# Patient Record
Sex: FEMALE | Race: WHITE | NOT HISPANIC OR LATINO | Employment: OTHER | URBAN - METROPOLITAN AREA
[De-identification: names, ages, dates, MRNs, and addresses within clinical notes are randomized per-mention and may not be internally consistent; named-entity substitution may affect disease eponyms.]

---

## 2024-02-09 ENCOUNTER — HOSPITAL ENCOUNTER (INPATIENT)
Facility: REHABILITATION | Age: 79
LOS: 14 days | DRG: 056 | End: 2024-02-23
Attending: PHYSICAL MEDICINE & REHABILITATION | Admitting: PHYSICAL MEDICINE & REHABILITATION
Payer: MEDICARE

## 2024-02-09 DIAGNOSIS — D68.51 FACTOR V LEIDEN (HCC): ICD-10-CM

## 2024-02-09 DIAGNOSIS — I63.512 ACUTE ISCHEMIC LEFT MCA STROKE (HCC): ICD-10-CM

## 2024-02-09 DIAGNOSIS — R41.82 ALTERED MENTAL STATUS, UNSPECIFIED ALTERED MENTAL STATUS TYPE: ICD-10-CM

## 2024-02-09 DIAGNOSIS — I10 PRIMARY HYPERTENSION: ICD-10-CM

## 2024-02-09 DIAGNOSIS — I25.10 CORONARY ARTERY DISEASE, UNSPECIFIED VESSEL OR LESION TYPE, UNSPECIFIED WHETHER ANGINA PRESENT, UNSPECIFIED WHETHER NATIVE OR TRANSPLANTED HEART: ICD-10-CM

## 2024-02-09 PROCEDURE — 770005 HCHG ROOM/CARE - REHAB PRIVATE (11*

## 2024-02-09 PROCEDURE — 700111 HCHG RX REV CODE 636 W/ 250 OVERRIDE (IP): Mod: JZ | Performed by: PHYSICAL MEDICINE & REHABILITATION

## 2024-02-09 PROCEDURE — 700102 HCHG RX REV CODE 250 W/ 637 OVERRIDE(OP): Performed by: PHYSICAL MEDICINE & REHABILITATION

## 2024-02-09 PROCEDURE — A9270 NON-COVERED ITEM OR SERVICE: HCPCS | Performed by: PHYSICAL MEDICINE & REHABILITATION

## 2024-02-09 PROCEDURE — 99223 1ST HOSP IP/OBS HIGH 75: CPT | Performed by: PHYSICAL MEDICINE & REHABILITATION

## 2024-02-09 PROCEDURE — 94760 N-INVAS EAR/PLS OXIMETRY 1: CPT

## 2024-02-09 RX ORDER — MECLIZINE HYDROCHLORIDE 25 MG/1
12.5 TABLET ORAL 2 TIMES DAILY
Status: DISCONTINUED | OUTPATIENT
Start: 2024-02-09 | End: 2024-02-14

## 2024-02-09 RX ORDER — LOSARTAN POTASSIUM 50 MG/1
100 TABLET ORAL
Status: DISCONTINUED | OUTPATIENT
Start: 2024-02-10 | End: 2024-02-14

## 2024-02-09 RX ORDER — CYCLOBENZAPRINE HCL 10 MG
10 TABLET ORAL 2 TIMES DAILY
Status: DISCONTINUED | OUTPATIENT
Start: 2024-02-09 | End: 2024-02-13

## 2024-02-09 RX ORDER — ATORVASTATIN CALCIUM 40 MG/1
40 TABLET, FILM COATED ORAL EVERY EVENING
Status: DISCONTINUED | OUTPATIENT
Start: 2024-02-09 | End: 2024-02-23 | Stop reason: HOSPADM

## 2024-02-09 RX ORDER — HYDROXYZINE HYDROCHLORIDE 25 MG/1
25 TABLET, FILM COATED ORAL 3 TIMES DAILY PRN
Status: DISCONTINUED | OUTPATIENT
Start: 2024-02-09 | End: 2024-02-14

## 2024-02-09 RX ORDER — ONDANSETRON 4 MG/1
4 TABLET, ORALLY DISINTEGRATING ORAL 4 TIMES DAILY PRN
Status: DISCONTINUED | OUTPATIENT
Start: 2024-02-09 | End: 2024-02-23 | Stop reason: HOSPADM

## 2024-02-09 RX ORDER — ONDANSETRON 2 MG/ML
4 INJECTION INTRAMUSCULAR; INTRAVENOUS 4 TIMES DAILY PRN
Status: DISCONTINUED | OUTPATIENT
Start: 2024-02-09 | End: 2024-02-23 | Stop reason: HOSPADM

## 2024-02-09 RX ORDER — AMOXICILLIN 250 MG
2 CAPSULE ORAL 2 TIMES DAILY
Status: DISCONTINUED | OUTPATIENT
Start: 2024-02-09 | End: 2024-02-22

## 2024-02-09 RX ORDER — TRAZODONE HYDROCHLORIDE 50 MG/1
50 TABLET ORAL
Status: DISCONTINUED | OUTPATIENT
Start: 2024-02-09 | End: 2024-02-14

## 2024-02-09 RX ORDER — POLYETHYLENE GLYCOL 3350 17 G/17G
1 POWDER, FOR SOLUTION ORAL
Status: DISCONTINUED | OUTPATIENT
Start: 2024-02-09 | End: 2024-02-22

## 2024-02-09 RX ORDER — PAROXETINE HYDROCHLORIDE 20 MG/1
40 TABLET, FILM COATED ORAL DAILY
Status: DISCONTINUED | OUTPATIENT
Start: 2024-02-10 | End: 2024-02-14

## 2024-02-09 RX ORDER — AMLODIPINE BESYLATE 5 MG/1
10 TABLET ORAL
Status: DISCONTINUED | OUTPATIENT
Start: 2024-02-10 | End: 2024-02-23 | Stop reason: HOSPADM

## 2024-02-09 RX ORDER — MECLIZINE HYDROCHLORIDE 25 MG/1
12.5 TABLET ORAL 3 TIMES DAILY PRN
Status: DISCONTINUED | OUTPATIENT
Start: 2024-02-09 | End: 2024-02-14

## 2024-02-09 RX ORDER — ASPIRIN 81 MG/1
81 TABLET, CHEWABLE ORAL DAILY
Status: DISCONTINUED | OUTPATIENT
Start: 2024-02-10 | End: 2024-02-23 | Stop reason: HOSPADM

## 2024-02-09 RX ORDER — ENOXAPARIN SODIUM 100 MG/ML
40 INJECTION SUBCUTANEOUS DAILY
Status: DISCONTINUED | OUTPATIENT
Start: 2024-02-09 | End: 2024-02-23 | Stop reason: HOSPADM

## 2024-02-09 RX ORDER — HYDRALAZINE HYDROCHLORIDE 25 MG/1
25 TABLET, FILM COATED ORAL EVERY 8 HOURS PRN
Status: DISCONTINUED | OUTPATIENT
Start: 2024-02-09 | End: 2024-02-23 | Stop reason: HOSPADM

## 2024-02-09 RX ORDER — GABAPENTIN 100 MG/1
100 CAPSULE ORAL 2 TIMES DAILY
Status: DISCONTINUED | OUTPATIENT
Start: 2024-02-09 | End: 2024-02-14

## 2024-02-09 RX ORDER — MONTELUKAST SODIUM 10 MG/1
10 TABLET ORAL NIGHTLY
Status: DISCONTINUED | OUTPATIENT
Start: 2024-02-09 | End: 2024-02-22

## 2024-02-09 RX ORDER — ECHINACEA PURPUREA EXTRACT 125 MG
2 TABLET ORAL PRN
Status: DISCONTINUED | OUTPATIENT
Start: 2024-02-09 | End: 2024-02-23 | Stop reason: HOSPADM

## 2024-02-09 RX ORDER — OMEPRAZOLE 20 MG/1
20 CAPSULE, DELAYED RELEASE ORAL DAILY
Status: DISCONTINUED | OUTPATIENT
Start: 2024-02-10 | End: 2024-02-21

## 2024-02-09 RX ORDER — CLOPIDOGREL BISULFATE 75 MG/1
75 TABLET ORAL DAILY
Status: DISCONTINUED | OUTPATIENT
Start: 2024-02-10 | End: 2024-02-23 | Stop reason: HOSPADM

## 2024-02-09 RX ADMIN — ENOXAPARIN SODIUM 40 MG: 100 INJECTION SUBCUTANEOUS at 18:31

## 2024-02-09 RX ADMIN — ATORVASTATIN CALCIUM 40 MG: 40 TABLET, FILM COATED ORAL at 21:11

## 2024-02-09 RX ADMIN — SENNOSIDES AND DOCUSATE SODIUM 2 TABLET: 50; 8.6 TABLET ORAL at 21:11

## 2024-02-09 RX ADMIN — GABAPENTIN 100 MG: 100 CAPSULE ORAL at 21:11

## 2024-02-09 RX ADMIN — CYCLOBENZAPRINE 10 MG: 10 TABLET, FILM COATED ORAL at 21:11

## 2024-02-09 RX ADMIN — MECLIZINE HYDROCHLORIDE 12.5 MG: 25 TABLET ORAL at 21:11

## 2024-02-09 RX ADMIN — METOPROLOL TARTRATE 100 MG: 25 TABLET, FILM COATED ORAL at 18:31

## 2024-02-09 RX ADMIN — MONTELUKAST 10 MG: 10 TABLET, FILM COATED ORAL at 21:10

## 2024-02-09 ASSESSMENT — LIFESTYLE VARIABLES
TOTAL SCORE: 0
ALCOHOL_USE: YES
ALCOHOL_USE: NO
HOW MANY TIMES IN THE PAST YEAR HAVE YOU HAD 5 OR MORE DRINKS IN A DAY: 0
ON A TYPICAL DAY WHEN YOU DRINK ALCOHOL HOW MANY DRINKS DO YOU HAVE: 1
CONSUMPTION TOTAL: NEGATIVE
HOW MANY TIMES IN THE PAST YEAR HAVE YOU HAD 5 OR MORE DRINKS IN A DAY: 0
HAVE PEOPLE ANNOYED YOU BY CRITICIZING YOUR DRINKING: NO
ON A TYPICAL DAY WHEN YOU DRINK ALCOHOL HOW MANY DRINKS DO YOU HAVE: 0
AVERAGE NUMBER OF DAYS PER WEEK YOU HAVE A DRINK CONTAINING ALCOHOL: 1
HAVE YOU EVER FELT YOU SHOULD CUT DOWN ON YOUR DRINKING: NO
EVER HAD A DRINK FIRST THING IN THE MORNING TO STEADY YOUR NERVES TO GET RID OF A HANGOVER: NO
EVER HAD A DRINK FIRST THING IN THE MORNING TO STEADY YOUR NERVES TO GET RID OF A HANGOVER: NO
TOTAL SCORE: 0
AVERAGE NUMBER OF DAYS PER WEEK YOU HAVE A DRINK CONTAINING ALCOHOL: 0
EVER_SMOKED: YES
TOTAL SCORE: 0
TOTAL SCORE: 0
HAVE PEOPLE ANNOYED YOU BY CRITICIZING YOUR DRINKING: NO
CONSUMPTION TOTAL: NEGATIVE
HAVE YOU EVER FELT YOU SHOULD CUT DOWN ON YOUR DRINKING: NO
EVER FELT BAD OR GUILTY ABOUT YOUR DRINKING: NO
TOTAL SCORE: 0
EVER FELT BAD OR GUILTY ABOUT YOUR DRINKING: NO
TOTAL SCORE: 0

## 2024-02-09 ASSESSMENT — PATIENT HEALTH QUESTIONNAIRE - PHQ9
SUM OF ALL RESPONSES TO PHQ9 QUESTIONS 1 AND 2: 0
2. FEELING DOWN, DEPRESSED, IRRITABLE, OR HOPELESS: NOT AT ALL
1. LITTLE INTEREST OR PLEASURE IN DOING THINGS: NOT AT ALL
1. LITTLE INTEREST OR PLEASURE IN DOING THINGS: NOT AT ALL
SUM OF ALL RESPONSES TO PHQ9 QUESTIONS 1 AND 2: 0
2. FEELING DOWN, DEPRESSED, IRRITABLE, OR HOPELESS: NOT AT ALL

## 2024-02-09 ASSESSMENT — PAIN DESCRIPTION - PAIN TYPE: TYPE: ACUTE PAIN

## 2024-02-09 NOTE — PROGRESS NOTES
1506 Pt arrived at [facility] from [facility] via transport. Dr. Powers to follow. Initial assessment initiated. Pt oriented to room and facility routine and safety measures; pt education binder provided and discussed. Pt A/O x 4, incontinent/continent of bowel and bladder. Min assist for transfers. All wounds photographed and documented; photos uploaded to Media Manager. Pt denies pain or discomfort at this time. Pt positioned for comfort in bed. Call light within reach, safety measures in place. Will continue to monitor.

## 2024-02-09 NOTE — PROGRESS NOTES
4 Eyes Skin Assessment Completed by LAYO Avery and LAYO Givens.    Head WDL  Ears WDL  Nose WDL  Mouth WDL  Neck WDL  Breast/Chest WDL  Shoulder Blades WDL  Spine WDL  (R) Arm/Elbow/Hand WDL  (L) Arm/Elbow/Hand WDL  Abdomen WDL  Groin WDL  Scrotum/Coccyx/Buttocks Redness  (R) Leg WDL  (L) Leg WDL  (R) Heel/Foot/Toe Redness  (L) Heel/Foot/Toe Redness          Devices In Places Nasal Canula      Interventions In Place Waffle Overlay    Possible Skin Injury No    Pictures Uploaded Into Epic Yes  Wound Consult Placed Yes  RN Wound Prevention Protocol Ordered Yes

## 2024-02-10 ENCOUNTER — APPOINTMENT (OUTPATIENT)
Dept: SPEECH THERAPY | Facility: REHABILITATION | Age: 79
DRG: 056 | End: 2024-02-10
Attending: PHYSICAL MEDICINE & REHABILITATION
Payer: MEDICARE

## 2024-02-10 ENCOUNTER — APPOINTMENT (OUTPATIENT)
Dept: OCCUPATIONAL THERAPY | Facility: REHABILITATION | Age: 79
DRG: 056 | End: 2024-02-10
Attending: PHYSICAL MEDICINE & REHABILITATION
Payer: MEDICARE

## 2024-02-10 ENCOUNTER — APPOINTMENT (OUTPATIENT)
Dept: PHYSICAL THERAPY | Facility: REHABILITATION | Age: 79
DRG: 056 | End: 2024-02-10
Attending: PHYSICAL MEDICINE & REHABILITATION
Payer: MEDICARE

## 2024-02-10 ENCOUNTER — ANCILLARY PROCEDURE (OUTPATIENT)
Dept: CARDIOLOGY | Facility: REHABILITATION | Age: 79
DRG: 056 | End: 2024-02-10
Attending: PHYSICAL MEDICINE & REHABILITATION
Payer: MEDICARE

## 2024-02-10 LAB
ALBUMIN SERPL BCP-MCNC: 3.1 G/DL (ref 3.2–4.9)
ALBUMIN/GLOB SERPL: 1 G/DL
ALP SERPL-CCNC: 144 U/L (ref 30–99)
ALT SERPL-CCNC: 11 U/L (ref 2–50)
ANION GAP SERPL CALC-SCNC: 9 MMOL/L (ref 7–16)
AST SERPL-CCNC: 10 U/L (ref 12–45)
BASOPHILS # BLD AUTO: 0.7 % (ref 0–1.8)
BASOPHILS # BLD: 0.05 K/UL (ref 0–0.12)
BILIRUB SERPL-MCNC: 0.2 MG/DL (ref 0.1–1.5)
BUN SERPL-MCNC: 8 MG/DL (ref 8–22)
CALCIUM ALBUM COR SERPL-MCNC: 8.8 MG/DL (ref 8.5–10.5)
CALCIUM SERPL-MCNC: 8.1 MG/DL (ref 8.5–10.5)
CHLORIDE SERPL-SCNC: 106 MMOL/L (ref 96–112)
CO2 SERPL-SCNC: 26 MMOL/L (ref 20–33)
CREAT SERPL-MCNC: 0.51 MG/DL (ref 0.5–1.4)
EOSINOPHIL # BLD AUTO: 0.42 K/UL (ref 0–0.51)
EOSINOPHIL NFR BLD: 6.1 % (ref 0–6.9)
ERYTHROCYTE [DISTWIDTH] IN BLOOD BY AUTOMATED COUNT: 41.9 FL (ref 35.9–50)
EST. AVERAGE GLUCOSE BLD GHB EST-MCNC: 120 MG/DL
GFR SERPLBLD CREATININE-BSD FMLA CKD-EPI: 96 ML/MIN/1.73 M 2
GLOBULIN SER CALC-MCNC: 3 G/DL (ref 1.9–3.5)
GLUCOSE SERPL-MCNC: 97 MG/DL (ref 65–99)
HBA1C MFR BLD: 5.8 % (ref 4–5.6)
HCT VFR BLD AUTO: 33 % (ref 37–47)
HGB BLD-MCNC: 10.7 G/DL (ref 12–16)
IMM GRANULOCYTES # BLD AUTO: 0.02 K/UL (ref 0–0.11)
IMM GRANULOCYTES NFR BLD AUTO: 0.3 % (ref 0–0.9)
LYMPHOCYTES # BLD AUTO: 1.57 K/UL (ref 1–4.8)
LYMPHOCYTES NFR BLD: 23 % (ref 22–41)
MAGNESIUM SERPL-MCNC: 2 MG/DL (ref 1.5–2.5)
MCH RBC QN AUTO: 30.9 PG (ref 27–33)
MCHC RBC AUTO-ENTMCNC: 32.4 G/DL (ref 32.2–35.5)
MCV RBC AUTO: 95.4 FL (ref 81.4–97.8)
MONOCYTES # BLD AUTO: 0.38 K/UL (ref 0–0.85)
MONOCYTES NFR BLD AUTO: 5.6 % (ref 0–13.4)
NEUTROPHILS # BLD AUTO: 4.39 K/UL (ref 1.82–7.42)
NEUTROPHILS NFR BLD: 64.3 % (ref 44–72)
NRBC # BLD AUTO: 0 K/UL
NRBC BLD-RTO: 0 /100 WBC (ref 0–0.2)
PLATELET # BLD AUTO: 303 K/UL (ref 164–446)
PMV BLD AUTO: 9.6 FL (ref 9–12.9)
POTASSIUM SERPL-SCNC: 4.1 MMOL/L (ref 3.6–5.5)
PROT SERPL-MCNC: 6.1 G/DL (ref 6–8.2)
RBC # BLD AUTO: 3.46 M/UL (ref 4.2–5.4)
SODIUM SERPL-SCNC: 141 MMOL/L (ref 135–145)
TSH SERPL DL<=0.005 MIU/L-ACNC: 1.62 UIU/ML (ref 0.38–5.33)
WBC # BLD AUTO: 6.8 K/UL (ref 4.8–10.8)

## 2024-02-10 PROCEDURE — 97165 OT EVAL LOW COMPLEX 30 MIN: CPT

## 2024-02-10 PROCEDURE — 85025 COMPLETE CBC W/AUTO DIFF WBC: CPT

## 2024-02-10 PROCEDURE — 770005 HCHG ROOM/CARE - REHAB PRIVATE (11*

## 2024-02-10 PROCEDURE — 83735 ASSAY OF MAGNESIUM: CPT

## 2024-02-10 PROCEDURE — 92523 SPEECH SOUND LANG COMPREHEN: CPT

## 2024-02-10 PROCEDURE — 97530 THERAPEUTIC ACTIVITIES: CPT

## 2024-02-10 PROCEDURE — 83036 HEMOGLOBIN GLYCOSYLATED A1C: CPT

## 2024-02-10 PROCEDURE — 84443 ASSAY THYROID STIM HORMONE: CPT

## 2024-02-10 PROCEDURE — 97535 SELF CARE MNGMENT TRAINING: CPT

## 2024-02-10 PROCEDURE — 80053 COMPREHEN METABOLIC PANEL: CPT

## 2024-02-10 PROCEDURE — 700111 HCHG RX REV CODE 636 W/ 250 OVERRIDE (IP): Mod: JZ | Performed by: PHYSICAL MEDICINE & REHABILITATION

## 2024-02-10 PROCEDURE — 99232 SBSQ HOSP IP/OBS MODERATE 35: CPT | Performed by: PHYSICAL MEDICINE & REHABILITATION

## 2024-02-10 PROCEDURE — 92610 EVALUATE SWALLOWING FUNCTION: CPT

## 2024-02-10 PROCEDURE — 36415 COLL VENOUS BLD VENIPUNCTURE: CPT

## 2024-02-10 PROCEDURE — 700102 HCHG RX REV CODE 250 W/ 637 OVERRIDE(OP): Performed by: PHYSICAL MEDICINE & REHABILITATION

## 2024-02-10 PROCEDURE — 97162 PT EVAL MOD COMPLEX 30 MIN: CPT

## 2024-02-10 PROCEDURE — 94760 N-INVAS EAR/PLS OXIMETRY 1: CPT

## 2024-02-10 PROCEDURE — A9270 NON-COVERED ITEM OR SERVICE: HCPCS | Performed by: PHYSICAL MEDICINE & REHABILITATION

## 2024-02-10 RX ADMIN — OMEPRAZOLE 20 MG: 20 CAPSULE, DELAYED RELEASE ORAL at 08:52

## 2024-02-10 RX ADMIN — ATORVASTATIN CALCIUM 40 MG: 40 TABLET, FILM COATED ORAL at 19:53

## 2024-02-10 RX ADMIN — AMLODIPINE BESYLATE 10 MG: 5 TABLET ORAL at 05:09

## 2024-02-10 RX ADMIN — GABAPENTIN 100 MG: 100 CAPSULE ORAL at 08:56

## 2024-02-10 RX ADMIN — CYCLOBENZAPRINE 10 MG: 10 TABLET, FILM COATED ORAL at 08:56

## 2024-02-10 RX ADMIN — ASPIRIN 81 MG 81 MG: 81 TABLET ORAL at 08:52

## 2024-02-10 RX ADMIN — GABAPENTIN 100 MG: 100 CAPSULE ORAL at 19:53

## 2024-02-10 RX ADMIN — LOSARTAN POTASSIUM 100 MG: 50 TABLET, FILM COATED ORAL at 05:09

## 2024-02-10 RX ADMIN — METOPROLOL TARTRATE 100 MG: 25 TABLET, FILM COATED ORAL at 17:22

## 2024-02-10 RX ADMIN — METOPROLOL TARTRATE 100 MG: 25 TABLET, FILM COATED ORAL at 05:08

## 2024-02-10 RX ADMIN — CYCLOBENZAPRINE 10 MG: 10 TABLET, FILM COATED ORAL at 19:53

## 2024-02-10 RX ADMIN — MECLIZINE HYDROCHLORIDE 12.5 MG: 25 TABLET ORAL at 19:53

## 2024-02-10 RX ADMIN — PAROXETINE HYDROCHLORIDE 40 MG: 20 TABLET, FILM COATED ORAL at 08:55

## 2024-02-10 RX ADMIN — ENOXAPARIN SODIUM 40 MG: 100 INJECTION SUBCUTANEOUS at 17:25

## 2024-02-10 RX ADMIN — SENNOSIDES AND DOCUSATE SODIUM 2 TABLET: 50; 8.6 TABLET ORAL at 08:52

## 2024-02-10 RX ADMIN — MECLIZINE HYDROCHLORIDE 12.5 MG: 25 TABLET ORAL at 08:55

## 2024-02-10 RX ADMIN — MONTELUKAST 10 MG: 10 TABLET, FILM COATED ORAL at 19:56

## 2024-02-10 RX ADMIN — CLOPIDOGREL BISULFATE 75 MG: 75 TABLET ORAL at 08:53

## 2024-02-10 ASSESSMENT — BRIEF INTERVIEW FOR MENTAL STATUS (BIMS)
ASKED TO RECALL BLUE: YES, NO CUE REQUIRED
BIMS SUMMARY SCORE: 12
INITIAL REPETITION OF BED BLUE SOCK - FIRST ATTEMPT: 3
WHAT DAY OF THE WEEK IS IT: CORRECT
WHAT YEAR IS IT: CORRECT
ASKED TO RECALL SOCK: NO, COULD NOT RECALL
ASKED TO RECALL BED: YES, NO CUE REQUIRED
WHAT DAY OF THE WEEK IS IT: INCORRECT
ASKED TO RECALL BLUE: YES, AFTER CUEING (A COLOR")"
WHAT YEAR IS IT: CORRECT
ASKED TO RECALL SOCK: YES, AFTER CUEING (SOMETHING TO WEAR")"
WHAT MONTH IS IT: ACCURATE WITHIN 5 DAYS
INITIAL REPETITION OF BED BLUE SOCK - FIRST ATTEMPT: 3
WHAT MONTH IS IT: ACCURATE WITHIN 5 DAYS
BIMS SUMMARY SCORE: 13
ASKED TO RECALL BED: YES, NO CUE REQUIRED

## 2024-02-10 ASSESSMENT — ACTIVITIES OF DAILY LIVING (ADL)
TUB_SHOWER_TRANSFER_DESCRIPTION: GRAB BAR;SHOWER BENCH;INCREASED TIME;INITIAL PREPARATION FOR TASK;SET-UP OF EQUIPMENT;SUPERVISION FOR SAFETY;VERBAL CUEING
TOILETING_LEVEL_OF_ASSIST_DESCRIPTION: ASSIST TO PULL PANTS UP;ASSIST TO PULL PANTS DOWN;ASSIST FOR STANDING BALANCE;GRAB BAR;INCREASED TIME;INITIAL PREPARATION FOR TASK;SET-UP OF EQUIPMENT;SUPERVISION FOR SAFETY;VERBAL CUEING
BED_CHAIR_WHEELCHAIR_TRANSFER_DESCRIPTION: INCREASED TIME;INITIAL PREPARATION FOR TASK;SET-UP OF EQUIPMENT;SUPERVISION FOR SAFETY;VERBAL CUEING
TOILETING: INDEPENDENT
BED_CHAIR_WHEELCHAIR_TRANSFER_DESCRIPTION: INCREASED TIME;INITIAL PREPARATION FOR TASK;SET-UP OF EQUIPMENT;SQUAT PIVOT TRANSFER TO WHEELCHAIR;SUPERVISION FOR SAFETY;VERBAL CUEING

## 2024-02-10 ASSESSMENT — GAIT ASSESSMENTS
ASSISTIVE DEVICE: FRONT WHEEL WALKER
GAIT LEVEL OF ASSIST: MINIMAL ASSIST
DISTANCE (FEET): 50

## 2024-02-10 NOTE — THERAPY
"Speech Language Pathology   Initial Assessment     Patient Name: Lorraine Leung  AGE:  78 y.o., SEX:  female  Medical Record #: [id number]  Today's Date: 2/10/2024     Subjective    Pt pleasant and cooperative during speech language evaluation and oral pharyngeal evaluation.   Per chart review:  \"77 y/o with factor V, CAD, vertigo, anxiety, hypoxia on 3l baseline found down on 1/6. MRI showed large Left MCA stroke. No TPA or thrombectomy was administered. Patient went to SNF and is now admitted to rehabilitaiton.     On exam she complains of intermittent anxiety and vertigo.\"       Objective       02/10/24 0801   Evaluation Charges   Charges Yes   SLP Speech Language Evaluation Speech Sound Language Comprehension   SLP Oral Pharyngeal Evaluation Oral Pharyngeal Evaluation   SLP Total Time Spent   SLP Individual Total Time Spent (Mins) 90   Precautions   Precautions Fall Risk   Comments [place]  (Pt has personal amplifier in room.)   Prior Living Situation   Prior Services Continuous (24 Hour) Care Giving Per Service  (Transferred from SNF, previously IND)   Housing / Facility 1 Story House   Lives with - Patient's Self Care Capacity Alone and Able to Care For Self   Prior Level Of Function   Communication Within Functional Limits   Swallow Within Functional Limits   Dentition Intact   Dentures None   Hearing Impaired Both Ears  (Pt was in process to get aids, has personal amplifier in room)   Vision Reading    Patient's Primary Language English   Education Completed College   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   Cognition   Cognitive-Linguistic (WDL) X   Orientation  Minimal (4)   Verbal Short Term Memory 5 Minutes   ABS (Agitated Behavior Scale)   Agitated Behavior Scale Performed No   Cognitive Pattern Assessment   Cognitive Pattern Assessment Used BIMS   Brief Interview for Mental Status (BIMS)   Repetition of Three Words (First Attempt) 3   Temporal Orientation: Year Correct " "  Temporal Orientation: Month Accurate within 5 days   Temporal Orientation: Day Correct   Recall: \"Sock\" Yes, after cueing (\"something to wear\")   Recall: \"Blue\" Yes, after cueing (\"a color\")   Recall: \"Bed\" Yes, no cue required   BIMS Summary Score 13   Confusion Assessment Method (CAM)   Is there evidence of an acute change in mental status from the patient's baseline? No   Inattention Behavior not present   Disorganized thinking Behavior not present   Altered level of consciousness Behavior not present   Social / Pragmatic Communication   Social / Pragmatic Communication WDL   Tracheostomy   Tracheostomy No   Speech Mechanisms / Voice Production   Speech Mechanisms / Voice Production (WDL) WDL   Labial Function   Labial Function (WDL) WDL   Lingual Function   Lingual Function (WDL) WDL   Jaw Function   Jaw Function (WDL) WDL   Velar Function   Velar Function (WDL) WDL   Laryngeal Function   Laryngeal Function (WDL) WDL   Swallowing   Swallowing (WDL) X   Thin Liquid Within Functional Limits   Single Swallow Thin / Nectar (Cup) Within Functional Limits   Serial Swallows Thin / [place] (Straw) Within Functional Limits   Masticated Foods Within Functional Limits   Dysphagia Strategies / Recommendations   Strategies / Interventions Recommended (Yes / No) Yes   Compensatory Strategies Single Sips / Bites;Alternate Solids / Liquids   Diet / Liquid Recommendation Soft & Bite-Sized (6) - (Dysphagia III);Thin (0)   Medication Administration  Whole with Liquid Wash  (one at a time)   Swallowing/Nutritional Status   Swallowing/Nutritional Status Modified food consistency   Functional Level of Assist   Eating Modified Independent   Eating Description Increased time;Modified diet   Comprehension Modified Independent   Comprehension Description Hearing aids/amplifiers;Glasses   Expression Modified Independent   Expression Description Verbal cueing   Social Interaction Modified Independent   Social Interaction Description " Increased time;Schedule   Problem Solving Minimal Assist   Problem Solving Description Bed/chair alarm;Increased time;Seat belt;Therapy schedule;Verbal cueing   Memory Moderate Assist   Memory Description Bed/chair alarm;Increased time;Seat belt;Therapy schedule;Verbal cueing   Outcome Measures   Outcome Measures Utilized MASA   MASA (Gonzales Assessment of Swallowing Ability)   Alertness 10   Cooperation 10   Auditory Comprehension 8   Respiration 10   Respiratory Rate for Swallow 5   Dysphasia 5   Dyspraxia 5   Dysarthria 5   Saliva 5   Lip Seal 5   Tongue Movement 10   Tongue Strength 10   Tongue Coordination 10   Oral Preparation 10   Gag 1   Palate 10   Bolus Clearance 10   Oral Transit 8   Cough Reflex 5   Voluntary Cough 10   Voice 10   Trache 10   Pharygneal Phase 10   Pharyngeal Response 10   Diet Recommendations Mechanical soft with chopped meat   Fluid Recommendations Regular   Swallow Integrity Unlikely dysphagia/aspiration   Total Score 192   Dysphagia Severity No abnormality detected   Aspiration Severity No abnormality detected   Problem List   Problem List Cognitive-Language Deficits;Cognitive-Linguistic Deficits;Other (Comments)  (Unlikely dysphagia, pt has significant hx of reflux with cough during moments of acid reflux. Pt was planning to have an esophageal swallow study premorbid.)   Current Discharge Plan   Current Discharge Plan Return to Prior Living Situation   Benefit   Therapy Benefit Patient would benefit from Inpatient Rehab Speech-Language Pathology to address above identified deficits.   Interdisciplinary Plan of Care Collaboration   IDT Collaboration with  Occupational Therapist;Nursing;Therapy Tech   Patient Position at End of Therapy Seated;Chair Alarm On;Call Light within Reach;Tray Table within Reach;Self Releasing Lap Belt Applied   Collaboration Comments Medication administration, POC   Strengths & Barriers   Strengths Able to follow instructions;Effective communication  skills;Independent prior level of function;Good insight into deficits/needs;Willingly participates in therapeutic activities;Pleasant and cooperative   Barriers Impaired functional cognition;Hearing impairment   Speech Language Pathologist Assigned   Assigned SLP / Treatment Time / Comments TBD/ 60 cog, sw       Assessment    Patient is 78 y.o. female with a diagnosis of Acute ischemic L MCA stroke.  Additional factors influencing patient status/progress (ie: cognitive factors, co-morbidities, social support, etc): hearing impairment, significant hx of esophageal acid reflux, cognitive linguistic deficits, supportive family, independent PLOF.      Oral Pharyngeal Evaluation: Oral mech exam unremarkable. Pt reports significant hx of acid reflux and was planning to have an esophageal swallow study and that she takes prilosec and one other acid reflux medication premorbidly. Pt did present with acid reflux type cough ~ 30 minutes following meal. Pt tolerated level 6 soft and bite size textures and level 0 thin liquids with no overt clinical s/sx of aspiration.  Plan: Recommend pt trial regular textures for 2/2 meals and D/C swallow if no additional dsyphagia concerns arise.    Speech Language Evaluation:  The Scales for Cognitive and Communicative Ability for Neurorehabilitation was initiated and not completed d/t time constraints. Current results as follows:  Orientation - 83%  Memory - 37%     Plan  Recommend Speech Therapy 30-60 minutes per day 5-6 days per week for 2 weeks for the following treatments:  SLP Swallowing Dysfunction Treatment, SLP Self Care / ADL Training , SLP Cognitive Skill Development, and SLP Group Treatment.    Passport items to be completed:  Express basic needs, understand food/liquid recommendations, consistently follow swallow precautions, manage finances, manage medications, arrive to therapy appointments on time, complete daily memory log entries, solve problems related to safety  situations, review education related to hospitalization, complete caregiver training     Goals:  Long term and short term goals have been discussed with patient and they are in agreement.    Speech Therapy Problems (Active)       Problem: Memory STGs       Dates: Start:  02/10/24         Goal: STG-Within one week, patient will       Dates: Start:  02/10/24       Description: 1) Individualized goal:  recall novel information related to rehabilitation using external and internal memory strategies with 80% acc and MIN A.   2) Interventions:  SLP Self Care / ADL Training , SLP Cognitive Skill Development, and SLP Group Treatment                Problem: Problem Solving STGs       Dates: Start:  02/10/24         Goal: STG-Within one week, patient will       Dates: Start:  02/10/24       Description: 1) Individualized goal:  complete the SCCAN with additional goals developed as appropriate.   2) Interventions:  SLP Self Care / ADL Training , SLP Cognitive Skill Development, and SLP Group Treatment                Problem: Speech/Swallowing LTGs       Dates: Start:  02/10/24         Goal: LTG-By discharge, patient will safely swallow       Dates: Start:  02/10/24       Description: 1) Individualized goal:  level 7 regular textures and thin liquids with no overt clinical s/sx of aspiration for a safe D/C to PLOF.  2) Interventions:  SLP Swallowing Dysfunction Treatment, SLP Self Care / ADL Training , and SLP Group Treatment             Goal: LTG-By discharge, patient will solve complex problem       Dates: Start:  02/10/24       Description: 1) Individualized goal:  related to health/safety with 80% acc and MOD I for a safe D/C to PLOF.   2) Interventions:  SLP Self Care / ADL Training , SLP Cognitive Skill Development, and SLP Group Treatment                Problem: Swallowing STGs       Dates: Start:  02/10/24         Goal: STG-Within one week, patient will safely swallow       Dates: Start:  02/10/24       Description: 1)  Individualized goal:  trials of level 7 regular textures and thin liquids with no overt clinical s/sx of aspiration for 2/2 meals w/ SLP.  2) Interventions:  SLP Swallowing Dysfunction Treatment, SLP Self Care / ADL Training , and SLP Group Treatment

## 2024-02-10 NOTE — PROGRESS NOTES
Physical Medicine & Rehabilitation Progress Note    Encounter Date: 2/10/2024    Chief Complaint: Left MCA stroke     Interval Events (Subjective):  Doing well today. Admit labs reviewed, no new concerns. Awaiting BL LE US results. Currently on 3.5L O2.     Objective:  VITAL SIGNS: /73   Pulse 68   Temp 37 °C (98.6 °F) (Oral)   Resp 16   Wt 64.4 kg (141 lb 14.4 oz)   SpO2 97%   Gen: NAD  Head: NC/AT  Eyes/ Nose/ Mouth: PERRLA, moist mucous membranes  Cardio: RRR, good distal perfusion, warm extremities  Pulm: normal respiratory effort, no cyanosis   Abd: Soft NTND, negative borborygmi   Ext: No peripheral edema. No calf tenderness. No clubbing.      Laboratory Values:  Recent Results (from the past 72 hour(s))   CBC with Differential    Collection Time: 02/10/24  5:39 AM   Result Value Ref Range    WBC 6.8 4.8 - 10.8 K/uL    RBC 3.46 (L) 4.20 - 5.40 M/uL    Hemoglobin 10.7 (L) 12.0 - 16.0 g/dL    Hematocrit 33.0 (L) 37.0 - 47.0 %    MCV 95.4 81.4 - 97.8 fL    MCH 30.9 27.0 - 33.0 pg    MCHC 32.4 32.2 - 35.5 g/dL    RDW 41.9 35.9 - 50.0 fL    Platelet Count 303 164 - 446 K/uL    MPV 9.6 9.0 - 12.9 fL    Neutrophils-Polys 64.30 44.00 - 72.00 %    Lymphocytes 23.00 22.00 - 41.00 %    Monocytes 5.60 0.00 - 13.40 %    Eosinophils 6.10 0.00 - 6.90 %    Basophils 0.70 0.00 - 1.80 %    Immature Granulocytes 0.30 0.00 - 0.90 %    Nucleated RBC 0.00 0.00 - 0.20 /100 WBC    Neutrophils (Absolute) 4.39 1.82 - 7.42 K/uL    Lymphs (Absolute) 1.57 1.00 - 4.80 K/uL    Monos (Absolute) 0.38 0.00 - 0.85 K/uL    Eos (Absolute) 0.42 0.00 - 0.51 K/uL    Baso (Absolute) 0.05 0.00 - 0.12 K/uL    Immature Granulocytes (abs) 0.02 0.00 - 0.11 K/uL    NRBC (Absolute) 0.00 K/uL   Comp Metabolic Panel (CMP)    Collection Time: 02/10/24  5:39 AM   Result Value Ref Range    Sodium 141 135 - 145 mmol/L    Potassium 4.1 3.6 - 5.5 mmol/L    Chloride 106 96 - 112 mmol/L    Co2 26 20 - 33 mmol/L    Anion Gap 9.0 7.0 - 16.0    Glucose 97  65 - 99 mg/dL    Bun 8 8 - 22 mg/dL    Creatinine 0.51 0.50 - 1.40 mg/dL    Calcium 8.1 (L) 8.5 - 10.5 mg/dL    Correct Calcium 8.8 8.5 - 10.5 mg/dL    AST(SGOT) 10 (L) 12 - 45 U/L    ALT(SGPT) 11 2 - 50 U/L    Alkaline Phosphatase 144 (H) 30 - 99 U/L    Total Bilirubin 0.2 0.1 - 1.5 mg/dL    Albumin 3.1 (L) 3.2 - 4.9 g/dL    Total Protein 6.1 6.0 - 8.2 g/dL    Globulin 3.0 1.9 - 3.5 g/dL    A-G Ratio 1.0 g/dL   HEMOGLOBIN A1C    Collection Time: 02/10/24  5:39 AM   Result Value Ref Range    Glycohemoglobin 5.8 (H) 4.0 - 5.6 %    Est Avg Glucose 120 mg/dL   Magnesium    Collection Time: 02/10/24  5:39 AM   Result Value Ref Range    Magnesium 2.0 1.5 - 2.5 mg/dL   TSH with Reflex to FT4    Collection Time: 02/10/24  5:39 AM   Result Value Ref Range    TSH 1.620 0.380 - 5.330 uIU/mL   ESTIMATED GFR    Collection Time: 02/10/24  5:39 AM   Result Value Ref Range    GFR (CKD-EPI) 96 >60 mL/min/1.73 m 2       Medications:  Scheduled Medications   Medication Dose Frequency    amLODIPine  10 mg Q DAY    aspirin  81 mg DAILY    atorvastatin  40 mg Q EVENING    clopidogrel  75 mg DAILY    cyclobenzaprine  10 mg BID    gabapentin  100 mg BID    losartan  100 mg Q DAY    metoprolol tartrate  100 mg BID    montelukast  10 mg Nightly    PARoxetine  40 mg DAILY    Pharmacy Consult Request  1 Each PHARMACY TO DOSE    senna-docusate  2 Tablet BID    omeprazole  20 mg DAILY    meclizine  12.5 mg BID    enoxaparin (LOVENOX) injection  40 mg DAILY AT 1800     PRN medications: Respiratory Therapy Consult, hydrALAZINE, senna-docusate **AND** polyethylene glycol/lytes, ondansetron **OR** ondansetron, traZODone, sodium chloride, meclizine, hydrOXYzine HCl    Diet:  Current Diet Order   Procedures    Diet Order Diet: Level 6 - Soft and Bite Sized; Liquid level: Level 0 - Thin       Assessment:  Active Hospital Problems    Diagnosis     *Acute ischemic left MCA stroke (HCC)        Medical Decision Making and Plan:  CVA:  - Patient with L MCA  stroke on 1/6 e. Etiology likely Unclear . Received NO intervention  - Continue ASA and statin for secondary stroke prophylaxis.  -Zio patch     Factor V leiden  -has not been on anticoagulation for 10 years  -bilateral  US to rule out blood clots - awaiting study      Vertigo  -PT to test BPPV  -Meclizine 12.5mg BID     CAD  -Pavix     COPD  -on 3l at home, currently on 3.5L  -Montelucast     HTN  -Norvasc 10mg daily  Losartan 100mg daily  Lopressor 100mg BID     Lumbar back pain  Flexeril and gabapentin     Cognitive Communicative deficits:  - Patient with significant cognitive deficits due to stroke  - Environmental modifications to decrease excessive stimulation including turning off the lights  - Consider starting neurostimulants such Nuvigil, amantadine or methylphenidate  - SLP to evaluate and treat cognitive deficits.   -Neuropsych will follow and perform testing if/when appropriate.      Hemiparesis:  - Patient with Rdom hemiparesis  - consider starting SSRI per FLAME trial to facilitate post-stroke motor recovery  - Continue aggressive therapies with PT and OT to strengthen and optimize function.     Spasticity:  - Patient at risk ofincreased tone in the affected limbs/muscles  - PT and OT to initiate aggressive stretching, ROM exercises, bracing, splinting, casting and positioning as appropriate.  - If the tone fails to improve with the above conservative measures, consider further intervention with botulinum toxin or phenol injections.     Dysphagia/Nutrition:  - Patient currently on Regular DIET.  - Continue therapies with SLP. SLP to perform swallow evaluation and provide oral motor exercises if necessary.      Neurogenic bladder:  - Timed voids with PVR q4H x3. If PVR > 400mL or if patient is unable to void, straight cath patient.     Neurogenic bowel:  -  Colace, Senna BID on admission  - Goal of 1BM/day.     Circadian Rhythm disorder:   Recommend lights on during the day/off at night, minimize  nighttime interruptions as able.     Mood  Anxiety  -Paxil  -atarax PRN     - at risk of adjustment disorder, depression, and anxiety due to functional decline     ID:  - at risk for Urinary tract infection     Skin/Wounds:  - Pressure relief q2h while in bed. Close monitoring for signs of breakdown     Pain:  - Neuroceptic - On Tylenol prn     DVT prophylaxis:  Patient is on Lovenox.      GI prophylaxis:  On Prilosec 20mg daily       -Follow-up Stroke bridge, heme        Goals/Expected Level of Function Based on Current Medical and Functional Status:  (may change based on patient's medical status and rate of impairment recovery)  Transfers:   Supervision  Mobility/Gait:   Supervision  ADL's:   Supervision  Cognition:  min cues  Swallowing:  least restrictive diet     DISPOSITION: Discharge to pre-morbid independent living setting with the supportive care of patient's family.    ____________________________________    Deandre Sahni DO MS  ABP[facility] - Physical Medicine & Rehabilitation   ____________________________________    Total time:  >35 minutes.  I spent greater than 50% of the time for patient care and coordination on this date, including unit/floor time, and face-to-face time with the patient as per assessment and plan above.

## 2024-02-10 NOTE — THERAPY
Occupational Therapy   Initial Evaluation     Patient Name: Lorraine Leung  Age:  78 y.o., Sex:  female  Medical Record #: [id number]  Today's Date: 2/10/2024     Subjective    Pt was alert, oriented and cooperative w/ tx.     Objective       02/10/24 0701   Prior Living Situation   Prior Services Continuous (24 Hour) Care Giving Per Service  (Transferred from SNF)   Housing / Facility 1 Story House   Steps Into Home 2   Steps In Home 0   Rail None   Bathroom Set up Bathtub / Shower Combination;Shower Glass Doors;Shower Chair   Equipment Owned 4-Wheel Walker;Single Point Cane;Tub / Shower Seat;Oxygen  (Baseline 3.5L supplmental O2)   Lives with - Patient's Self Care Capacity Alone and Able to Care For Self   Comments Pt resides alone in a [facility] in [place], [place].  Daughter and sister live in the area and are available to assist as needed.  PLOF Mod I to Indep for ADL's, transfers and ambulation w/out a device at home and use of SPC in community.   Prior Level of ADL Function   Self Feeding Independent   Grooming / Hygiene Independent   Bathing Independent   Dressing Independent   Toileting Independent   Prior Level of IADL Function   Medication Management Independent   Laundry Independent   Kitchen Mobility Independent   Finances Independent   Home Management Independent   Shopping Independent   Prior Level Of Mobility Independent With Device in Community;Independent With Steps in Community;Independent Without Device in Home;Independent With Steps in Home  (No device in home.  SPC in community.  Daughter had just purchased a 4WW - Pt reported she had not used it yet.)   Driving / Transportation Driving Independent   Occupation (Pre-Hospital Vocational) Retired Due To Age   Prior Functioning: Everyday Activities   Self Care Independent   Indoor Mobility (Ambulation) Independent   Stairs Independent   Functional Cognition Independent   Prior Device Use None of the given options  (SPC in the community)   Vitals   Pulse 78  "  Patient BP Position Sitting   Respiration 17   Pulse Oximetry 95 %   O2 (LPM) 3.5   O2 Delivery Device Nasal Cannula   Pain   Intervention Declines   Cognition    Cognition / Consciousness WDL   Level of Consciousness Alert   ABS (Agitated Behavior Scale)   Agitated Behavior Scale Performed No   Cognitive Pattern Assessment   Cognitive Pattern Assessment Used BIMS   Brief Interview for Mental Status (BIMS)   Repetition of Three Words (First Attempt) 3   Temporal Orientation: Year Correct   Temporal Orientation: Month Accurate within 5 days   Temporal Orientation: Day Incorrect   Recall: \"Sock\" No, could not recall   Recall: \"Blue\" Yes, no cue required   Recall: \"Bed\" Yes, no cue required   BIMS Summary Score 12   Confusion Assessment Method (CAM)   Is there evidence of an acute change in mental status from the patient's baseline? No   Inattention Behavior not present   Disorganized thinking Behavior not present   Altered level of consciousness Behavior not present   Passive ROM Upper Body   Passive ROM Upper Body WDL   Active ROM Upper Body   Active ROM Upper Body  WDL   Dominant Hand Left   Comments Delayed coordination and weakness RUE.  Noted prior cervical surgery   Strength Upper Body   Upper Body Strength  X   Comments RUE weakness   Upper Body Muscle Tone   Upper Body Muscle Tone  WDL   Balance Assessment   Sitting Balance (Static) Fair +   Sitting Balance (Dynamic) Fair   Standing Balance (Static) Fair -   Standing Balance (Dynamic) Fair -   Weight Shift Sitting Fair   Weight Shift Standing Fair   Bed Mobility    Supine to Sit Minimal Assist   Coordination Upper Body   Comments Delayed coordination RUE   Eating   Assistance Needed Independent   Physical Assistance Level No physical assistance   CARE Score - Eating 6   Eating Discharge Goal   Discharge Goal 6   Oral Hygiene   Assistance Needed Supervision   Physical Assistance Level No physical assistance   CARE Score - Oral Hygiene 4   Oral Hygiene " Discharge Goal   Discharge Goal 6   Shower/Bathe Self   Assistance Needed Physical assistance;Verbal cues;Supervision;Set-up / clean-up   Physical Assistance Level 26%-50%   CARE Score - Shower/Bathe Self 3   Shower/Bathe Self Discharge Goal   Discharge Goal 5   Upper Body Dressing   Assistance Needed Verbal cues;Supervision;Physical assistance   Physical Assistance Level 25% or less   CARE Score - Upper Body Dressing 3   Upper Body Dressing Discharge Goal   Discharge Goal 6   Lower Body Dressing   Assistance Needed Set-up / clean-up;Supervision;Verbal cues;Physical assistance   Physical Assistance Level 26%-50%   CARE Score - Lower Body Dressing 3   Putting On/Taking Off Footwear   Assistance Needed Supervision;Verbal cues;Set-up / clean-up;Physical assistance   Physical Assistance Level 25% or less   CARE Score - Putting On/Taking Off Footwear 3   Putting On/Taking Off Footwear Discharge Goal   Discharge Goal 6   Toileting Hygiene   Assistance Needed Adaptive equipment;Set-up / clean-up;Supervision;Verbal cues;Physical assistance   Physical Assistance Level 26%-50%   CARE Score - Toileting Hygiene 3   Toileting Hygiene Discharge Goal   Discharge Goal 6   Toilet Transfer   Assistance Needed Adaptive equipment;Set-up / clean-up;Supervision;Verbal cues;Physical assistance   Physical Assistance Level 25% or less   CARE Score - Toilet Transfer 3   Toilet Transfer Discharge Goal   Discharge Goal 6   Hearing, Speech, and Vision   Ability to Hear Highly impaired   Ability to See in Adequate Light Adequate   Expression of Ideas and Wants Some difficulty   Understanding Verbal and Non-Verbal Content Usually understands   Functional Level of Assist   Eating Modified Independent   Eating Description Increased time   Grooming Supervision;Seated   Grooming Description Increased time;Initial preparation for task;Seated in wheelchair at sink;Set-up of equipment;Supervision for safety;Verbal cueing   Bathing Moderate Assist    Bathing Description Grab bar;Hand held shower;Tub bench;Assit with back;Assit wtih lower extremities;Increased time;Initial preparation for task;Set-up of equipment;Supervision for safety;Verbal cueing   Upper Body Dressing Minimal Assist   Upper Body Dressing Description Increased time;Initial preparation for task;Set-up of equipment;Supervision for safety;Assist with closures;Verbal cueing   Lower Body Dressing Moderate Assist   Lower Body Dressing Description Grab bar;Increased time;Initial preparation for task;Set-up of equipment;Supervision for safety;Verbal cueing;Assist with threading into pant leg   Toileting Moderate Assist   Toileting Description Assist to pull pants up;Assist to pull pants down;Assist for standing balance;Grab bar;Increased time;Initial preparation for task;Set-up of equipment;Supervision for safety;Verbal cueing   Bed, Chair, Wheelchair Transfer Minimal Assist   Bed Chair Wheelchair Transfer Description Increased time;Initial preparation for task;Set-up of equipment;Supervision for safety;Verbal cueing   Toilet Transfers Minimal Assist   Toilet Transfer Description Grab bar;Set-up of equipment;Supervision for safety;Verbal cueing;Increased time;Initial preparation for task   Tub / Shower Transfers Minimal Assist   Tub Shower Transfer Description Grab bar;Shower bench;Increased time;Initial preparation for task;Set-up of equipment;Supervision for safety;Verbal cueing   Comprehension Modified Independent   Comprehension Description Hearing aids/amplifiers   Expression Modified Independent   Problem Solving Supervision   Problem Solving Description Increased time;Supervision;Therapy schedule;Verbal cueing   Memory Supervision   Memory Description Increased time;Supervision;Therapy schedule   Problem List   Problem List Decreased Active Daily Living Skills;Decreased Upper Extremity Strength Right;Decreased Functional Mobility;Decreased Activity Tolerance;Safety Awareness Deficits /  Cognition;Impaired Postural Control / Balance   Precautions   Precautions Fall Risk   Comments Sun'aq   Current Discharge Plan   Current Discharge Plan Return to Prior Living Situation   Benefit    Therapy Benefit Patient Would Benefit from Inpatient Rehab Occupational Therapy to Maximize [place] with ADLs, IADLs and Functional Mobility.       Assessment  Patient is 78 y.o. female with a diagnosis of acute ischemic left MCA stroke.  PMH  vertigo, COPD, HTN.  Pt transferred from SNF.  Additional factors influencing patient status / progress (ie: cognitive factors, co-morbidities, social support, etc): R hemiparesis, R side weakness, impaired balance, limited activity tolerance, impaired safety awareness.  Therapist reviewed environmental/safety awareness, fall precautions, AE/DME, ADL's and transfers.      Plan  Recommend Occupational Therapy 30-60 minutes per day 5-6 days per week for 10 days for the following treatments:  OT Self Care/ADL, OT Neuro Re-Ed/Balance, OT Therapeutic Activity, OT Evaluation, and OT Therapeutic Exercise.    Passport items to be completed:  Perform bathroom transfers, complete dressing, complete feeding, get ready for the day, prepare a simple meal, participate in household tasks, adapt home for safety needs, demonstrate home exercise program, complete caregiver training     Goals:  Long term and short term goals have been discussed with patient and they are in agreement.    Occupational Therapy Goals (Active)       There are no active problems.

## 2024-02-10 NOTE — H&P
Physical Medicine & Rehabilitation  History and Physical (H&P)  &     Post Admission Physician Evaluation (SON)       Date of Admission: 2/9/2024  Date of Service: 2/9/2024   Lorraine Lenug  RH09/01    [facility] Code to Support Admission: 0001.2 - Stroke: Right Body Involvement (Left Brain)  Etiologic Diagnosis: Acute ischemic left MCA stroke (HCC)    _______________________________________________    Chief Complaint: Weakness    History of Present Illness:  77 y/o with factor V, CAD, vertigo, anxiety, hypoxia on 3l baseline found down on 1/6. MRI showed large Left MCA stroke. No TPA or thrombectomy was administered. Patient went to SNF and is now admitted to rehabilitaiton.    On exam she complains of intermittent anxiety and vertigo.     Review of Systems:     Comprehensive 14 point ROS was reviewed and all were negative except as noted elsewhere in this document.     Past Medical History:  No past medical history on file.    Past Surgical History:  No past surgical history on file.    Family History:  No family history on file.    Medications:  Current Facility-Administered Medications   Medication Dose    [START ON 2/10/2024] amLODIPine (Norvasc) tablet 10 mg  10 mg    [START ON 2/10/2024] aspirin (Asa) chewable tab 81 mg  81 mg    atorvastatin (Lipitor) tablet 40 mg  40 mg    [START ON 2/10/2024] clopidogrel (Plavix) tablet 75 mg  75 mg    cyclobenzaprine (Flexeril) tablet 10 mg  10 mg    gabapentin (Neurontin) capsule 100 mg  100 mg    [START ON 2/10/2024] losartan (Cozaar) tablet 100 mg  100 mg    metoprolol tartrate (Lopressor) tablet 100 mg  100 mg    montelukast (Singulair) tablet 10 mg  10 mg    [START ON 2/10/2024] PARoxetine (Paxil) tablet 40 mg  40 mg    Respiratory Therapy Consult      Pharmacy Consult Request ...Pain Management Review 1 Each  1 Each    hydrALAZINE (Apresoline) tablet 25 mg  25 mg    senna-docusate (Pericolace Or Senokot S) 8.6-50 MG per tablet 2 Tablet  2 Tablet    And    polyethylene  glycol/lytes (Miralax) Packet 1 Packet  1 Packet    [START ON 2/10/2024] omeprazole (PriLOSEC) capsule 20 mg  20 mg    ondansetron (Zofran ODT) dispertab 4 mg  4 mg    Or    ondansetron (Zofran) syringe/vial injection 4 mg  4 mg    traZODone (Desyrel) tablet 50 mg  50 mg    sodium chloride (Ocean) 0.65 % nasal spray 2 Spray  2 Spray    meclizine (Antivert) tablet 12.5 mg  12.5 mg    meclizine (Antivert) tablet 12.5 mg  12.5 mg    hydrOXYzine HCl (Atarax) tablet 25 mg  25 mg       Allergies:  Patient has no allergy information on record.    Psychosocial History:  Living Site:  Home  Living With:  family    Level of Function Prior to Disability:    Independent    Level of Function Prior to Admission to [facility]:  Min to mod assist for mobility ADL and Max for toileting    CURRENT LEVEL OF FUNCTION:   Same as level of function prior to admission to [facility]    Physical Examination:     VITAL SIGNS:   weight is 64.4 kg (141 lb 14.4 oz). Her oral temperature is 36.5 °C (97.7 °F). Her blood pressure is 136/71 and her pulse is 82. Her respiration is 18 and oxygen saturation is 94%.   General: Well developed, Well nourished, No Acute Distress  HEENT: Normocephalic, atraumatic. Anicteric sclera  Cardiac: Physiologic rate and regular rhythm, no murmurs  Pulmonary: Lungs are clear to auscultation bilaterally, comfortable on room air  Abdomen: Soft, non-tender, non-distended. Bowel sounds normoactive.   Extremities: Warm and well perfused, no clubbing, cyanosis. noedema.   Skin: No visible rashes or lesions.   Psych: calm, no agitation, congruent mood/affect    NEUROLOGIC EXAM:  Gen:  Alert and oriented to person, place, date, and circumstance. Appropriately interactive  Speech/Language/Comprehension: Paraphasic errors, follows simple and complex commands without L/R confusion .?  Attention: Attentive to conversation.   Memory: Recalls her primary reason for being in the  hospital  Judgment: Demonstrates appropriate use of call light   Praxis:  Pantomimes brushing hair and teeth.???    Cranial Nerves:   II:   Visual fields full to confrontation. Pupils equally round & reactive to light. ??  III, IV, VI:  EOMI w/o nystagmus or diplopia. No ptosis.????  V:  Sensation intact to light touch. ??  VII:  Mild right facial droop  VIII:  Hears functionally.????  IX, X:  Voice normal. Palate elevates symmetrically.????  XI:  Trapezii full.????  XII:  Tongue protrudes midline.????    Motor:?  ?? Delt???? Bi???? Tri???? Wrist ??  Ext?? DIP flex ??  (FDP)?? Finger ABd?? IP???? Quad???? Hamst???? TibAnt???? EHL???? Plantar  flexion   ???? C5???? C6???? C7???? ?C6?? C8/T1?? C8/T1???? L2???? L3???? L4-S1???? L4???? L5???? S1   L???? 5/5 5/5 5/5 5/5 5/5 5/5 5/5 5/5 5/5 5/5 5/5 5/5   R???? 4/5 4/5 4/5 4/5 4/5 4/5 4/5 4/5 4/5 4/5 4/5 4/5       Sensation: Intact to light touch in the major dermatomes of the upper and lower extremities.     Coordination: Normal finger-to-nose testing without dysmetria. Normal rapid alternating movements.     Reflexes: Reflexes are 2+ at the biceps, triceps, brachioradialis, patella, and achilles              Primary Rehabilitation Diagnosis:    This patient is a 78 y.o. female admitted for acute inpatient rehabilitation with Acute ischemic left MCA stroke (HCC).    Impairments:   Cognitive  ADLs/IADLs  Mobility  Speech  Swallow    Secondary Diagnosis/Medical Co-morbidities Affecting Function  HTN, Virtigo, Hypoarousal    Relevant Changes Since Preadmission Evaluation:    Status unchanged    The patient's rehabilitation potential is Good  The patient's medical prognosis is good    Rehabilitation Plan:   Discussion and Recommendations:   1. The patient requires an acute inpatient rehabilitation program with a coordinated program of care at an intensity and frequency not available at a lower level of care. This recommendation is substantiated by the patient's medical  physicians who recommend that the patient's intervention and assessment of medical issues needs to be done at an acute level of care for patient's safety and maximum outcome.   2. A coordinated program of care will be supplied by an interdisciplinary team of physical therapy, occupational therapy, rehab physician, rehab nursing, and, if needed, speech therapy and rehab psychology. Rehab team presents a patient-specific rehabilitation and education program concentrating on prevention of future problems related to accessibility, mobility, skin, bowel, bladder, sexuality, and psychosocial and medical/surgical problems.   3. Need for Rehabilitation Physician: The rehab physician will be evaluating the patient on a multi-weekly basis to help coordinate the program of care. The rehab physician communicates between medical physicians, therapists, and nurses to maximize the patient's potential outcome. Specific areas in which the rehab physician will be providing daily assessment include the following:   A. Assessing the patient's heart rate and blood pressure response (vitals monitoring) to activity and making adjustments in medications or conservative measures as needed.   B. The rehab physician will be assessing the frequency at which the program can be increased to allow the patient to reach optimal functional outcome.   C. The rehab physician will also provide assessments in daily skin care, especially in light of patient's impairments in mobility.   D. The rehab physician will provide special expertise in understanding how to work with functional impairment and recommend appropriate interventions, compensatory techniques, and education that will facilitate the patient's outcome.   4. Rehab R.N.   The rehab RN will be working with patient to carry over in room mobility and activities of daily living when the patient is not in 3 hours of skilled therapy. Rehab nursing will be working in conjunction with rehab physician  to address all the medical issues above and continue to assess laboratory work and discuss abnormalities with the treating physicians, assess vitals, and response to activity, and discuss and report abnormalities with the rehab physician. Rehab RN will also continue daily skin care, supervise bladder/bowel program, instruct in medication administration, and ensure patient safety.   5. Rehab Therapy: Therapies to treat at intensity and frequency of (may change after completion of evaluation by all therapeutic disciplines):       PT:  Physical therapy to address mobility, transfer, gait training and evaluation for adaptive equipment needs 1 hour/day at least 5 days/week for the duration of the ELOS (see below)       OT:  Occupational therapy to address ADLs, self-care, home management training, functional mobility/transfers and assistive device evaluation, and community re-integration 1  hour/day at least 5 days/week for the duration of the ELOS (see below).        ST/Dysphagia:  Speech therapy to address speech, language, and cognitive deficits as well as swallowing difficulties with retraining/dysphagia management and community re-integration with comprehension, expression, cognitive training 1  hour/day at least 5 days/week for the duration of the ELOS (see below).     Medical management / Rehabilitation Issues/ Adverse Potential as part of rehabilitation plan     Rehabilitation Issues/Adverse Potential  1.  CVA (Cerebrovascular Accident): Cont ASA, statin for secondary prophylaxis as well as lipid and blood pressure management. Patient demonstrates functional deficits in strength, balance, coordination, and ADL's. Patient is admitted to [facility] for comprehensive rehabilitation therapy as described below.   Rehabilitation nursing monitors bowel and bladder control, educates on medication administration, co-morbidities and monitors patient safety.    2.  Neurostimulants: None at this time but  continue to assess daily for need to initiate should status change.    3.  DVT prophylaxis:  Patient is on Lovenox for anticoagulation upon transfer. Encourage OOB. Monitor daily for signs and symptoms of DVT including but not limited to swelling and pain to prevent the development of DVT that may interfere with therapies.    4.  GI prophylaxis:  On prilosec to prevent gastritis/dyspepsia which may interfere with therapies.    5.  Pain: No issues with pain currently / Controlled with tylenol    6.  Nutrition/Dysphagia: Dietician monitors nutrient intake, recommend supplements prn and provide nutrition education to pt/family to promote optimal nutrition for wound healing/recovery.     7.  Bladder/bowel:  Start bowel and bladder program as described below, to prevent constipation, urinary retention (which may lead to UTI), and urinary incontinence (which will impact upon pt's functional independence).   - TV Q3h while awake with post void bladder scans, I&O cath for PVRs >400  - up to commode after meal     8.  Skin/dermal ulcer prophylaxis: Monitor for new skin conditions with q.2 h. turns as required to prevent the development of skin breakdown.     9.  Cognition/Behavior: As needed psychologist provides adjustment counseling to illness and psychosocial barriers that may be potential barriers to rehabilitation.     10. Respiratory therapy: RT performs O2 management prn, breathing retraining, pulmonary hygiene and bronchospasm management prn to optimize participation in therapies.     Medical Co-Morbidities/Adverse Potential Affecting Function:        CVA:  - Patient with L MCA stroke on 1/6 e. Etiology likely Unclear . Received NO intervention  - Continue ASA and statin for secondary stroke prophylaxis.  -Zio patch    Factor V leiden  -has not been on anticoagulation for 10 years  -bilateral  US to rule out blood clots    Vertigo  -PT to test BPPV  -Meclizine 12.5mg BID    CAD  -Pavix    COPD  -on 3l at  home  -Montelucast    HTN  -Norvasc 10mg daily  Losartan 100mg daily  Lopressor 100mg BID    Lumbar back pain  Flexeril and gabapentin    Cognitive Communicative deficits:  - Patient with significant cognitive deficits due to stroke  - Environmental modifications to decrease excessive stimulation including turning off the lights  - Consider starting neurostimulants such Nuvigil, amantadine or methylphenidate  - SLP to evaluate and treat cognitive deficits.   -Neuropsych will follow and perform testing if/when appropriate.      Hemiparesis:  - Patient with Rdom hemiparesis  - consider starting SSRI per FLAME trial to facilitate post-stroke motor recovery  - Continue aggressive therapies with PT and OT to strengthen and optimize function.    Spasticity:  - Patient at risk ofincreased tone in the affected limbs/muscles  - PT and OT to initiate aggressive stretching, ROM exercises, bracing, splinting, casting and positioning as appropriate.  - If the tone fails to improve with the above conservative measures, consider further intervention with botulinum toxin or phenol injections.    Dysphagia/Nutrition:  - Patient currently on Regular DIET.  - Continue therapies with SLP. SLP to perform swallow evaluation and provide oral motor exercises if necessary.      Neurogenic bladder:  - Timed voids with PVR q4H x3. If PVR > 400mL or if patient is unable to void, straight cath patient.    Neurogenic bowel:  -  Colace, Senna BID on admission  - Goal of 1BM/day.  -        Circadian Rhythm disorder:   Recommend lights on during the day/off at night, minimize nighttime interruptions as able.     Mood  Anxiety  -Paxil  -atarax PRN    - at risk of adjustment disorder, depression, and anxiety due to functional decline    ID:  - at risk for Urinary tract infection    Skin/Wounds:  - Pressure relief q2h while in bed. Close monitoring for signs of breakdown    Pain:  - Neuroceptic - On Tylenol prn    DVT prophylaxis:  Patient is on  Lovenox.     GI prophylaxis:  On Prilosec 20mg daily       -Follow-up Stroke bridge, heme    I personally performed a complete drug regimen review and no potential clinically significant medication issues were identified.     Goals/Expected Level of Function Based on Current Medical and Functional Status:  (may change based on patient's medical status and rate of impairment recovery)  Transfers:   Supervision  Mobility/Gait:   Supervision  ADL's:   Supervision  Cognition:  min cues  Swallowing:  least restrictive diet    DISPOSITION: Discharge to pre-morbid independent living setting with the supportive care of patient's family.    ELOS: 10-14 days  ____________________________________    Carlin Powers MD  Physical Medicine & Rehabilitation   Brain Injury Medicine   ____________________________________    Pt was seen today for 79 min, and entire time spent in face-to-face contact was >50% in counseling and coordination of care as detailed in A/P above.

## 2024-02-10 NOTE — THERAPY
Physical Therapy   Initial Evaluation     Patient Name: Lorraine Leung  Age:  78 y.o., Sex:  female  Medical Record #: [id number]  Today's Date: 2/10/2024     Subjective    Pt was greeted supine in bed and is a pleasant and cooperative woman. Family was present during IE.      Objective       02/10/24 1231   Prior Living Situation   Prior Services Continuous (24 Hour) Care Giving Per Service  (Transferred from SNF)   Housing / Facility 1 Story House   Steps Into Home 2   Steps In Home 0   Rail None   Equipment Owned Single Point Cane;4-Wheel Walker;Oxygen   Lives with - Patient's Self Care Capacity Alone and Able to Care For Self   Comments Pt plans to DC to [facility] with daughter also living with her.   Prior Level of Functional Mobility   Bed Mobility Independent   Transfer Status Independent   Ambulation Independent   Assistive Devices Used Single Point Cane   Stairs Independent   Prior Functioning: Everyday Activities   Self Care Independent   Indoor Mobility (Ambulation) Independent   Stairs Independent   Functional Cognition Independent   Prior Device Use None of the given options  (SPC for community distances)   Vitals   Pulse 62   Patient BP Position Sitting   Blood Pressure  98/55   Pulse Oximetry 95 %   O2 (LPM) 4   O2 Delivery Device Nasal Cannula   Cognition    Level of Consciousness Alert   ABS (Agitated Behavior Scale)   Agitated Behavior Scale Performed No   Passive ROM Lower Body   Passive ROM Lower Body WDL   Active ROM Lower Body    Active ROM Lower Body  WDL   Strength Lower Body   Lower Body Strength  WDL   Sensation Lower Body   Lower Extremity Sensation   X   Rt Lower Extremity Light Touch Impaired   Rt Lower Extremity Proprioception Impaired   Lt Lower Extremity Proprioception Impaired   Lower Body Muscle Tone   Lower Body Muscle Tone  WDL   Balance Assessment   Sitting Balance (Static) Fair   Sitting Balance (Dynamic) Fair -   Standing Balance (Static) Poor   Standing Balance (Dynamic) Poor -    Weight Shift Sitting Fair   Weight Shift Standing Fair   Bed Mobility    Supine to Sit Minimal Assist   Sit to Supine Minimal Assist  ([place] for LLE into bed)   Sit to Stand Moderate Assist   Scooting Contact Guard Assist   Rolling Contact Guard Assist   Neurological Concerns   Neurological Concerns Yes   Sitting Posture During ADL's Lateral Lean Right   Standing Posture During ADL's Lateral Lean Right   Comments Proprioception   Coordination Lower Body    Coordination Lower Body  X   Heel To Shin Right Impaired   Other Right Impaired  (Finger to nose test)   Roll Left and Right   Assistance Needed Incidental touching   CARE Score - Roll Left and Right 4   Roll Left and Right Discharge Goal   Discharge Goal 6   Sit to Lying   Assistance Needed Physical assistance   Physical Assistance Level 25% or less   CARE Score - Sit to Lying 3   Sit to Lying Discharge Goal   Discharge Goal 6   Lying to Sitting on Side of Bed   Assistance Needed Physical assistance   Physical Assistance Level 25% or less   CARE Score - Lying to Sitting on Side of Bed 3   Lying to Sitting on Side of Bed Discharge Goal   Discharge Goal 6   Sit to Stand   Assistance Needed Physical assistance   Physical Assistance Level 26%-50%   CARE Score - Sit to Stand 3   Sit to Stand Discharge Goal   Discharge Goal 4   Chair/Bed-to-Chair Transfer   Assistance Needed Physical assistance   Physical Assistance Level 26%-50%   CARE Score - Chair/Bed-to-Chair Transfer 3   Chair/Bed-to-Chair Transfer Discharge Goal   Discharge Goal 4   Toilet Transfer   Reason if not Attempted Refused to perform   CARE Score - Toilet Transfer 7   Toilet Transfer Discharge Goal   Discharge Goal 6   Car Transfer   Reason if not Attempted Safety concerns   CARE Score - Car Transfer 88   Car Transfer Discharge Goal   Discharge Goal 4   Walk 10 Feet   Assistance Needed Physical assistance   Physical Assistance Level 25% or less   CARE Score - Walk 10 Feet 3   Walk 10 Feet Discharge Goal    Discharge Goal 4   Walk 50 Feet with Two Turns   Assistance Needed Physical assistance   Physical Assistance Level 25% or less   CARE Score - Walk 50 Feet with Two Turns 3   Walk 50 Feet with Two Turns Discharge Goal   Discharge Goal 4   Walk 150 Feet   Reason if not Attempted Safety concerns   CARE Score - Walk 150 Feet 88   Walk 150 Feet Discharge Goal   Discharge Goal 4   Walking 10 Feet on Uneven Surfaces   Reason if not Attempted Safety concerns   CARE Score - Walking 10 Feet on Uneven Surfaces 88   Walking 10 Feet on Uneven Surfaces Discharge Goal   Discharge Goal 4   1 Step (Curb)   Assistance Needed Physical assistance   Physical Assistance Level 25% or less   CARE Score - 1 Step (Curb) 3   1 Step (Curb) Discharge Goal   Discharge Goal 4   4 Steps   Assistance Needed Physical assistance   Physical Assistance Level 25% or less   CARE Score - 4 Steps 3   4 Steps Discharge Goal   Discharge Goal 4   12 Steps   Reason if not Attempted Safety concerns   CARE Score - 12 Steps 88   12 Steps Discharge Goal   Discharge Goal 4   Picking Up Object   Reason if not Attempted Safety concerns   CARE Score - Picking Up Object 88   Picking Up Object Discharge Goal   Discharge Goal 4   Wheel 50 Feet with Two Turns   Assistance Needed Physical assistance   Physical Assistance Level 25% or less   CARE Score - Wheel 50 Feet with Two Turns 3   Wheel 50 Feet with Two Turns Discharge Goal   Discharge Goal 6   Wheel 150 Feet   Reason if not Attempted Safety concerns   CARE Score - Wheel 150 Feet 88   Wheel 150 Feet Discharge Goal   Discharge Goal 6   Gait Functional Level of Assist    Gait Level Of Assist Minimal Assist   Assistive Device Front Wheel Walker   Distance (Feet) 50  (+10)   # of Times Distance was Traveled 1   Deviation Bradykinetic;Decreased Heel Strike;Decreased Toe Off;Increased Base Of Support;Step To   Wheelchair Functional Level of Assist   Wheelchair Assist Minimal Assist  (Jody for steering to the L)   Distance  Wheelchair (Feet or Distance) 60   Wheelchair Description Limited by fatigue;Supervision for safety;Verbal cueing;Leg rest management;Extra time;Assistance with steering   Stairs Functional Level of Assist   Level of Assist with Stairs Minimal Assist   # of Stairs Climbed 4   Stairs Description Hand rails;Extra time;Limited by fatigue;Oxygen;Supervision for safety;Verbal cueing   Transfer Functional Level of Assist   Bed, Chair, Wheelchair Transfer Moderate Assist   Bed Chair Wheelchair Transfer Description Increased time;Initial preparation for task;Set-up of equipment;Squat pivot transfer to wheelchair;Supervision for safety;Verbal cueing   Problem List    Problems Impaired Transfers;Impaired Ambulation;Functional Strength Deficit;Impaired Balance;Impaired Coordination;Decreased Activity Tolerance;Safety Awareness Deficits / Cognition;Motor Planning / Sequencing   Precautions   Precautions Fall Risk  (No BP on R arm)   Comments [place], pt wears headphones a   Current Discharge Plan   Current Discharge Plan Assisted Living Facility  (with daughter)   Interdisciplinary Plan of Care Collaboration   IDT Collaboration with  Occupational Therapist   Patient Position at End of Therapy In Bed;Bed Alarm On;Call Light within Reach;Tray Table within Reach;Phone within Reach;Family / Friend in Room   Collaboration Comments CLOF   PT DME Recommendations   Assistive Device Front Wheeled Walker;Single Point Cane   Physical Therapist Assigned   Assigned PT / Treatment Time / Comments IVORY   Benefit   Therapy Benefit Patient Would Benefit from Inpatient Rehabilitation Physical Therapy to Maximize Functional [place] with ADLs, IADLs and Mobility.   Strengths & Barriers   Strengths Able to follow instructions;Adequate strength;Alert and oriented;Effective communication skills;Independent prior level of function;Motivated for self care and independence;Manages pain appropriately;Pleasant and cooperative;Supportive family;Willingly  participates in therapeutic activities   Barriers Fatigue;Decreased endurance;Hearing impairment;Hemiplegia;Impaired activity tolerance;Impaired balance;Pain       Patient education: reviewed PT plan of care, rehab expectations, mobility needs and patient passport, orientation to unit, role of PT, fall risk and use of call light.     Assessment    The patient is a 78 y.o. female admitted to [facility] inpatient rehabilitation 2/9/2024 with severe functional debility after acute L MCA stroke on 1/6. No TPA or thrombectomy was administered and pt was transferred to a SNF. Pt's PLOF was independent with all mobility and ADLs. Pt is planning on discharging to [facility] with 24 hour support from her daughter. Pt currently has balance deficits, R sided hemiplegia, coordination deficits, L sided shoulder pain, poor activity tolerance, fatigue, mild R neglect, and incontinence that are limiting her functional independence. Pt will require daily skilled physical therapy in order to address deficits listed above.     Patient's PMH includes:  History reviewed. No pertinent past medical history.      PT evaluation performed today; functional performance at today's assessment is as above.     Additional factors influencing patient status and prognosis include: independent prior level of function, history of CAD, vertigo, anxiety, hypoxia on 3L baseline, supportive family, high levels of motivation, and the above comorbidities.     The patient is performing well below their baseline level of function and will benefit from an interdisciplinary high intensity rehabilitation program to maximize functional independence, decrease burden of care, and support a safe return to [facility] with 24 hour family support.     Plan  Recommend Physical Therapy  minutes per day 5-7 days per week for 2 weeks for the following treatments:  PT Gait Training, PT Therapeutic Exercises, PT TENS Application, PT Neuro Re-Ed/Balance, PT Aquatic Therapy, PT  Therapeutic Activity, PT Manual Therapy, and PT Evaluation.    Passport items to be completed:  Get in/out of bed safely, in/out of a vehicle, safely use mobility device, walk or wheel around home/community, navigate up and down stairs, show how to get up/down from the ground, ensure home is accessible, demonstrate HEP, complete caregiver training    Goals:  Long term and short term goals have been discussed with patient and family and they are in agreement.       Physical Therapy Problems (Active)       Problem: Balance       Dates: Start:  02/10/24         Goal: STG-Within one week, patient will maintain static standing for at least 2 minutes using FWW with single UE support and CGA to facilitate standing ADLs        Dates: Start:  02/10/24               Problem: Mobility       Dates: Start:  02/10/24         Goal: STG-Within one week, patient will ambulate household distance of 50 feet using FWW CGA        Dates: Start:  02/10/24            Goal: STG-Within one week, patient will ascend and descend three to four stairs CGA       Dates: Start:  02/10/24               Problem: Mobility Transfers       Dates: Start:  02/10/24         Goal: STG-Within one week, patient will perform stand step transfer from bed to chair Jody using FWW        Dates: Start:  02/10/24               Problem: PT-Long Term Goals       Dates: Start:  02/10/24         Goal: LTG-By discharge, patient will tolerate standing for at least 5 minutes SPV with LRAD in order to facilitate standing ADLs       Dates: Start:  02/10/24            Goal: LTG-By discharge, patient will ambulate community distances of 150' SPV with LRAD       Dates: Start:  02/10/24            Goal: LTG-By discharge, patient will transfer one surface to another SPV using LRAD        Dates: Start:  02/10/24            Goal: LTG-By discharge, patient will ambulate up/down 4-6 stairs SPV using bilateral hand rails       Dates: Start:  02/10/24

## 2024-02-10 NOTE — PROGRESS NOTES
NURSING DAILY NOTE    Name: Lorraine Leung   Date of Admission: 2/9/2024   Admitting Diagnosis: Acute ischemic left MCA stroke (HCC)  Attending Physician: Carlin Powers M.d.  Allergies: Patient has no allergy information on record.    Safety  Patient Assist  Min A  Patient Precautions     Precaution Comments     Bed Transfer Status     Toilet Transfer Status      Assistive Devices  Rails, Wheelchair  Oxygen  Nasal Cannula  Diet/Therapeutic Dining  No Active Diet Orders    Pill Administration  whole  Agitated Behavioral Scale     ABS Level of Severity       Fall Risk  Has the patient had a fall this admission?   No  Tiffani Hinton Fall Risk Scoring  11, MODERATE RISK  Fall Risk Safety Measures  bed alarm, chair alarm, poor balance, and low vision/ hearing    Vitals  Temperature: 37 °C (98.6 °F)  Temp src: Oral  Pulse: 76  Respiration: 16  Blood Pressure : 124/73  Blood Pressure MAP (Calculated): 90 MM HG  BP Location: Right, Upper Arm  Patient BP Position: Supine     Oxygen  Pulse Oximetry: 96 %  O2 (LPM): 3.5  O2 Delivery Device: Nasal Cannula    Bowel and Bladder  Last Bowel Movement  02/09/24  Stool Type     Bowel Device  Diaper  Continent  Bladder: Did not void   Bowel: No movement  Bladder Function  Urine Void (mL):  (Large)  Number of Times Voided: 2  Urine Color: Yellow  Genitourinary Assessment   Bladder Assessment (WDL):  WDL Except  Villela Catheter: Not Applicable  Urine Color: Yellow  Bladder Device: Bathroom  Time Void: No  Bladder Scan: Post Void  $ Bladder Scan Results (mL): 287    Skin  Selwyn Score   17  Sensory Interventions   Bed Types: Standard/Trauma Mattress  Skin Preventative Measures: Pillows in Use for Support / Positioning  Moisture Interventions  Moisturizers/Barriers: Barrier Paste      Pain  Pain Rating Scale  1 - Hardly Notice Pain  Pain Location  Back, Neck  Pain Location Orientation  Posterior  Pain Interventions    Rest    ADLs    Bathing      Linen Change      Personal Hygiene     Chlorhexidine Bath      Oral Care     Teeth/Dentures     Shave     Nutrition Percentage Eaten     Environmental Precautions     Patient Turns/Positioning  Patient Turns Self from Side to Side  Patient Turns Assistance/Tolerance     Bed Positions     Head of Bed Elevated         Psychosocial/Neurologic Assessment  Psychosocial Assessment  Psychosocial (WDL):  Within Defined Limits  Neurologic Assessment  Neuro (WDL): Exceptions to WDL  Level of Consciousness: Alert  Orientation Level: Oriented X4  Cognition: Appropriate judgement, Follows commands  Speech: Clear  Pupil Assesment: Yes  R Pupil Size (mm): 3  R Pupil Shape / Description: Round  R Pupil Reaction: Brisk  L Pupil Size (mm): 3  L Pupil Shape / Description: Round  L Pupil Reaction: Brisk  RUE Motor Response: Responds to commands  RUE Sensation: Numbness, Tingling  Muscle Strength Right Arm: Fair Strength against Gravity but No Resistance  RLE Motor Response: Responds to commands  RLE Sensation: Numbness, Tingling  Muscle Strength Right Leg: Fair Strength against Gravity but No Resistance  EENT (WDL):  WDL Except    Cardio/Pulmonary Assessment  Edema      Respiratory Breath Sounds  RUL Breath Sounds: Clear  RML Breath Sounds: Crackles  RLL Breath Sounds: Crackles  KASEY Breath Sounds: Clear  LLL Breath Sounds: Crackles  Cardiac Assessment   Cardiac (WDL):  WDL Except

## 2024-02-10 NOTE — PROGRESS NOTES
NURSING DAILY NOTE    Name: Lorraine Leung   Date of Admission: 2/9/2024   Admitting Diagnosis: Acute ischemic left MCA stroke (HCC)  Attending Physician: Carlin Powers M.d.  Allergies: Patient has no allergy information on record.    Safety  Patient Assist     Patient Precautions     Precaution Comments     Bed Transfer Status     Toilet Transfer Status      Assistive Devices  Rails, Wheelchair  Oxygen  Nasal Cannula  Diet/Therapeutic Dining  No Active Diet Orders    Pill Administration  whole  Agitated Behavioral Scale     ABS Level of Severity       Fall Risk  Has the patient had a fall this admission?   No  Tiffani Hinton Fall Risk Scoring  9, LOW RISK  Fall Risk Safety Measures  bed alarm, chair alarm, poor balance, and low vision/ hearing    Vitals  Temperature: 36.5 °C (97.7 °F)  Temp src: Oral  Pulse: 97 (Simultaneous filing. User may not have seen previous data.)  Respiration: 18  Blood Pressure : 113/67  Blood Pressure MAP (Calculated): 82 MM HG  BP Location: Right, Upper Arm  Patient BP Position: Supine     Oxygen  Pulse Oximetry: 97 %  O2 (LPM): 3.5  O2 Delivery Device: Nasal Cannula    Bowel and Bladder  Last Bowel Movement  02/09/24  Stool Type     Bowel Device  Diaper  Continent  Bladder: Did not void   Bowel: No movement  Bladder Function     Genitourinary Assessment   Bladder Assessment (WDL):  WDL Except  Villela Catheter: Not Applicable  Bladder Device: Diaper  Time Void: Yes    Skin  Selwyn Score   16  Sensory Interventions   Bed Types: Standard/Trauma Mattress  Moisture Interventions  Moisturizers/Barriers: Moisturizer       Pain  Pain Rating Scale  3 - Sometimes distracts me  Pain Location  Back, Neck  Pain Location Orientation  Posterior  Pain Interventions   Emotional Support    ADLs    Bathing      Linen Change      Personal Hygiene     Chlorhexidine Bath      Oral Care     Teeth/Dentures     Shave     Nutrition Percentage Eaten      Environmental Precautions     Patient Turns/Positioning     Patient Turns Assistance/Tolerance     Bed Positions     Head of Bed Elevated         Psychosocial/Neurologic Assessment  Psychosocial Assessment  Psychosocial (WDL):  Within Defined Limits  Neurologic Assessment  Neuro (WDL): Exceptions to WDL  Level of Consciousness: Alert  Orientation Level: Oriented X4  Cognition: Appropriate judgement, Follows commands  Speech: Clear  Pupil Assesment: Yes  R Pupil Size (mm): 3  R Pupil Shape / Description: Round  R Pupil Reaction: Brisk  L Pupil Size (mm): 3  L Pupil Shape / Description: Round  L Pupil Reaction: Brisk  RUE Motor Response: Responds to commands  RUE Sensation: Numbness, Tingling  Muscle Strength Right Arm: Fair Strength against Gravity but No Resistance  RLE Motor Response: Responds to commands  RLE Sensation: Numbness, Tingling  Muscle Strength Right Leg: Fair Strength against Gravity but No Resistance  EENT (WDL):  WDL Except    Cardio/Pulmonary Assessment  Edema      Respiratory Breath Sounds  RUL Breath Sounds: Clear  RML Breath Sounds: Crackles  RLL Breath Sounds: Crackles  KASEY Breath Sounds: Clear  LLL Breath Sounds: Crackles  Cardiac Assessment   Cardiac (WDL):  WDL Except

## 2024-02-10 NOTE — CARE PLAN
"The patient is Stable - Low risk of patient condition declining or worsening       Progress made toward(s) clinical / shift goals:      Problem: Pain - Standard  Goal: Alleviation of pain or a reduction in pain to the patient’s comfort goal  Outcome: Progressing  Note: Scheduled flexeril and gabapentin given per MAR for c/o neck and back pain with adequate relief. Pt sleeps good. Will continue to monitor.     Problem: Fall Risk - Rehab  Goal: Patient will remain free from falls  Outcome: Progressing  Note: Tiffani Hinton Fall risk Assessment Score: 11    Moderate fall risk Interventions  - Bed and strip alarm   - Yellow sign by the door   - Yellow wrist band \"Fall risk\"  - Room near to the nurse station  - Do not leave patient unattended in the bathroom  - Fall risk education provided  Pt calls appropriately when in need of assistance. No impulsivity noted this shift.         Patient is not progressing towards the following goals:      "

## 2024-02-10 NOTE — FLOWSHEET NOTE
02/10/24 0703   Events/Summary/Plan   Events/Summary/Plan 02 pulse ox check   Vital Signs   Pulse 68   Respiration 16   Pulse Oximetry 97 %   $ Pulse Oximetry (Spot Check) Yes   Respiratory Assessment   Level of Consciousness Alert   Chest Exam   Work Of Breathing / Effort Within Normal Limits   Oxygen   O2 (LPM) 3.5   O2 Delivery Device Silicone Nasal Cannula

## 2024-02-11 ENCOUNTER — ANCILLARY PROCEDURE (OUTPATIENT)
Dept: CARDIOLOGY | Facility: REHABILITATION | Age: 79
DRG: 056 | End: 2024-02-11
Attending: PHYSICAL MEDICINE & REHABILITATION
Payer: MEDICARE

## 2024-02-11 ENCOUNTER — APPOINTMENT (OUTPATIENT)
Dept: SPEECH THERAPY | Facility: REHABILITATION | Age: 79
DRG: 056 | End: 2024-02-11
Attending: PHYSICAL MEDICINE & REHABILITATION
Payer: MEDICARE

## 2024-02-11 PROCEDURE — 94760 N-INVAS EAR/PLS OXIMETRY 1: CPT

## 2024-02-11 PROCEDURE — 700111 HCHG RX REV CODE 636 W/ 250 OVERRIDE (IP): Mod: JZ | Performed by: PHYSICAL MEDICINE & REHABILITATION

## 2024-02-11 PROCEDURE — 770005 HCHG ROOM/CARE - REHAB PRIVATE (11*

## 2024-02-11 PROCEDURE — 93970 EXTREMITY STUDY: CPT

## 2024-02-11 PROCEDURE — A9270 NON-COVERED ITEM OR SERVICE: HCPCS | Performed by: PHYSICAL MEDICINE & REHABILITATION

## 2024-02-11 PROCEDURE — 97129 THER IVNTJ 1ST 15 MIN: CPT

## 2024-02-11 PROCEDURE — 93970 EXTREMITY STUDY: CPT | Mod: 26 | Performed by: INTERNAL MEDICINE

## 2024-02-11 PROCEDURE — 97130 THER IVNTJ EA ADDL 15 MIN: CPT

## 2024-02-11 PROCEDURE — 700102 HCHG RX REV CODE 250 W/ 637 OVERRIDE(OP): Performed by: PHYSICAL MEDICINE & REHABILITATION

## 2024-02-11 PROCEDURE — 92526 ORAL FUNCTION THERAPY: CPT

## 2024-02-11 RX ORDER — ACETAMINOPHEN 325 MG/1
650 TABLET ORAL EVERY 6 HOURS PRN
Status: DISCONTINUED | OUTPATIENT
Start: 2024-02-11 | End: 2024-02-23 | Stop reason: HOSPADM

## 2024-02-11 RX ADMIN — GABAPENTIN 100 MG: 100 CAPSULE ORAL at 10:49

## 2024-02-11 RX ADMIN — ENOXAPARIN SODIUM 40 MG: 100 INJECTION SUBCUTANEOUS at 18:28

## 2024-02-11 RX ADMIN — CLOPIDOGREL BISULFATE 75 MG: 75 TABLET ORAL at 10:48

## 2024-02-11 RX ADMIN — MECLIZINE HYDROCHLORIDE 12.5 MG: 25 TABLET ORAL at 10:48

## 2024-02-11 RX ADMIN — MONTELUKAST 10 MG: 10 TABLET, FILM COATED ORAL at 20:56

## 2024-02-11 RX ADMIN — CYCLOBENZAPRINE 10 MG: 10 TABLET, FILM COATED ORAL at 10:49

## 2024-02-11 RX ADMIN — OMEPRAZOLE 20 MG: 20 CAPSULE, DELAYED RELEASE ORAL at 10:48

## 2024-02-11 RX ADMIN — PAROXETINE HYDROCHLORIDE 40 MG: 20 TABLET, FILM COATED ORAL at 10:49

## 2024-02-11 RX ADMIN — AMLODIPINE BESYLATE 10 MG: 5 TABLET ORAL at 05:50

## 2024-02-11 RX ADMIN — LOSARTAN POTASSIUM 100 MG: 50 TABLET, FILM COATED ORAL at 05:50

## 2024-02-11 RX ADMIN — ACETAMINOPHEN 650 MG: 325 TABLET ORAL at 21:24

## 2024-02-11 RX ADMIN — GABAPENTIN 100 MG: 100 CAPSULE ORAL at 20:57

## 2024-02-11 RX ADMIN — ATORVASTATIN CALCIUM 40 MG: 40 TABLET, FILM COATED ORAL at 20:57

## 2024-02-11 RX ADMIN — CYCLOBENZAPRINE 10 MG: 10 TABLET, FILM COATED ORAL at 20:57

## 2024-02-11 RX ADMIN — MECLIZINE HYDROCHLORIDE 12.5 MG: 25 TABLET ORAL at 20:57

## 2024-02-11 RX ADMIN — ASPIRIN 81 MG 81 MG: 81 TABLET ORAL at 10:49

## 2024-02-11 RX ADMIN — METOPROLOL TARTRATE 100 MG: 25 TABLET, FILM COATED ORAL at 05:50

## 2024-02-11 RX ADMIN — SENNOSIDES AND DOCUSATE SODIUM 2 TABLET: 50; 8.6 TABLET ORAL at 10:47

## 2024-02-11 RX ADMIN — METOPROLOL TARTRATE 100 MG: 25 TABLET, FILM COATED ORAL at 18:28

## 2024-02-11 ASSESSMENT — FIBROSIS 4 INDEX: FIB4 SCORE: 0.77

## 2024-02-11 NOTE — CARE PLAN
"  Problem: Fall Risk - Rehab  Goal: Patient will remain free from falls  2/11/2024 0111 by Rubi Kaye R.N.  Outcome: Progressing  2/10/2024 2327 by Rubi Kaye R.N.  Outcome: Progressing   Tiffani Hinton Fall risk Assessment Score: 13    Moderate fall risk Interventions  - Bed and strip alarm   - Yellow sign by the door   - Yellow wrist band \"Fall risk\"  - Room near to the nurse station  - Do not leave patient unattended in the bathroom  - Fall risk education provided             "

## 2024-02-11 NOTE — PROGRESS NOTES
NURSING DAILY NOTE    Name: Lorraine Leung   Date of Admission: 2/9/2024   Admitting Diagnosis: Acute ischemic left MCA stroke (HCC)  Attending Physician: Carlin Powers M.d.  Allergies: Patient has no allergy information on record.    Safety  Patient Assist  min asst  Patient Precautions  Fall Risk (No BP on R arm)  Precaution Comments  [place], pt wears headphones a  Bed Transfer Status  Moderate Assist  Toilet Transfer Status   Minimal Assist  Assistive Devices  Rails, Wheelchair  Oxygen  Silicone Nasal Cannula  Diet/Therapeutic Dining  Current Diet Order   Procedures    Diet Order Diet: Regular     Pill Administration  whole  Agitated Behavioral Scale     ABS Level of Severity       Fall Risk  Has the patient had a fall this admission?   No  Tiffani Hinton Fall Risk Scoring  13, MODERATE RISK  Fall Risk Safety Measures  bed alarm, chair alarm, poor balance, and low vision/ hearing    Vitals  Temperature: 36.8 °C (98.2 °F)  Temp src: Oral  Pulse: 62  Respiration: 18  Blood Pressure : 112/57  Blood Pressure MAP (Calculated): 75 MM HG  BP Location: Right, Upper Arm  Patient BP Position: Supine     Oxygen  Pulse Oximetry: 97 %  O2 (LPM): 3.5  O2 Delivery Device: Silicone Nasal Cannula    Bowel and Bladder  Last Bowel Movement  02/10/24  Stool Type     Bowel Device  Diaper  Continent  Bladder: Did not void   Bowel: No movement  Bladder Function  Urine Void (mL):  (Large)  Number of Times Voided: 2  Urine Color: Yellow  Genitourinary Assessment   Bladder Assessment (WDL):  WDL Except  Villela Catheter: Not Applicable  Urine Color: Yellow  Bladder Device: Bathroom  Time Void: No  Bladder Scan: Post Void  $ Bladder Scan Results (mL): 287    Skin  Selwyn Score   17  Sensory Interventions   Bed Types: Standard/Trauma Mattress  Skin Preventative Measures: Pillows in Use for Support / Positioning  Moisture Interventions  Moisturizers/Barriers: Barrier Wipes, Barrier  Paste      Pain  Pain Rating Scale  0 - No Pain  Pain Location  Back, Neck  Pain Location Orientation  Posterior  Pain Interventions   Declines    ADLs    Bathing      Linen Change      Personal Hygiene     Chlorhexidine Bath      Oral Care     Teeth/Dentures     Shave     Nutrition Percentage Eaten  Between 50-75% Consumed, Lunch  Environmental Precautions     Patient Turns/Positioning  Patient Turns Self from Side to Side  Patient Turns Assistance/Tolerance     Bed Positions  Bed Controls On  Head of Bed Elevated         Psychosocial/Neurologic Assessment  Psychosocial Assessment  Psychosocial (WDL):  Within Defined Limits  Neurologic Assessment  Neuro (WDL): Exceptions to WDL  Level of Consciousness: Alert  Orientation Level: Oriented X4  Cognition: Appropriate judgement, Follows commands  Speech: Clear  Pupil Assesment: Yes  R Pupil Size (mm): 3  R Pupil Shape / Description: Round  R Pupil Reaction: Brisk  L Pupil Size (mm): 3  L Pupil Shape / Description: Round  L Pupil Reaction: Brisk  RUE Motor Response: Responds to commands  RUE Sensation: Numbness, Tingling  Muscle Strength Right Arm: Fair Strength against Gravity but No Resistance  RLE Motor Response: Responds to commands  RLE Sensation: Numbness, Tingling  Muscle Strength Right Leg: Fair Strength against Gravity but No Resistance  EENT (WDL):  WDL Except    Cardio/Pulmonary Assessment  Edema      Respiratory Breath Sounds  RUL Breath Sounds: Clear  RML Breath Sounds: Crackles  RLL Breath Sounds: Crackles  KASEY Breath Sounds: Clear  LLL Breath Sounds: Crackles  Cardiac Assessment   Cardiac (WDL):  WDL Except

## 2024-02-11 NOTE — CARE PLAN
Problem: Knowledge Deficit - Standard  Goal: Patient and family/care givers will demonstrate understanding of plan of care, disease process/condition, diagnostic tests and medications  Outcome: Progressing   Pt education given regarding plan of care with emphasis on adequate hydration, pt shows moderate follow through and understanding, will continue to reinforce education and continue to monitor.         Problem: Fall Risk - Rehab  Goal: Patient will remain free from falls  Outcome: Progressing   Pt education given regarding fall precautions AND safety measures, pt shows good understanding, has not attempted to self transfer this shift, will continue to reinforce education and continue to monitor.

## 2024-02-11 NOTE — THERAPY
Speech Language Pathology  Daily Treatment     Patient Name: Lorraine Leung  Age:  78 y.o., Sex:  female  Medical Record #: [id number]  Today's Date: 2/11/2024     Precautions  Precautions: Fall Risk (No BP on R arm)  Comments: [place], pt wears headphones a    Subjective    Pt pleasant and cooperative during ST session. Dtr and son-in-law present and supportive.      Objective       02/11/24 1101   Treatment Charges   Charges Yes   SLP Swallowing Dysfunction Treatment Swallowing Dysfunction Treatment   SLP Cognitive Skill Development First 15 Minutes 1   SLP Cognitive Skill Development Additional 15 Minutes 1   SLP Total Time Spent   SLP Individual Total Time Spent (Mins) 60   Dysphagia    Diet / Liquid Recommendation Regular - Easy to Chew (7)   Outcome Measures   Outcome Measures Utilized SCCAN   SCCAN (Scales of Cognitive and Communicative Ability for Neurorehabilitation)   Oral Expression - Raw Score 18   Oral Expression - Scale Performance Score 95   Orientation - Raw Score 10   Orientation - Scale Performance Score 83   Memory - Raw Score 7   Memory - Scale Performance Score 37   Interdisciplinary Plan of Care Collaboration   IDT Collaboration with  Family / Caregiver   Patient Position at End of Therapy Seated;Chair Alarm On;Self Releasing Lap Belt Applied;Tray Table within Reach;Phone within Reach;Call Light within Reach   Collaboration Comments POC, CLOF         Assessment    Continued administration of SCCAN, pt presenting with increased processing time, especially for attention and numeric/mathematic tasks. Education provided to pt and family regarding attention deficits.     Pt tolerated level 7 regular textures and thin liquids with no overt clinical s/sx of aspiration. Pt expressed interest in starting Zantac 75 in addition to Prilosec as she was taking this premorbidly and helped to cover her acid reflux over night. Pt and family would also like for pt to get a flu shot.     Strengths: Able to follow  instructions, Effective communication skills, Independent prior level of function, Good insight into deficits/needs, Willingly participates in therapeutic activities, Pleasant and cooperative  Barriers: Impaired functional cognition, Hearing impairment    Plan    Assess diet tolerance for one more meal then D/C swallow tx, continue SCCAN.     Passport items to be completed:  Express basic needs, understand food/liquid recommendations, consistently follow swallow precautions, manage finances, manage medications, arrive to therapy appointments on time, complete daily memory log entries, solve problems related to safety situations, review education related to hospitalization, complete caregiver training     Speech Therapy Problems (Active)       Problem: Memory STGs       Dates: Start:  02/10/24         Goal: STG-Within one week, patient will       Dates: Start:  02/10/24       Description: 1) Individualized goal:  recall novel information related to rehabilitation using external and internal memory strategies with 80% acc and MIN A.   2) Interventions:  SLP Self Care / ADL Training , SLP Cognitive Skill Development, and SLP Group Treatment                Problem: Problem Solving STGs       Dates: Start:  02/10/24         Goal: STG-Within one week, patient will       Dates: Start:  02/10/24       Description: 1) Individualized goal:  complete the SCCAN with additional goals developed as appropriate.   2) Interventions:  SLP Self Care / ADL Training , SLP Cognitive Skill Development, and SLP Group Treatment                Problem: Speech/Swallowing LTGs       Dates: Start:  02/10/24         Goal: LTG-By discharge, patient will safely swallow       Dates: Start:  02/10/24       Description: 1) Individualized goal:  level 7 regular textures and thin liquids with no overt clinical s/sx of aspiration for a safe D/C to PLOF.  2) Interventions:  SLP Swallowing Dysfunction Treatment, SLP Self Care / ADL Training , and SLP  Group Treatment             Goal: LTG-By discharge, patient will solve complex problem       Dates: Start:  02/10/24       Description: 1) Individualized goal:  related to health/safety with 80% acc and MOD I for a safe D/C to PLOF.   2) Interventions:  SLP Self Care / ADL Training , SLP Cognitive Skill Development, and SLP Group Treatment                Problem: Swallowing STGs       Dates: Start:  02/10/24         Goal: STG-Within one week, patient will safely swallow       Dates: Start:  02/10/24       Description: 1) Individualized goal:  trials of level 7 regular textures and thin liquids with no overt clinical s/sx of aspiration for 2/2 meals w/ SLP.  2) Interventions:  SLP Swallowing Dysfunction Treatment, SLP Self Care / ADL Training , and SLP Group Treatment

## 2024-02-11 NOTE — CARE PLAN
Problem: Neurogenic Bladder  Goal: Patient will demonstrate self-cath technique using clean technique and care of the catheter  Outcome: Not Progressing   Patient is retaining urine.           Problem: Neurogenic Bowel  Goal: Patient will demonstrates ability to complete digital stimulation technique  Outcome: Not Progressing

## 2024-02-11 NOTE — CARE PLAN
NURSING DAILY NOTE    Name: Lorraine Leung   Date of Admission: 2/9/2024   Admitting Diagnosis: Acute ischemic left MCA stroke (HCC)  Attending Physician: Carlin Powers M.d.  Allergies: Patient has no allergy information on record.    Safety  Patient Assist  Min assist  Patient Precautions  Fall Risk (No BP on R arm)  Precaution Comments  [place], pt wears headphones a  Bed Transfer Status  Moderate Assist  Toilet Transfer Status   Minimal Assist  Assistive Devices  Rails, Wheelchair  Oxygen  Nasal Cannula  Diet/Therapeutic Dining  Current Diet Order   Procedures    Diet Order Diet: Regular     Pill Administration  whole  Agitated Behavioral Scale     ABS Level of Severity       Fall Risk  Has the patient had a fall this admission?   No  Tiffani Hinton Fall Risk Scoring  13, MODERATE RISK  Fall Risk Safety Measures  bed alarm and chair alarm    Vitals  Temperature: 37 °C (98.6 °F)  Temp src: Oral  Pulse: 65  Respiration: 20  Blood Pressure : 121/70  Blood Pressure MAP (Calculated): 87 MM HG  BP Location: Right, Upper Arm  Patient BP Position: Supine     Oxygen  Pulse Oximetry: 96 %  O2 (LPM): 3.5  O2 Delivery Device: Nasal Cannula    Bowel and Bladder  Last Bowel Movement  02/10/24  Stool Type     Bowel Device  Diaper  Continent  Bladder: Did not void   Bowel: No movement  Bladder Function  Urine Void (mL):  (large inc)  Number of Times Voided: 2  Urine Color: Yellow  Number of Times Incontinent of Urine: 0  Straight Catheter: 425 ml  Genitourinary Assessment   Bladder Assessment (WDL):  WDL Except  Villela Catheter: Not Applicable  Urine Color: Yellow  Number of Bladder Accidents: 0  Total Number of Bladder of Accidents in Last 7 Days: 0  Number of Times Incontinent of Urine: 0  Bladder Device:  (ICP.)  Time Void: Yes  Bladder Scan: Post Void  $ Bladder Scan Results (mL): 453    Skin  Selwyn Score   17  Sensory Interventions   Bed Types: Standard/Trauma  Mattress  Skin Preventative Measures: Pillows in Use to Float Heels, Pillows in Use for Support / Positioning  Moisture Interventions  Moisturizers/Barriers: Barrier Paste      Pain  Pain Rating Scale  0 - No Pain  Pain Location  Back, Neck  Pain Location Orientation  Posterior  Pain Interventions   Declines    ADLs    Bathing      Linen Change      Personal Hygiene  Perineal Care, Moist Octavia Wipes  Chlorhexidine Bath      Oral Care     Teeth/Dentures     Shave     Nutrition Percentage Eaten  Between 50-75% Consumed, Lunch  Environmental Precautions     Patient Turns/Positioning  Patient Turns Self from Side to Side  Patient Turns Assistance/Tolerance     Bed Positions  Bed Controls On  Head of Bed Elevated         Psychosocial/Neurologic Assessment  Psychosocial Assessment  Psychosocial (WDL):  Within Defined Limits  Neurologic Assessment  Neuro (WDL): Exceptions to WDL  Level of Consciousness: Alert  Orientation Level: Oriented X4  Cognition: Appropriate judgement, Follows commands  Speech: Clear  Pupil Assesment: Yes  R Pupil Size (mm): 3  R Pupil Shape / Description: Round  R Pupil Reaction: Brisk  L Pupil Size (mm): 3  L Pupil Shape / Description: Round  L Pupil Reaction: Brisk  RUE Motor Response: Responds to commands  RUE Sensation: Numbness, Tingling  Muscle Strength Right Arm: Fair Strength against Gravity but No Resistance  RLE Motor Response: Responds to commands  RLE Sensation: Numbness, Tingling  Muscle Strength Right Leg: Fair Strength against Gravity but No Resistance  EENT (WDL):  WDL Except    Cardio/Pulmonary Assessment  Edema      Respiratory Breath Sounds  RUL Breath Sounds: Clear  RML Breath Sounds: Crackles  RLL Breath Sounds: Crackles  KASEY Breath Sounds: Clear  LLL Breath Sounds: Crackles  Cardiac Assessment   Cardiac (WDL):  WDL Except

## 2024-02-11 NOTE — CARE PLAN
"  Problem: Pain - Standard  Goal: Alleviation of pain or a reduction in pain to the patient’s comfort goal  Outcome: Progressing  Patient is able to rate pain on a scale of 1-10.         Problem: Fall Risk - Rehab  Goal: Patient will remain free from falls  Outcome: Progressing   Tiffani Hinton Fall risk Assessment Score: 13      Moderate fall risk Interventions  - Bed and strip alarm   - Yellow sign by the door   - Yellow wrist band \"Fall risk\"  - Room near to the nurse station  - Do not leave patient unattended in the bathroom  - Fall risk education provided             "

## 2024-02-12 ENCOUNTER — APPOINTMENT (OUTPATIENT)
Dept: OCCUPATIONAL THERAPY | Facility: REHABILITATION | Age: 79
DRG: 056 | End: 2024-02-12
Attending: PHYSICAL MEDICINE & REHABILITATION
Payer: MEDICARE

## 2024-02-12 ENCOUNTER — APPOINTMENT (OUTPATIENT)
Dept: PHYSICAL THERAPY | Facility: REHABILITATION | Age: 79
DRG: 056 | End: 2024-02-12
Attending: PHYSICAL MEDICINE & REHABILITATION
Payer: MEDICARE

## 2024-02-12 ENCOUNTER — APPOINTMENT (OUTPATIENT)
Dept: SPEECH THERAPY | Facility: REHABILITATION | Age: 79
DRG: 056 | End: 2024-02-12
Attending: PHYSICAL MEDICINE & REHABILITATION
Payer: MEDICARE

## 2024-02-12 PROCEDURE — 97530 THERAPEUTIC ACTIVITIES: CPT

## 2024-02-12 PROCEDURE — 700111 HCHG RX REV CODE 636 W/ 250 OVERRIDE (IP): Mod: JZ | Performed by: PHYSICAL MEDICINE & REHABILITATION

## 2024-02-12 PROCEDURE — 97129 THER IVNTJ 1ST 15 MIN: CPT

## 2024-02-12 PROCEDURE — 770005 HCHG ROOM/CARE - REHAB PRIVATE (11*

## 2024-02-12 PROCEDURE — 97110 THERAPEUTIC EXERCISES: CPT

## 2024-02-12 PROCEDURE — 97530 THERAPEUTIC ACTIVITIES: CPT | Mod: CO

## 2024-02-12 PROCEDURE — 97130 THER IVNTJ EA ADDL 15 MIN: CPT

## 2024-02-12 PROCEDURE — 99232 SBSQ HOSP IP/OBS MODERATE 35: CPT | Performed by: PHYSICAL MEDICINE & REHABILITATION

## 2024-02-12 PROCEDURE — 700102 HCHG RX REV CODE 250 W/ 637 OVERRIDE(OP): Performed by: PHYSICAL MEDICINE & REHABILITATION

## 2024-02-12 PROCEDURE — 97602 WOUND(S) CARE NON-SELECTIVE: CPT

## 2024-02-12 PROCEDURE — A9270 NON-COVERED ITEM OR SERVICE: HCPCS | Performed by: PHYSICAL MEDICINE & REHABILITATION

## 2024-02-12 PROCEDURE — 94760 N-INVAS EAR/PLS OXIMETRY 1: CPT

## 2024-02-12 PROCEDURE — 97535 SELF CARE MNGMENT TRAINING: CPT | Mod: CO

## 2024-02-12 RX ORDER — FAMOTIDINE 20 MG/1
20 TABLET, FILM COATED ORAL EVERY EVENING
Status: DISCONTINUED | OUTPATIENT
Start: 2024-02-12 | End: 2024-02-15

## 2024-02-12 RX ADMIN — MONTELUKAST 10 MG: 10 TABLET, FILM COATED ORAL at 20:20

## 2024-02-12 RX ADMIN — OMEPRAZOLE 20 MG: 20 CAPSULE, DELAYED RELEASE ORAL at 08:25

## 2024-02-12 RX ADMIN — FAMOTIDINE 20 MG: 20 TABLET ORAL at 20:19

## 2024-02-12 RX ADMIN — GABAPENTIN 100 MG: 100 CAPSULE ORAL at 20:19

## 2024-02-12 RX ADMIN — HYDROXYZINE HYDROCHLORIDE 25 MG: 25 TABLET, FILM COATED ORAL at 18:04

## 2024-02-12 RX ADMIN — CYCLOBENZAPRINE 10 MG: 10 TABLET, FILM COATED ORAL at 08:25

## 2024-02-12 RX ADMIN — ATORVASTATIN CALCIUM 40 MG: 40 TABLET, FILM COATED ORAL at 20:20

## 2024-02-12 RX ADMIN — CLOPIDOGREL BISULFATE 75 MG: 75 TABLET ORAL at 08:25

## 2024-02-12 RX ADMIN — ASPIRIN 81 MG 81 MG: 81 TABLET ORAL at 08:25

## 2024-02-12 RX ADMIN — AMLODIPINE BESYLATE 10 MG: 5 TABLET ORAL at 05:30

## 2024-02-12 RX ADMIN — PAROXETINE HYDROCHLORIDE 40 MG: 20 TABLET, FILM COATED ORAL at 08:24

## 2024-02-12 RX ADMIN — SENNOSIDES AND DOCUSATE SODIUM 2 TABLET: 50; 8.6 TABLET ORAL at 08:24

## 2024-02-12 RX ADMIN — ENOXAPARIN SODIUM 40 MG: 100 INJECTION SUBCUTANEOUS at 18:02

## 2024-02-12 RX ADMIN — SENNOSIDES AND DOCUSATE SODIUM 2 TABLET: 50; 8.6 TABLET ORAL at 20:19

## 2024-02-12 RX ADMIN — METOPROLOL TARTRATE 100 MG: 25 TABLET, FILM COATED ORAL at 05:30

## 2024-02-12 RX ADMIN — LOSARTAN POTASSIUM 100 MG: 50 TABLET, FILM COATED ORAL at 05:30

## 2024-02-12 RX ADMIN — CYCLOBENZAPRINE 10 MG: 10 TABLET, FILM COATED ORAL at 20:20

## 2024-02-12 RX ADMIN — GABAPENTIN 100 MG: 100 CAPSULE ORAL at 08:25

## 2024-02-12 RX ADMIN — METOPROLOL TARTRATE 100 MG: 25 TABLET, FILM COATED ORAL at 18:01

## 2024-02-12 RX ADMIN — MECLIZINE HYDROCHLORIDE 12.5 MG: 25 TABLET ORAL at 08:24

## 2024-02-12 RX ADMIN — MECLIZINE HYDROCHLORIDE 12.5 MG: 25 TABLET ORAL at 20:20

## 2024-02-12 SDOH — ECONOMIC STABILITY: TRANSPORTATION INSECURITY
IN THE PAST 12 MONTHS, HAS THE LACK OF TRANSPORTATION KEPT YOU FROM MEDICAL APPOINTMENTS OR FROM GETTING MEDICATIONS?: NO

## 2024-02-12 SDOH — ECONOMIC STABILITY: TRANSPORTATION INSECURITY
IN THE PAST 12 MONTHS, HAS LACK OF RELIABLE TRANSPORTATION KEPT YOU FROM MEDICAL APPOINTMENTS, MEETINGS, WORK OR FROM GETTING THINGS NEEDED FOR DAILY LIVING?: NO

## 2024-02-12 ASSESSMENT — ACTIVITIES OF DAILY LIVING (ADL)
TOILETING_LEVEL_OF_ASSIST_DESCRIPTION: ASSIST TO PULL PANTS UP
BED_CHAIR_WHEELCHAIR_TRANSFER_DESCRIPTION: INCREASED TIME;VERBAL CUEING;SET-UP OF EQUIPMENT;SUPERVISION FOR SAFETY

## 2024-02-12 NOTE — IDT DISCHARGE PLANNING
CASE MANAGEMENT INITIAL ASSESSMENT    Admit Date:  2/9/2024     CM reviewed the medical chart and will meet with the patient to discuss role of case management / discharge planning / team conference.       Diagnosis: Acute ischemic left MCA stroke (HCC) [I63.512]    Co-morbidities:   Patient Active Problem List    Diagnosis Date Noted    Acute ischemic left MCA stroke (HCC) 02/09/2024     Prior Living Situation:  Housing / Facility: [address]  Lives with - Patient's Self Care Capacity: Alone and Able to Care For Self    Prior Level of Function:  Medication Management: Independent  Finances: Independent  Home Management: Independent  Shopping: Independent  Prior Level Of Mobility: Independent With Device in Community, Independent With Steps in Community, Independent Without Device in Home, Independent With Steps in Home (No device in home.  SPC in community.  Daughter had just purchased a 4WW - Pt reported she had not used it yet.)  Driving / Transportation: Driving Independent    Support Systems:  Primary : Torri Breen         Previous Services Utilized:   Equipment Owned: Single Point Cane, 4-Wheel Walker, Oxygen  Prior Services: Continuous (24 Hour) Care Giving Per Service (Transferred from SNF)    Other Information:  Occupation (Pre-Hospital Vocational): Retired Due To Age     Primary Payor Source: Medicare A, Medicare B    Patient / Family Goal:  Patient / Family Goal: Return home.    Plan:  1. Continue to follow patient through hospitalization and provide discharge planning in collaboration with patient, family, physicians and ancillary services.     2. Utilize community resources to ensure a safe discharge.

## 2024-02-12 NOTE — FLOWSHEET NOTE
02/11/24 1420   Events/Summary/Plan   Events/Summary/Plan spot check done pt doing well remains on 3.5 liters   Vital Signs   Pulse 68   Respiration 18   Pulse Oximetry 98 %   $ Pulse Oximetry (Spot Check) Yes   Respiratory Assessment   Respiratory Pattern Within Normal Limits   Level of Consciousness Alert   Chest Exam   Work Of Breathing / Effort Within Normal Limits   Oxygen   O2 (LPM) 3.5   O2 Delivery Device Nasal Cannula

## 2024-02-12 NOTE — THERAPY
Speech Language Pathology  Daily Treatment     Patient Name: Lorraine Leung  Age:  78 y.o., Sex:  female  Medical Record #: [id number]  Today's Date: 2/12/2024     Precautions  Precautions: Fall Risk  Comments: (P) [place], pt wears hearing amplifier, 3.5L O2 at baseline    Subjective    Patient seen at 10:30 and 1330 for a total of 90 minutes skilled ST services. Patient using hearing amplifier as she reports she was in the process of obtaining new hearing aids. Patient continues to require repetitions of information due to [place].       Objective       02/12/24 1334   Treatment Charges   SLP Cognitive Skill Development First 15 Minutes 1   SLP Cognitive Skill Development Additional 15 Minutes 5   SLP Total Time Spent   SLP Individual Total Time Spent (Mins) 90   Precautions   Comments [place], pt wears hearing amplifier, 3.5L O2 at baseline   Cognition   Cognitive-Linguistic (WDL) X   Functional Math / Financial Management Moderate (3)   SCCAN (Scales of Cognitive and Communicative Ability for Neurorehabilitation)   Oral Expression - Raw Score 18   Oral Expression - Scale Performance Score 95   Orientation - Raw Score 10   Orientation - Scale Performance Score 83   Memory - Raw Score 7   Memory - Scale Performance Score 37   Speech Comprehension - Raw Score 12   Speech Comprehension - Scale Performance Score 92   Reading Comprehension - Raw Score 9   Reading Comprehension - Scale Performance Score 75   Writing - Raw Score 5   Writing - Scale Performance Score 71   Attention - Raw Score 8   Attention - Scale Performance Score 50   Problem Solving - Raw Score 13   Problem Solving - Scale Performance Score 57   SCCAN Total Raw Score 66   SCCAN Degree of Severity Moderate Impairment   Interdisciplinary Plan of Care Collaboration   IDT Collaboration with  Certified Nursing Assistant   Patient Position at End of Therapy Seated;Self Releasing Lap Belt Applied;Tray Table within Reach;Call Light within Reach;Chair Alarm On    Collaboration Comments CNA switching O2 to tank for lunch         Assessment    Outcome assessment completed with scores reported as follows.  SCCAN (Scales of Cognitive and Communicative Ability for Neurorehabilitation)  Oral Expression - Raw Score: (P) 18  Oral Expression - Scale Performance Score: (P) 95  Orientation - Raw Score: (P) 10  Orientation - Scale Performance Score: (P) 83  Memory - Raw Score: (P) 7  Memory - Scale Performance Score: (P) 37  Speech Comprehension - Raw Score: (P) 12  Speech Comprehension - Scale Performance Score: (P) 92  Reading Comprehension - Raw Score: (P) 9  Reading Comprehension - Scale Performance Score: (P) 75  Writing - Raw Score: (P) 5  Writing - Scale Performance Score: (P) 71  Attention - Raw Score: (P) 8  Attention - Scale Performance Score: (P) 50  Problem Solving - Raw Score: (P) 13  Problem Solving - Scale Performance Score: (P) 57  SCCAN Total Raw Score: (P) 66  SCCAN Degree of Severity: (P) Moderate Impairment    Patient presents with overall moderate impairments with severe deficits noted in the areas of memory, attention, and problem-solving. Patient reports independent prior level of function with all IADLs.     Mock financial management tasks who has more coins only attempted. Given two amounts (i.e. Two times, 2 quarters) patient achieved 85% accuracy. When increased to three amounts (i.e. two dimes, four nickels, six pennies) patient required max assist to calculate total. Patient frequently, jumping around the page, cued to chunk information into smaller units, and then sum, however, patient continues to demonstrate difficulty adding three amounts together.      Strengths: Able to follow instructions, Effective communication skills, Independent prior level of function, Good insight into deficits/needs, Willingly participates in therapeutic activities, Pleasant and cooperative  Barriers: Impaired functional cognition, Hearing impairment    Plan    Continue to  target, simple money math, attention and memory.      Passport items to be completed:  Express basic needs, understand food/liquid recommendations, consistently follow swallow precautions, manage finances, manage medications, arrive to therapy appointments on time, complete daily memory log entries, solve problems related to safety situations, review education related to hospitalization, complete caregiver training     Speech Therapy Problems (Active)       Problem: Memory STGs       Dates: Start:  02/10/24         Goal: STG-Within one week, patient will       Dates: Start:  02/10/24       Description: 1) Individualized goal:  recall novel information related to rehabilitation using external and internal memory strategies with 80% acc and MIN A.   2) Interventions:  SLP Self Care / ADL Training , SLP Cognitive Skill Development, and SLP Group Treatment                Problem: Problem Solving STGs       Dates: Start:  02/10/24         Goal: STG-Within one week, patient will       Dates: Start:  02/10/24       Description: 1) Individualized goal:  complete the SCCAN with additional goals developed as appropriate.   2) Interventions:  SLP Self Care / ADL Training , SLP Cognitive Skill Development, and SLP Group Treatment                Problem: Speech/Swallowing LTGs       Dates: Start:  02/10/24         Goal: LTG-By discharge, patient will safely swallow       Dates: Start:  02/10/24       Description: 1) Individualized goal:  level 7 regular textures and thin liquids with no overt clinical s/sx of aspiration for a safe D/C to PLOF.  2) Interventions:  SLP Swallowing Dysfunction Treatment, SLP Self Care / ADL Training , and SLP Group Treatment             Goal: LTG-By discharge, patient will solve complex problem       Dates: Start:  02/10/24       Description: 1) Individualized goal:  related to health/safety with 80% acc and MOD I for a safe D/C to PLOF.   2) Interventions:  SLP Self Care / ADL Training , SLP  Cognitive Skill Development, and SLP Group Treatment                Problem: Swallowing STGs       Dates: Start:  02/10/24         Goal: STG-Within one week, patient will safely swallow       Dates: Start:  02/10/24       Description: 1) Individualized goal:  trials of level 7 regular textures and thin liquids with no overt clinical s/sx of aspiration for 2/2 meals w/ SLP.  2) Interventions:  SLP Swallowing Dysfunction Treatment, SLP Self Care / ADL Training , and SLP Group Treatment

## 2024-02-12 NOTE — PROGRESS NOTES
NURSING DAILY NOTE    Name: Lorraine Leung   Date of Admission: 2/9/2024   Admitting Diagnosis: Acute ischemic left MCA stroke (HCC)  Attending Physician: Carlin Powers M.d.  Allergies: Patient has no allergy information on record.    Safety  Patient Assist  Min assist  Patient Precautions  Fall Risk (No BP on R arm)  Precaution Comments  [place], pt wears headphones a  Bed Transfer Status  Moderate Assist  Toilet Transfer Status   Minimal Assist  Assistive Devices  Rails, Wheelchair  Oxygen  Nasal Cannula  Diet/Therapeutic Dining  Current Diet Order   Procedures    Diet Order Diet: Regular     Pill Administration  whole  Agitated Behavioral Scale     ABS Level of Severity       Fall Risk  Has the patient had a fall this admission?   No  Tiffani Hinton Fall Risk Scoring  13, MODERATE RISK  Fall Risk Safety Measures  bed alarm, poor balance, and low vision/ hearing    Vitals  Temperature: 36.7 °C (98 °F)  Temp src: Oral  Pulse: 69  Respiration: 16  Blood Pressure : 104/69  Blood Pressure MAP (Calculated): 81 MM HG  BP Location: Right, Upper Arm  Patient BP Position: Supine     Oxygen  Pulse Oximetry: 99 %  O2 (LPM): 3.5  O2 Delivery Device: Nasal Cannula    Bowel and Bladder  Last Bowel Movement  02/10/24  Stool Type     Bowel Device  Diaper  Continent  Bladder: Did not void   Bowel: No movement  Bladder Function  Urine Void (mL):  (large inc)  Number of Times Voided: 1  Urine Color: Yellow  Number of Times Incontinent of Urine: 0  Straight Catheter: 425 ml  Genitourinary Assessment   Bladder Assessment (WDL):  WDL Except  Villela Catheter: Not Applicable  Urine Color: Yellow  Number of Bladder Accidents: 0  Total Number of Bladder of Accidents in Last 7 Days: 0  Number of Times Incontinent of Urine: 0  Bladder Device:  (ICP.)  Time Void: Yes  Bladder Scan: Post Void  $ Bladder Scan Results (mL): 151    Skin  Selwyn Score   17  Sensory Interventions   Bed Types:  Standard/Trauma Mattress  Skin Preventative Measures: Pillows in Use for Support / Positioning  Moisture Interventions  Moisturizers/Barriers: Barrier Cream      Pain  Pain Rating Scale  0 - No Pain  Pain Location  Back, Neck  Pain Location Orientation  Posterior  Pain Interventions   Declines    ADLs    Bathing      Linen Change      Personal Hygiene  Perineal Care, Moist Octavia Wipes  Chlorhexidine Bath      Oral Care     Teeth/Dentures     Shave     Nutrition Percentage Eaten  *  * Meal *  *, Lunch, Between 50-75% Consumed  Environmental Precautions     Patient Turns/Positioning  Patient Turns Self from Side to Side  Patient Turns Assistance/Tolerance     Bed Positions  Bed Controls On  Head of Bed Elevated         Psychosocial/Neurologic Assessment  Psychosocial Assessment  Psychosocial (WDL):  Within Defined Limits  Neurologic Assessment  Neuro (WDL): Exceptions to WDL  Level of Consciousness: Alert  Orientation Level: Oriented X4  Cognition: Appropriate judgement, Follows commands  Speech: Clear  Pupil Assesment: Yes  R Pupil Size (mm): 3  R Pupil Shape / Description: Round  R Pupil Reaction: Brisk  L Pupil Size (mm): 3  L Pupil Shape / Description: Round  L Pupil Reaction: Brisk  RUE Motor Response: Responds to commands  RUE Sensation: Numbness, Tingling  Muscle Strength Right Arm: Fair Strength against Gravity but No Resistance  RLE Motor Response: Responds to commands  RLE Sensation: Numbness, Tingling  Muscle Strength Right Leg: Fair Strength against Gravity but No Resistance  EENT (WDL):  WDL Except    Cardio/Pulmonary Assessment  Edema      Respiratory Breath Sounds  RUL Breath Sounds: Clear  RML Breath Sounds: Crackles  RLL Breath Sounds: Crackles  KASEY Breath Sounds: Clear  LLL Breath Sounds: Crackles  Cardiac Assessment   Cardiac (WDL):  WDL Except

## 2024-02-12 NOTE — PROGRESS NOTES
"  Physical Medicine & Rehabilitation Progress Note    Encounter Date: 2/12/2024    Chief Complaint: Weakness    Interval Events (Subjective):  Seen in therapy. Ultrasound negative. Ziopatch on. Spoke about not being on anticoagulation.  Start zantac at night for overnight reflux.    Objective:  VITAL SIGNS: /65   Pulse 65   Temp 36.8 °C (98.3 °F) (Temporal)   Resp 17   Ht 1.676 m (5' 6\")   Wt 63 kg (138 lb 14.2 oz)   SpO2 99%   BMI 22.42 kg/m²   Gen: No acute distress, well developed well nourished adult  HEENT: Normal Cephalic Atraumatic, Normal conjunctiva.   CV: warm extremities, well perfused, no edema  Resp: symmetric chest rise, breathing comfortably on room air  Abd: Soft, Non distended  Extremities: normal bulk, no atrophy  Skin: no visible rashes or lesions.   Neuro: alert, awake  Psych: Mood and affect appropriate and congruent    Laboratory Values:  Recent Results (from the past 72 hour(s))   CBC with Differential    Collection Time: 02/10/24  5:39 AM   Result Value Ref Range    WBC 6.8 4.8 - 10.8 K/uL    RBC 3.46 (L) 4.20 - 5.40 M/uL    Hemoglobin 10.7 (L) 12.0 - 16.0 g/dL    Hematocrit 33.0 (L) 37.0 - 47.0 %    MCV 95.4 81.4 - 97.8 fL    MCH 30.9 27.0 - 33.0 pg    MCHC 32.4 32.2 - 35.5 g/dL    RDW 41.9 35.9 - 50.0 fL    Platelet Count 303 164 - 446 K/uL    MPV 9.6 9.0 - 12.9 fL    Neutrophils-Polys 64.30 44.00 - 72.00 %    Lymphocytes 23.00 22.00 - 41.00 %    Monocytes 5.60 0.00 - 13.40 %    Eosinophils 6.10 0.00 - 6.90 %    Basophils 0.70 0.00 - 1.80 %    Immature Granulocytes 0.30 0.00 - 0.90 %    Nucleated RBC 0.00 0.00 - 0.20 /100 WBC    Neutrophils (Absolute) 4.39 1.82 - 7.42 K/uL    Lymphs (Absolute) 1.57 1.00 - 4.80 K/uL    Monos (Absolute) 0.38 0.00 - 0.85 K/uL    Eos (Absolute) 0.42 0.00 - 0.51 K/uL    Baso (Absolute) 0.05 0.00 - 0.12 K/uL    Immature Granulocytes (abs) 0.02 0.00 - 0.11 K/uL    NRBC (Absolute) 0.00 K/uL   Comp Metabolic Panel (CMP)    Collection Time: 02/10/24  " 5:39 AM   Result Value Ref Range    Sodium 141 135 - 145 mmol/L    Potassium 4.1 3.6 - 5.5 mmol/L    Chloride 106 96 - 112 mmol/L    Co2 26 20 - 33 mmol/L    Anion Gap 9.0 7.0 - 16.0    Glucose 97 65 - 99 mg/dL    Bun 8 8 - 22 mg/dL    Creatinine 0.51 0.50 - 1.40 mg/dL    Calcium 8.1 (L) 8.5 - 10.5 mg/dL    Correct Calcium 8.8 8.5 - 10.5 mg/dL    AST(SGOT) 10 (L) 12 - 45 U/L    ALT(SGPT) 11 2 - 50 U/L    Alkaline Phosphatase 144 (H) 30 - 99 U/L    Total Bilirubin 0.2 0.1 - 1.5 mg/dL    Albumin 3.1 (L) 3.2 - 4.9 g/dL    Total Protein 6.1 6.0 - 8.2 g/dL    Globulin 3.0 1.9 - 3.5 g/dL    A-G Ratio 1.0 g/dL   HEMOGLOBIN A1C    Collection Time: 02/10/24  5:39 AM   Result Value Ref Range    Glycohemoglobin 5.8 (H) 4.0 - 5.6 %    Est Avg Glucose 120 mg/dL   Magnesium    Collection Time: 02/10/24  5:39 AM   Result Value Ref Range    Magnesium 2.0 1.5 - 2.5 mg/dL   TSH with Reflex to FT4    Collection Time: 02/10/24  5:39 AM   Result Value Ref Range    TSH 1.620 0.380 - 5.330 uIU/mL   ESTIMATED GFR    Collection Time: 02/10/24  5:39 AM   Result Value Ref Range    GFR (CKD-EPI) 96 >60 mL/min/1.73 m 2       Medications:  Scheduled Medications   Medication Dose Frequency    amLODIPine  10 mg Q DAY    aspirin  81 mg DAILY    atorvastatin  40 mg Q EVENING    clopidogrel  75 mg DAILY    cyclobenzaprine  10 mg BID    gabapentin  100 mg BID    losartan  100 mg Q DAY    metoprolol tartrate  100 mg BID    montelukast  10 mg Nightly    PARoxetine  40 mg DAILY    Pharmacy Consult Request  1 Each PHARMACY TO DOSE    senna-docusate  2 Tablet BID    omeprazole  20 mg DAILY    meclizine  12.5 mg BID    enoxaparin (LOVENOX) injection  40 mg DAILY AT 1800     PRN medications: acetaminophen, Respiratory Therapy Consult, hydrALAZINE, senna-docusate **AND** polyethylene glycol/lytes, ondansetron **OR** ondansetron, traZODone, sodium chloride, meclizine, hydrOXYzine HCl    Diet:  Current Diet Order   Procedures    Diet Order Diet: Regular        Medical Decision Making and Plan:  CVA:  - Patient with L MCA stroke on 1/6. Etiology likely Unclear . Received NO intervention  - Continue ASA and statin for secondary stroke prophylaxis.  -Zio patch     Factor V leiden  -has not been on anticoagulation for 10 years  -bilateral  US to rule out blood clots negative on 2/11     Vertigo  -PT to test BPPV  -Meclizine 12.5mg BID     CAD  -Pavix     COPD  -on 3l at home, currently on 3.5L  -Montelucast     HTN  -Norvasc 10mg daily  Losartan 100mg daily  Lopressor 100mg BID     Lumbar back pain  Flexeril and gabapentin     Cognitive Communicative deficits:  - Patient with significant cognitive deficits due to stroke  - Environmental modifications to decrease excessive stimulation including turning off the lights  - Consider starting neurostimulants such Nuvigil, amantadine or methylphenidate  - SLP to evaluate and treat cognitive deficits.   -Neuropsych will follow and perform testing if/when appropriate.      Hemiparesis:  - Patient with Rdom hemiparesis  - consider starting SSRI per FLAME trial to facilitate post-stroke motor recovery  - Continue aggressive therapies with PT and OT to strengthen and optimize function.     Spasticity:  - Patient at risk ofincreased tone in the affected limbs/muscles  - PT and OT to initiate aggressive stretching, ROM exercises, bracing, splinting, casting and positioning as appropriate.  - If the tone fails to improve with the above conservative measures, consider further intervention with botulinum toxin or phenol injections.     Dysphagia/Nutrition:  - Patient currently on Regular DIET.  - Continue therapies with SLP. SLP to perform swallow evaluation and provide oral motor exercises if necessary.      Neurogenic bladder:  - Timed voids with PVR q4H x3. If PVR > 400mL or if patient is unable to void, straight cath patient.     Neurogenic bowel:  -  Colace, Senna BID on admission  - Goal of 1BM/day.     Circadian Rhythm disorder:    Recommend lights on during the day/off at night, minimize nighttime interruptions as able.     Mood  Anxiety  -Paxil  -atarax PRN     - at risk of adjustment disorder, depression, and anxiety due to functional decline     ID:  - at risk for Urinary tract infection     Skin/Wounds:  - Pressure relief q2h while in bed. Close monitoring for signs of breakdown     Pain:  - Neuroceptic - On Tylenol prn     DVT prophylaxis: continue lovenox     GI prophylaxis:  On Prilosec 20mg daily, added Pepcid 20mg QHS for Reflux at night      -Follow-up Stroke bridge, heme    ____________________________________    Carlin Powers MD  Physical Medicine & Rehabilitation   Brain Injury Medicine   ____________________________________

## 2024-02-12 NOTE — PROGRESS NOTES
Placed ziopatch on patient to left upper chest per MD order. Grey box  is in purple bag behind patient's WC.

## 2024-02-12 NOTE — PROGRESS NOTES
..                                                         NURSING DAILY NOTE    Name: Lorraine Leung   Date of Admission: 2/9/2024   Admitting Diagnosis: Acute ischemic left MCA stroke (HCC)  Attending Physician: Carlin Powers M.d.  Allergies: Patient has no allergy information on record.    Safety  Patient Assist  Min assist  Patient Precautions  Fall Risk (No BP on R arm)  Precaution Comments  [place], pt wears headphones a  Bed Transfer Status  Moderate Assist  Toilet Transfer Status   Minimal Assist  Assistive Devices  Rails, Wheelchair  Oxygen  Nasal Cannula  Diet/Therapeutic Dining  Current Diet Order   Procedures    Diet Order Diet: Regular     Pill Administration  whole  Agitated Behavioral Scale     ABS Level of Severity       Fall Risk  Has the patient had a fall this admission?   No  Tiffani Hinton Fall Risk Scoring  18, HIGH RISK  Fall Risk Safety Measures  bed alarm, chair alarm, seatbelt alarm, poor balance, and low vision/ hearing    Vitals  Temperature: 36.7 °C (98 °F)  Temp src: Oral  Pulse: 69  Respiration: 16  Blood Pressure : 104/69  Blood Pressure MAP (Calculated): 81 MM HG  BP Location: Right, Upper Arm  Patient BP Position: Supine     Oxygen  Pulse Oximetry: 99 %  O2 (LPM): 3.5  O2 Delivery Device: Nasal Cannula    Bowel and Bladder  Last Bowel Movement  02/10/24  Stool Type     Bowel Device  Diaper  Continent  Bladder: Did not void   Bowel: No movement  Bladder Function  Urine Void (mL):  (Large)  Number of Times Voided: 1  Urine Color: Yellow  Number of Times Incontinent of Urine: 0  Straight Catheter: 425 ml  Genitourinary Assessment   Bladder Assessment (WDL):  WDL Except  [place] Catheter: Not Applicable  Urine Color: Yellow  Number of Bladder Accidents: 0  Total Number of Bladder of Accidents in Last 7 Days: 0  Number of Times Incontinent of Urine: 0  Bladder Device:  (ICP.)  Time Void: Yes  Bladder Scan: Post Void  $ Bladder Scan Results (mL): 71    Skin  Selwyn Score   18  Sensory  Interventions   Bed Types: Standard/Trauma Mattress  Skin Preventative Measures: Pillows in Use for Support / Positioning  Moisture Interventions  Moisturizers/Barriers: Barrier Paste      Pain  Pain Rating Scale  2 - Notice Pain, does not interfere with activities  Pain Location  Head  Pain Location Orientation  Posterior  Pain Interventions   Medication (see MAR)    ADLs    Bathing      Linen Change      Personal Hygiene  Perineal Care, Moist Octavia Wipes  Chlorhexidine Bath      Oral Care     Teeth/Dentures     Shave     Nutrition Percentage Eaten  *  * Meal *  *, Lunch, Between 50-75% Consumed  Environmental Precautions  Treaded Slipper Socks on Patient, Bed in Low Position  Patient Turns/Positioning  Patient Turns Self from Side to Side  Patient Turns Assistance/Tolerance     Bed Positions  Bed Controls On, Bed Locked  Head of Bed Elevated  Less than 30 degrees      Psychosocial/Neurologic Assessment  Psychosocial Assessment  Psychosocial (WDL):  Within Defined Limits  Neurologic Assessment  Neuro (WDL): Exceptions to WDL (neuropathy feet and hands)  Level of Consciousness: Alert  Orientation Level: Oriented X4  Cognition: Appropriate judgement, Follows commands  Speech: Clear  Pupil Assesment: Yes  R Pupil Size (mm): 3  R Pupil Shape / Description: Round  R Pupil Reaction: Brisk  L Pupil Size (mm): 3  L Pupil Shape / Description: Round  L Pupil Reaction: Brisk  RUE Motor Response: Responds to commands  RUE Sensation: Numbness, Tingling  Muscle Strength Right Arm: Good Strength Against Gravity and Moderate Resistance  RLE Motor Response: Responds to commands  RLE Sensation: Numbness, Tingling  Muscle Strength Right Leg: Good Strength Against Gravity and Moderate Resistance  EENT (WDL):  WDL Except    Cardio/Pulmonary Assessment  Edema      Respiratory Breath Sounds  RUL Breath Sounds: Clear  RML Breath Sounds: Diminished  RLL Breath Sounds: Diminished  KASEY Breath Sounds: Clear  LLL Breath Sounds:  Diminished  Cardiac Assessment   Cardiac (WDL):  WDL Except

## 2024-02-12 NOTE — CARE PLAN
"The patient is Stable - Low risk of patient condition declining or worsening    Shift Goals  Clinical Goals: Safety  Patient Goals: Participate in therapy    Patient is not progressing towards the following goals:    Problem: Fall Risk - Rehab  Goal: Patient will remain free from falls  Outcome: Not Met  Note: Tiffani Hinton Fall risk Assessment Score: 18    High fall risk Interventions   - Alarming seatbelt  - Bed and strip alarm   - Yellow sign by the door   - Yellow wrist band \"Fall risk\"  - Room near to the nurse station  - Do not leave patient unattended in the bathroom  - Fall risk education provided     "

## 2024-02-12 NOTE — THERAPY
"Occupational Therapy  Daily Treatment     Patient Name: Lorraine Leung  Age:  78 y.o., Sex:  female  Medical Record #: [id number]  Today's Date: 2/12/2024     Precautions  Precautions: Fall Risk  Comments: harshil GAYTAN wears headphones a         Subjective    \" I think maybe I did   \" when asked if she had a urinary accident      Objective       02/12/24 0931   OT Charge Group   OT Self Care / ADL (Units) 2   OT Therapy Activity (Units) 2   OT Total Time Spent   OT Individual Total Time Spent (Mins) 60   Precautions   Precautions Fall Risk   Comments [place] wears headphones a   Functional Level of Assist   Grooming Supervision  (cues to locate items to the right)   Lower Body Dressing Moderate Assist  (to don pants and  tread socks)   Toileting Moderate Assist   Toileting Description Assist to pull pants up   Toilet Transfers Minimal Assist   Sitting Upper Body Exercises   Upper Extremity Bike Level 2 Resistance  (x 5 minutes   motomed)   Balance   Standing Balance (Static) Fair   Comments static standing ring toss in // bars    with CGA   one hand on bar for steady assist      looses balance forward   Interdisciplinary Plan of Care Collaboration   IDT Collaboration with  Nursing   Patient Position at End of Therapy Seated;Call Light within Reach;Tray Table within Reach;Chair Alarm On   Collaboration Comments therapist changed tread socks to XL  yellow as patient wearing ill fitting ( Way to big)  XXl socks    also removed  XXXL  socks from froom     ill fitting socks an result in a fall .         Assessment     Participates to the best of her ability  motivated to make functional gains.      Limited by decreased balance  right hemiparesis and inattention       decreased strength/endurance     Strengths: Alert and oriented, Independent prior level of function, Motivated for self care and independence, Pleasant and cooperative, Supportive family, Willingly participates in therapeutic activities  Barriers: Hearing " impairment, Decreased endurance, Generalized weakness, Hemiparesis, Impaired balance, Limited mobility    Plan     ADL IADL   related mobility and transfers   balance activity standing tolerance / endurance  strength/endurance building       DME           Occupational Therapy Goals (Active)       Problem: Dressing       Dates: Start:  02/10/24         Goal: STG-Within one week, patient will dress UB at Mod Indep       Dates: Start:  02/10/24            Goal: STG-Within one week, patient will dress LB at Sup via AE/DME PRN       Dates: Start:  02/10/24               Problem: Functional Transfers       Dates: Start:  02/10/24         Goal: STG-Within one week, patient will transfer to toilet at Sup via DME       Dates: Start:  02/10/24               Problem: IADL's       Dates: Start:  02/10/24         Goal: STG-Within one week, patient will prepare a simple meal at Min assist via AE/DME PRN.       Dates: Start:  02/10/24               Problem: OT Long Term Goals       Dates: Start:  02/10/24         Goal: LTG-By discharge, patient will complete basic self care tasks at Sup to Mod Indep via AE/DME PRN.       Dates: Start:  02/10/24            Goal: LTG-By discharge, patient will perform bathroom transfers at Sup via DME.       Dates: Start:  02/10/24               Problem: Toileting       Dates: Start:  02/10/24         Goal: STG-Within one week, patient will complete toileting tasks at Sup via AE/DME PRN - including management of disposable briefs and hygiene.       Dates: Start:  02/10/24

## 2024-02-13 ENCOUNTER — HOSPITAL ENCOUNTER (EMERGENCY)
Facility: MEDICAL CENTER | Age: 79
End: 2024-02-14
Attending: EMERGENCY MEDICINE
Payer: MEDICARE

## 2024-02-13 ENCOUNTER — NON-PROVIDER VISIT (OUTPATIENT)
Dept: CARDIOLOGY | Facility: MEDICAL CENTER | Age: 79
End: 2024-02-13
Payer: MEDICARE

## 2024-02-13 ENCOUNTER — APPOINTMENT (OUTPATIENT)
Dept: OCCUPATIONAL THERAPY | Facility: REHABILITATION | Age: 79
End: 2024-02-13
Attending: PHYSICAL MEDICINE & REHABILITATION
Payer: MEDICARE

## 2024-02-13 ENCOUNTER — APPOINTMENT (OUTPATIENT)
Dept: SPEECH THERAPY | Facility: REHABILITATION | Age: 79
End: 2024-02-13
Attending: PHYSICAL MEDICINE & REHABILITATION
Payer: MEDICARE

## 2024-02-13 ENCOUNTER — APPOINTMENT (OUTPATIENT)
Dept: PHYSICAL THERAPY | Facility: REHABILITATION | Age: 79
End: 2024-02-13
Attending: PHYSICAL MEDICINE & REHABILITATION
Payer: MEDICARE

## 2024-02-13 ENCOUNTER — APPOINTMENT (OUTPATIENT)
Dept: RADIOLOGY | Facility: MEDICAL CENTER | Age: 79
End: 2024-02-13
Attending: PHYSICAL MEDICINE & REHABILITATION
Payer: MEDICARE

## 2024-02-13 DIAGNOSIS — I47.10 SVT (SUPRAVENTRICULAR TACHYCARDIA) (HCC): ICD-10-CM

## 2024-02-13 DIAGNOSIS — R45.1 AGITATION: ICD-10-CM

## 2024-02-13 DIAGNOSIS — I47.29 NSVT (NONSUSTAINED VENTRICULAR TACHYCARDIA) (HCC): ICD-10-CM

## 2024-02-13 LAB
ALBUMIN SERPL BCP-MCNC: 3.2 G/DL (ref 3.2–4.9)
ALBUMIN/GLOB SERPL: 1 G/DL
ALP SERPL-CCNC: 118 U/L (ref 30–99)
ALT SERPL-CCNC: 11 U/L (ref 2–50)
ANION GAP SERPL CALC-SCNC: 10 MMOL/L (ref 7–16)
ANION GAP SERPL CALC-SCNC: 12 MMOL/L (ref 7–16)
AST SERPL-CCNC: 15 U/L (ref 12–45)
BASOPHILS # BLD AUTO: 0.8 % (ref 0–1.8)
BASOPHILS # BLD: 0.06 K/UL (ref 0–0.12)
BILIRUB SERPL-MCNC: 0.3 MG/DL (ref 0.1–1.5)
BUN SERPL-MCNC: 16 MG/DL (ref 8–22)
BUN SERPL-MCNC: 21 MG/DL (ref 8–22)
CALCIUM ALBUM COR SERPL-MCNC: 8.9 MG/DL (ref 8.5–10.5)
CALCIUM SERPL-MCNC: 8.3 MG/DL (ref 8.5–10.5)
CALCIUM SERPL-MCNC: 8.6 MG/DL (ref 8.5–10.5)
CHLORIDE SERPL-SCNC: 104 MMOL/L (ref 96–112)
CHLORIDE SERPL-SCNC: 104 MMOL/L (ref 96–112)
CO2 SERPL-SCNC: 24 MMOL/L (ref 20–33)
CO2 SERPL-SCNC: 26 MMOL/L (ref 20–33)
CREAT SERPL-MCNC: 0.69 MG/DL (ref 0.5–1.4)
CREAT SERPL-MCNC: 0.89 MG/DL (ref 0.5–1.4)
EOSINOPHIL # BLD AUTO: 0.56 K/UL (ref 0–0.51)
EOSINOPHIL NFR BLD: 7.1 % (ref 0–6.9)
ERYTHROCYTE [DISTWIDTH] IN BLOOD BY AUTOMATED COUNT: 41.4 FL (ref 35.9–50)
ERYTHROCYTE [DISTWIDTH] IN BLOOD BY AUTOMATED COUNT: 41.4 FL (ref 35.9–50)
GFR SERPLBLD CREATININE-BSD FMLA CKD-EPI: 67 ML/MIN/1.73 M 2
GFR SERPLBLD CREATININE-BSD FMLA CKD-EPI: 89 ML/MIN/1.73 M 2
GLOBULIN SER CALC-MCNC: 3.2 G/DL (ref 1.9–3.5)
GLUCOSE SERPL-MCNC: 89 MG/DL (ref 65–99)
GLUCOSE SERPL-MCNC: 98 MG/DL (ref 65–99)
HCT VFR BLD AUTO: 32.8 % (ref 37–47)
HCT VFR BLD AUTO: 35.2 % (ref 37–47)
HGB BLD-MCNC: 10.7 G/DL (ref 12–16)
HGB BLD-MCNC: 11.6 G/DL (ref 12–16)
IMM GRANULOCYTES # BLD AUTO: 0.02 K/UL (ref 0–0.11)
IMM GRANULOCYTES NFR BLD AUTO: 0.3 % (ref 0–0.9)
LYMPHOCYTES # BLD AUTO: 1.96 K/UL (ref 1–4.8)
LYMPHOCYTES NFR BLD: 24.9 % (ref 22–41)
MCH RBC QN AUTO: 30.7 PG (ref 27–33)
MCH RBC QN AUTO: 30.7 PG (ref 27–33)
MCHC RBC AUTO-ENTMCNC: 32.6 G/DL (ref 32.2–35.5)
MCHC RBC AUTO-ENTMCNC: 33 G/DL (ref 32.2–35.5)
MCV RBC AUTO: 93.1 FL (ref 81.4–97.8)
MCV RBC AUTO: 94 FL (ref 81.4–97.8)
MONOCYTES # BLD AUTO: 0.57 K/UL (ref 0–0.85)
MONOCYTES NFR BLD AUTO: 7.2 % (ref 0–13.4)
NEUTROPHILS # BLD AUTO: 4.71 K/UL (ref 1.82–7.42)
NEUTROPHILS NFR BLD: 59.7 % (ref 44–72)
NRBC # BLD AUTO: 0 K/UL
NRBC BLD-RTO: 0 /100 WBC (ref 0–0.2)
PLATELET # BLD AUTO: 268 K/UL (ref 164–446)
PLATELET # BLD AUTO: 276 K/UL (ref 164–446)
PMV BLD AUTO: 9.2 FL (ref 9–12.9)
PMV BLD AUTO: 9.6 FL (ref 9–12.9)
POTASSIUM SERPL-SCNC: 3.7 MMOL/L (ref 3.6–5.5)
POTASSIUM SERPL-SCNC: 3.9 MMOL/L (ref 3.6–5.5)
PROT SERPL-MCNC: 6.4 G/DL (ref 6–8.2)
RBC # BLD AUTO: 3.49 M/UL (ref 4.2–5.4)
RBC # BLD AUTO: 3.78 M/UL (ref 4.2–5.4)
SODIUM SERPL-SCNC: 140 MMOL/L (ref 135–145)
SODIUM SERPL-SCNC: 140 MMOL/L (ref 135–145)
WBC # BLD AUTO: 7.9 K/UL (ref 4.8–10.8)
WBC # BLD AUTO: 9 K/UL (ref 4.8–10.8)

## 2024-02-13 PROCEDURE — 70450 CT HEAD/BRAIN W/O DYE: CPT

## 2024-02-13 PROCEDURE — A9270 NON-COVERED ITEM OR SERVICE: HCPCS | Performed by: PHYSICAL MEDICINE & REHABILITATION

## 2024-02-13 PROCEDURE — 97110 THERAPEUTIC EXERCISES: CPT

## 2024-02-13 PROCEDURE — 94760 N-INVAS EAR/PLS OXIMETRY 1: CPT

## 2024-02-13 PROCEDURE — 85025 COMPLETE CBC W/AUTO DIFF WBC: CPT

## 2024-02-13 PROCEDURE — 99284 EMERGENCY DEPT VISIT MOD MDM: CPT | Mod: 25

## 2024-02-13 PROCEDURE — 97129 THER IVNTJ 1ST 15 MIN: CPT

## 2024-02-13 PROCEDURE — 700102 HCHG RX REV CODE 250 W/ 637 OVERRIDE(OP): Performed by: PHYSICAL MEDICINE & REHABILITATION

## 2024-02-13 PROCEDURE — 80048 BASIC METABOLIC PNL TOTAL CA: CPT

## 2024-02-13 PROCEDURE — 99233 SBSQ HOSP IP/OBS HIGH 50: CPT | Performed by: PHYSICAL MEDICINE & REHABILITATION

## 2024-02-13 PROCEDURE — 85027 COMPLETE CBC AUTOMATED: CPT

## 2024-02-13 PROCEDURE — 36415 COLL VENOUS BLD VENIPUNCTURE: CPT

## 2024-02-13 PROCEDURE — 97535 SELF CARE MNGMENT TRAINING: CPT

## 2024-02-13 PROCEDURE — 97530 THERAPEUTIC ACTIVITIES: CPT

## 2024-02-13 PROCEDURE — 80053 COMPREHEN METABOLIC PANEL: CPT

## 2024-02-13 PROCEDURE — 770024 HCHG ROOM/CARE - REHAB LOA (180)

## 2024-02-13 PROCEDURE — 97130 THER IVNTJ EA ADDL 15 MIN: CPT

## 2024-02-13 PROCEDURE — 700111 HCHG RX REV CODE 636 W/ 250 OVERRIDE (IP): Performed by: PHYSICAL MEDICINE & REHABILITATION

## 2024-02-13 PROCEDURE — 97116 GAIT TRAINING THERAPY: CPT

## 2024-02-13 PROCEDURE — 700111 HCHG RX REV CODE 636 W/ 250 OVERRIDE (IP): Mod: JZ | Performed by: PHYSICAL MEDICINE & REHABILITATION

## 2024-02-13 RX ORDER — ZIPRASIDONE MESYLATE 20 MG/ML
10 INJECTION, POWDER, LYOPHILIZED, FOR SOLUTION INTRAMUSCULAR NIGHTLY PRN
Status: DISCONTINUED | OUTPATIENT
Start: 2024-02-13 | End: 2024-02-15

## 2024-02-13 RX ORDER — QUETIAPINE FUMARATE 25 MG/1
25 TABLET, FILM COATED ORAL NIGHTLY PRN
Status: DISCONTINUED | OUTPATIENT
Start: 2024-02-13 | End: 2024-02-14

## 2024-02-13 RX ORDER — CYCLOBENZAPRINE HCL 10 MG
10 TABLET ORAL 3 TIMES DAILY PRN
Status: DISCONTINUED | OUTPATIENT
Start: 2024-02-13 | End: 2024-02-14

## 2024-02-13 RX ADMIN — MONTELUKAST 10 MG: 10 TABLET, FILM COATED ORAL at 20:39

## 2024-02-13 RX ADMIN — FAMOTIDINE 20 MG: 20 TABLET ORAL at 20:39

## 2024-02-13 RX ADMIN — ACETAMINOPHEN 650 MG: 325 TABLET ORAL at 10:21

## 2024-02-13 RX ADMIN — OMEPRAZOLE 20 MG: 20 CAPSULE, DELAYED RELEASE ORAL at 10:20

## 2024-02-13 RX ADMIN — SENNOSIDES AND DOCUSATE SODIUM 2 TABLET: 50; 8.6 TABLET ORAL at 10:19

## 2024-02-13 RX ADMIN — MECLIZINE HYDROCHLORIDE 12.5 MG: 25 TABLET ORAL at 10:19

## 2024-02-13 RX ADMIN — ENOXAPARIN SODIUM 40 MG: 100 INJECTION SUBCUTANEOUS at 18:04

## 2024-02-13 RX ADMIN — GABAPENTIN 100 MG: 100 CAPSULE ORAL at 20:39

## 2024-02-13 RX ADMIN — CYCLOBENZAPRINE 10 MG: 10 TABLET, FILM COATED ORAL at 10:19

## 2024-02-13 RX ADMIN — GABAPENTIN 100 MG: 100 CAPSULE ORAL at 10:20

## 2024-02-13 RX ADMIN — CLOPIDOGREL BISULFATE 75 MG: 75 TABLET ORAL at 10:19

## 2024-02-13 RX ADMIN — METOPROLOL TARTRATE 100 MG: 25 TABLET, FILM COATED ORAL at 05:34

## 2024-02-13 RX ADMIN — METOPROLOL TARTRATE 100 MG: 25 TABLET, FILM COATED ORAL at 18:02

## 2024-02-13 RX ADMIN — PAROXETINE HYDROCHLORIDE 40 MG: 20 TABLET, FILM COATED ORAL at 10:18

## 2024-02-13 RX ADMIN — LOSARTAN POTASSIUM 100 MG: 50 TABLET, FILM COATED ORAL at 05:33

## 2024-02-13 RX ADMIN — MECLIZINE HYDROCHLORIDE 12.5 MG: 25 TABLET ORAL at 20:39

## 2024-02-13 RX ADMIN — ATORVASTATIN CALCIUM 40 MG: 40 TABLET, FILM COATED ORAL at 20:39

## 2024-02-13 RX ADMIN — ZIPRASIDONE MESYLATE 10 MG: 20 INJECTION, POWDER, LYOPHILIZED, FOR SOLUTION INTRAMUSCULAR at 22:19

## 2024-02-13 RX ADMIN — ASPIRIN 81 MG 81 MG: 81 TABLET ORAL at 10:19

## 2024-02-13 ASSESSMENT — PAIN DESCRIPTION - PAIN TYPE: TYPE: ACUTE PAIN

## 2024-02-13 ASSESSMENT — FIBROSIS 4 INDEX: FIB4 SCORE: 1.26

## 2024-02-13 ASSESSMENT — ACTIVITIES OF DAILY LIVING (ADL): BED_CHAIR_WHEELCHAIR_TRANSFER_DESCRIPTION: VERBAL CUEING;SET-UP OF EQUIPMENT;INCREASED TIME;SUPERVISION FOR SAFETY

## 2024-02-13 ASSESSMENT — GAIT ASSESSMENTS
ASSISTIVE DEVICE: FRONT WHEEL WALKER
DISTANCE (FEET): 15
GAIT LEVEL OF ASSIST: MINIMAL ASSIST

## 2024-02-13 NOTE — THERAPY
Physical Therapy   Daily Treatment     Patient Name: Lorraine Leung  Age:  78 y.o., Sex:  female  Medical Record #: [id number]  Today's Date: 2/12/2024     Precautions  Precautions: Fall Risk  Comments: [place], pt wears headphones amplifier, O2=3.5L no wean    Subjective    Patient willing to participate in session     Objective       02/12/24 1431   PT Charge Group   PT Therapeutic Exercise (Units) 1   PT Therapeutic Activities (Units) 1   PT Total Time Spent   PT Individual Total Time Spent (Mins) 30   Precautions   Precautions Fall Risk   Comments [place], pt wears headphones amplifier, O2=3.5L no wean   Vitals   Pulse Oximetry   (02= 78% on 4L after seated ther-ex)   Transfer Functional Level of Assist   Bed, Chair, Wheelchair Transfer Minimal Assist   Bed Chair Wheelchair Transfer Description Increased time;Verbal cueing;Set-up of equipment;Supervision for safety   Sitting Lower Body Exercises   Ankle Pumps 1 set of 10   Hip Abduction 1 set of 10;Bilateral   Hip Adduction 1 set of 10;Bilateral   Long Arc Quad 1 set of 10;Bilateral   Marching 1 set of 10;Reciprocal   Hamstring Curl 1 set of 10;Bilateral         Assessment    Tendency for O2 to drop to 78% with seated there-ex on 4L from concentrator with constant cues for nasal cannula placement; able to recover to 90% within 1min with cues for breathing technique    Strengths: Able to follow instructions, Adequate strength, Alert and oriented, Effective communication skills, Independent prior level of function, Motivated for self care and independence, Manages pain appropriately, Pleasant and cooperative, Supportive family, Willingly participates in therapeutic activities  Barriers: Fatigue, Decreased endurance, Hearing impairment, Hemiplegia, Impaired activity tolerance, Impaired balance, Pain    Plan    Education in exercise with pacing  Transfers  Ambulation with FWW  Continue seated there-ex, endurance work    DME  PT DME Recommendations  Assistive Device: Front  Wheeled Walker, Single Point Cane    Passport items to be completed:  Get in/out of bed safely, in/out of a vehicle, safely use mobility device, walk or wheel around home/community, navigate up and down stairs, show how to get up/down from the ground, ensure home is accessible, demonstrate HEP, complete caregiver training    Physical Therapy Problems (Active)       Problem: Balance       Dates: Start:  02/10/24         Goal: STG-Within one week, patient will maintain static standing for at least 2 minutes using FWW with single UE support and CGA to facilitate standing ADLs        Dates: Start:  02/10/24               Problem: Mobility       Dates: Start:  02/10/24         Goal: STG-Within one week, patient will ambulate household distance of 50 feet using FWW CGA        Dates: Start:  02/10/24            Goal: STG-Within one week, patient will ascend and descend three to four stairs CGA       Dates: Start:  02/10/24               Problem: Mobility Transfers       Dates: Start:  02/10/24         Goal: STG-Within one week, patient will perform stand step transfer from bed to chair Jody using FWW        Dates: Start:  02/10/24               Problem: PT-Long Term Goals       Dates: Start:  02/10/24         Goal: LTG-By discharge, patient will tolerate standing for at least 5 minutes SPV with LRAD in order to facilitate standing ADLs       Dates: Start:  02/10/24            Goal: LTG-By discharge, patient will ambulate community distances of 150' SPV with LRAD       Dates: Start:  02/10/24            Goal: LTG-By discharge, patient will transfer one surface to another SPV using LRAD        Dates: Start:  02/10/24            Goal: LTG-By discharge, patient will ambulate up/down 4-6 stairs SPV using bilateral hand rails       Dates: Start:  02/10/24

## 2024-02-13 NOTE — CARE PLAN
The patient is Stable - Low risk of patient condition declining or worsening    Problem: Knowledge Deficit - Standard  Goal: Patient and family/care givers will demonstrate understanding of plan of care, disease process/condition, diagnostic tests and medications  Outcome: Progressing. Reviewed POC, all questions answered.        Problem: Fall Risk - Rehab  Goal: Patient will remain free from falls  Outcome: Progressing. Call light within reach, pt educated to use for assistance for safe transferring.         Shift Goals  Clinical Goals: Safety  Patient Goals: Participate in therapy

## 2024-02-13 NOTE — DISCHARGE PLANNING
Case Management/IDT follow up.   IDT continues to recommend IRF level of care as patient continue to make progress with all therapies.   Projected dc date set for 2/23/24.      DC needs:  Recommendations made for home health for PT/OT/SLP  Follow up with: PCP    Met with patient and spoke with patient's daughter providing update from IDT and discussed plan of care.    Plan:  Continue to follow

## 2024-02-13 NOTE — THERAPY
"Occupational Therapy  Daily Treatment     Patient Name: Lorraine Leung  Age:  78 y.o., Sex:  female  Medical Record #: [id number]  Today's Date: 2/13/2024     Precautions  Precautions: Fall Risk  Comments: [place], pt wears headphones amplifier, O2=3.5L no wean    Safety   ADL Safety : Requires Supervision for Safety, Requires Physical Assist for Safety, Requires Cueing for Safety  Bathroom Safety: Requires Supervision for Safety, Requires Physical Assist for Safety, Requires Cuing for Safety    Subjective    \"I don't know why these terrible shows stay on TV for years.\"      Objective       02/13/24 0901   OT Charge Group   OT Self Care / ADL (Units) 1   OT Therapeutic Exercise (Units) 3   OT Total Time Spent   OT Individual Total Time Spent (Mins) 60   Safety    ADL Safety  Requires Supervision for Safety;Requires Physical Assist for Safety;Requires Cueing for Safety   Bathroom Safety Requires Supervision for Safety;Requires Physical Assist for Safety;Requires Cuing for Safety   Vitals   Pulse Oximetry (!) 87 %  (on 4L O2 w/concentrator)   O2 (LPM) 4   O2 Delivery Device Nasal Cannula   Functional Level of Assist   Toilet Transfers Contact Guard Assist   Toilet Transfer Description Adaptive equipment;Supervision for safety;Grab bar;Verbal cueing   Sitting Upper Body Exercises   Sitting Upper Body Exercises Yes   Bicep Curls Bilateral  (2 sets of 20. Verbal and tactile cues to attend to task and attend to right side)   Pronation / Supination Bilateral;1 set of 15   Wrist Flexion / Extension 1 set of 15   Upper Extremity Bike   (17 minutes on \"coordination\" cycle)   Strengths & Barriers   Strengths Making steady progress towards goals;Motivated for self care and independence;Pleasant and cooperative;Supportive family;Willingly participates in therapeutic activities   Barriers Fatigue;Hemiparesis;Generalized weakness;Hearing impairment;Impaired activity tolerance;Impaired balance;Impaired insight/denial of deficits;Limited " mobility     Pt required mod VC and tactile cues to attend to R side and to focus on task during exercise activity. Today pt completed a tub transfer using tub transfer bench in ADL room. Pt was able to lift both legs in and out of the tub with CGA to stay on tub bench during transfer.    Assessment    Pt tolerated therapy well, despite needing increased oxygen during exertion. She is motivated to be as independent as possible and is continuing to benefit from OT while in rehab.   Strengths: (P) Making steady progress towards goals, Motivated for self care and independence, Pleasant and cooperative, Supportive family, Willingly participates in therapeutic activities  Barriers: (P) Fatigue, Hemiparesis, Generalized weakness, Hearing impairment, Impaired activity tolerance, Impaired balance, Impaired insight/denial of deficits, Limited mobility    Plan    ADLs, address R sided neglect    DME  Tub bench    Passport items to be completed:  Perform bathroom transfers, complete dressing, complete feeding, get ready for the day, prepare a simple meal, participate in household tasks, adapt home for safety needs, demonstrate home exercise program, complete caregiver training     Occupational Therapy Goals (Active)       Problem: Dressing       Dates: Start:  02/10/24         Goal: STG-Within one week, patient will dress UB at Mod Indep       Dates: Start:  02/10/24            Goal: STG-Within one week, patient will dress LB at Sup via AE/DME PRN       Dates: Start:  02/10/24               Problem: Functional Transfers       Dates: Start:  02/10/24         Goal: STG-Within one week, patient will transfer to toilet at Sup via DME       Dates: Start:  02/10/24               Problem: IADL's       Dates: Start:  02/10/24         Goal: STG-Within one week, patient will prepare a simple meal at Min assist via AE/DME PRN.       Dates: Start:  02/10/24               Problem: OT Long Term Goals       Dates: Start:  02/10/24          Goal: LTG-By discharge, patient will complete basic self care tasks at Sup to Mod Indep via AE/DME PRN.       Dates: Start:  02/10/24            Goal: LTG-By discharge, patient will perform bathroom transfers at Sup via DME.       Dates: Start:  02/10/24               Problem: Toileting       Dates: Start:  02/10/24         Goal: STG-Within one week, patient will complete toileting tasks at Sup via AE/DME PRN - including management of disposable briefs and hygiene.       Dates: Start:  02/10/24

## 2024-02-13 NOTE — CARE PLAN
Problem: Balance  Goal: STG-Within one week, patient will maintain static standing for at least 2 minutes using FWW with single UE support and CGA to facilitate standing ADLs   Outcome: Not Met     Problem: Mobility  Goal: STG-Within one week, patient will ambulate household distance of 50 feet using FWW CGA   Outcome: Not Met  Goal: STG-Within one week, patient will ascend and descend three to four stairs CGA  Outcome: Not Met     Problem: Mobility Transfers  Goal: STG-Within one week, patient will perform stand step transfer from bed to chair Jody using FWW   Outcome: Met

## 2024-02-13 NOTE — THERAPY
"Speech Language Pathology  Daily Treatment     Patient Name: Lorraine Leung  Age:  78 y.o., Sex:  female  Medical Record #: [id number]  Today's Date: 2/13/2024     Precautions  Precautions: Fall Risk  Comments: [place], pt wears headphones amplifier, O2=3.5L no wean    Subjective    Pt was willing to participate in ST sessions.       Objective       02/13/24 0831   Treatment Charges   SLP Cognitive Skill Development First 15 Minutes 1   SLP Cognitive Skill Development Additional 15 Minutes 3   SLP Total Time Spent   SLP Individual Total Time Spent (Mins) 60         Assessment    3231-2872: Pt expressed frustration about the \"Who has more\" task completed in therapy yesterday stating that it was \"much more difficult than it should have been\" and she requested to work on a different activity.  Pt initiated an attention task requiring her to locate the word \"the\" in a multi-paragraph story.  On her first attempt she was able to locate 9/15 targets.  On her second attempt pt did not recall the instructions and highlighted all the \"thes\" outside of the target box.  Pt was unable to complete this activity during this session due to time constraints.      6840-0783: Pt was asleep in bed at the beginning of this session.  Pt was initially difficult to rouse, but once awake was willing to participate in ST at bedside.  Pt continued the \"Locating the\" task; however due to fatigue fell asleep several times attempting to complete this activity.  When cued to read the story aloud instead of to herself she was able to remain awake and with min cues was able to locate the remaining 5 targets.      Strengths: Able to follow instructions, Effective communication skills, Independent prior level of function, Good insight into deficits/needs, Willingly participates in therapeutic activities, Pleasant and cooperative  Barriers: Impaired functional cognition, Hearing impairment    Plan    Continue to target, simple money math, attention and " memory.       Speech Therapy Problems (Active)       Problem: Memory STGs       Dates: Start:  02/10/24         Goal: STG-Within one week, patient will       Dates: Start:  02/10/24       Description: 1) Individualized goal:  recall novel information related to rehabilitation using external and internal memory strategies with 80% acc and MIN A.   2) Interventions:  SLP Self Care / ADL Training , SLP Cognitive Skill Development, and SLP Group Treatment          Goal Note filed on 02/13/24 0945 by Khang Vasques MS,CCC-SLP       Continue to target, recently evaluated                  Problem: Problem Solving STGs       Dates: Start:  02/13/24         Goal: STG-Within one week, patient will complete medication management tasks with min A to achieve 80% accuracy.         Dates: Start:  02/13/24            Goal: STG-Within one week, patient will complete financial management tasks with min A to achieve 80% accuracy.         Dates: Start:  02/13/24               Problem: Speech/Swallowing LTGs       Dates: Start:  02/10/24         Goal: LTG-By discharge, patient will safely swallow       Dates: Start:  02/10/24       Description: 1) Individualized goal:  level 7 regular textures and thin liquids with no overt clinical s/sx of aspiration for a safe D/C to PLOF.  2) Interventions:  SLP Swallowing Dysfunction Treatment, SLP Self Care / ADL Training , and SLP Group Treatment             Goal: LTG-By discharge, patient will solve complex problem       Dates: Start:  02/10/24       Description: 1) Individualized goal:  related to health/safety with 80% acc and MOD I for a safe D/C to PLOF.   2) Interventions:  SLP Self Care / ADL Training , SLP Cognitive Skill Development, and SLP Group Treatment                Problem: Swallowing STGs       Dates: Start:  02/10/24         Goal: STG-Within one week, patient will safely swallow       Dates: Start:  02/10/24       Description: 1) Individualized goal:  trials of level 7 regular  textures and thin liquids with no overt clinical s/sx of aspiration for 2/2 meals w/ SLP.  2) Interventions:  SLP Swallowing Dysfunction Treatment, SLP Self Care / ADL Training , and SLP Group Treatment          Goal Note filed on 02/13/24 0945 by Khang Vasques MS,CCC-SLP       Per notes it was recommended that pt was seen x1 more meal to assess tolerance.

## 2024-02-13 NOTE — THERAPY
Physical Therapy   Daily Treatment     Patient Name: Lorraine Leung  Age:  78 y.o., Sex:  female  Medical Record #: [id number]  Today's Date: 2/13/2024     Precautions  Precautions: Fall Risk  Comments: [place], pt wears headphones amplifier, O2=3.5L no wean    Subjective    Patient reports she tired as she did not sleep tonight     Objective       02/13/24 1231   PT Charge Group   PT Gait Training (Units) 1   PT Therapeutic Exercise (Units) 1   PT Therapeutic Activities (Units) 2   PT Total Time Spent   PT Individual Total Time Spent (Mins) 60   Precautions   Precautions Fall Risk   Comments [place], pt wears headphones amplifier, O2=3.5L no wean   Vitals   Pulse Oximetry   (78% in sitting, found without nasal cannula in nose; patient symptomatic and dizzy)   Gait Functional Level of Assist    Gait Level Of Assist Minimal Assist   Assistive Device Front Wheel Walker   Distance (Feet) 15  (15ft FWW CGA with 02 management, 5ft FWW min assist with w/c follow)   Transfer Functional Level of Assist   Bed, Chair, Wheelchair Transfer Minimal Assist   Bed Chair Wheelchair Transfer Description Verbal cueing;Set-up of equipment;Increased time;Supervision for safety  (constant verbal cues for safe sequencing)   Supine Lower Body Exercise   Supine Lower Body Exercises   (used as cool-down; hand-out given)   Heel Slide 1 set of 10;Bilateral   Ankle Pumps 1 set of 10;Reciprocal   Gluteal Isometrics 1 set of 10;Bilateral   Quadriceps Isometrics 1 set of 10;Bilateral   Sitting Lower Body Exercises   Sitting Lower Body Exercises   (used as warm up)   Ankle Pumps 1 set of 10;Bilateral   Hip Abduction 1 set of 10;Bilateral   Hip Adduction 1 set of 10;Bilateral   Long Arc Quad 1 set of 10;Bilateral   Marching Reciprocal;1 set of 10   Hamstring Curl 1 set of 10;Bilateral;Light Resistance Theraband         Assessment    Patient able to maintain O2 to 90% with seated there-ex, O2 saturation drops to 85% with ambulation, patient reports dizziness;  able to recover to 90% within 1min of seated rest breaks;  requires constant cues for safe transfer sequence due to [place] or poor motor planning?    Strengths: Able to follow instructions, Adequate strength, Alert and oriented, Effective communication skills, Independent prior level of function, Motivated for self care and independence, Manages pain appropriately, Pleasant and cooperative, Supportive family, Willingly participates in therapeutic activities  Barriers: Fatigue, Decreased endurance, Hearing impairment, Hemiplegia, Impaired activity tolerance, Impaired balance, Pain    Plan    Check O2 saturation with exercise  Education in exercise with pacing  Transfers  Ambulation with FWW  Continue seated there-ex, endurance work     DME  PT DME Recommendations  Assistive Device: Front Wheeled Walker, Single Point Cane     Passport items to be completed:  Get in/out of bed safely, in/out of a vehicle, safely use mobility device, walk or wheel around home/community, navigate up and down stairs, show how to get up/down from the ground, ensure home is accessible, demonstrate HEP, complete caregiver training    Physical Therapy Problems (Active)       Problem: Balance       Dates: Start:  02/10/24         Goal: STG-Within one week, patient will maintain static standing for at least 2 minutes using FWW with single UE support and CGA to facilitate standing ADLs        Dates: Start:  02/10/24               Problem: Mobility       Dates: Start:  02/10/24         Goal: STG-Within one week, patient will ambulate household distance of 50 feet using FWW CGA        Dates: Start:  02/10/24            Goal: STG-Within one week, patient will ascend and descend three to four stairs CGA       Dates: Start:  02/10/24               Problem: PT-Long Term Goals       Dates: Start:  02/10/24         Goal: LTG-By discharge, patient will tolerate standing for at least 5 minutes SPV with LRAD in order to facilitate standing ADLs       Dates:  Start:  02/10/24            Goal: LTG-By discharge, patient will ambulate community distances of 150' SPV with LRAD       Dates: Start:  02/10/24            Goal: LTG-By discharge, patient will transfer one surface to another SPV using LRAD        Dates: Start:  02/10/24            Goal: LTG-By discharge, patient will ambulate up/down 4-6 stairs SPV using bilateral hand rails       Dates: Start:  02/10/24

## 2024-02-13 NOTE — CARE PLAN
Problem: Swallowing STGs  Goal: STG-Within one week, patient will safely swallow  Description: 1) Individualized goal:  trials of level 7 regular textures and thin liquids with no overt clinical s/sx of aspiration for 2/2 meals w/ SLP.  2) Interventions:  SLP Swallowing Dysfunction Treatment, SLP Self Care / ADL Training , and SLP Group Treatment      Outcome: Not Met  Note: Per notes it was recommended that pt was seen x1 more meal to assess tolerance.       Problem: Problem Solving STGs  Goal: STG-Within one week, patient will  Description: 1) Individualized goal:  complete the SCCAN with additional goals developed as appropriate.   2) Interventions:  SLP Self Care / ADL Training , SLP Cognitive Skill Development, and SLP Group Treatment      Outcome: Met  Note: SCCAN completed      Problem: Memory STGs  Goal: STG-Within one week, patient will  Description: 1) Individualized goal:  recall novel information related to rehabilitation using external and internal memory strategies with 80% acc and MIN A.   2) Interventions:  SLP Self Care / ADL Training , SLP Cognitive Skill Development, and SLP Group Treatment      Outcome: Not Met  Note: Continue to target, recently evaluated

## 2024-02-13 NOTE — CARE PLAN
"The patient is Stable - Low risk of patient condition declining or worsening    Shift Goals  Clinical Goals: Safety  Patient Goals: Participate in therapy    Progress made toward(s) clinical / shift goals:      Problem: Pain - Standard  Goal: Alleviation of pain or a reduction in pain to the patient’s comfort goal  Outcome: Progressing  Note: No reports of pain at this time. Will continue to monitor through shift with hourly rounds.     Patient is not progressing towards the following goals:    Problem: Fall Risk - Rehab  Goal: Patient will remain free from falls  Outcome: Not Met  Note: Tiffani Hinton Fall risk Assessment Score: 18    High fall risk Interventions   - Alarming seatbelt  - Bed and strip alarm   - Yellow sign by the door   - Yellow wrist band \"Fall risk\"  - Room near to the nurse station  - Do not leave patient unattended in the bathroom  - Fall risk education provided     "

## 2024-02-13 NOTE — WOUND TEAM
[facility]  Inpatient Services  Initial Wound and Skin Care Evaluation    Admission Date: 2024     Last order of IP CONSULT TO WOUND CARE was found on 2024 from Hospital Encounter on 2024     HPI, PMH, SH: Reviewed    History reviewed. No pertinent surgical history.  Social History     Tobacco Use    Smoking status: Former     Current packs/day: 0.00     Types: Cigarettes     Quit date: 1990     Years since quittin.1     Passive exposure: Never    Smokeless tobacco: Not on file   Substance Use Topics    Alcohol use: Not on file     No chief complaint on file.    Diagnosis: Acute ischemic left MCA stroke (HCC) [I63.512]    Unit where seen by Wound Team:      WOUND CONSULT RELATED TO:  Heels and buttocks    WOUND TEAM PLAN OF CARE - Frequency of Follow-up:   Nursing to follow dressing orders written for wound care. Contact wound team if area fails to progress, deteriorates or with any questions/concerns if something comes up before next scheduled follow up (See below as to whether wound is following and frequency of wound follow up)   Weekly -     WOUND HISTORY:   Pt arrived with partial thickness and what appeared to be a moisture fissure with some satellite lesions to coccyx and redness to heels. Pt reported incontinence of bowel and bladder.        WOUND ASSESSMENT/LDA  MASD to coccyx area 2.3x1.2 partial thickness irregular edges.  Wound bed is red and blanching. Heels are both found to have some redness with blanching.      Vascular:    MARRY:   No results found.    Lab Values:    Lab Results   Component Value Date/Time    WBC 9.0 2024 06:08 AM    RBC 3.49 (L) 2024 06:08 AM    HEMOGLOBIN 10.7 (L) 2024 06:08 AM    HEMATOCRIT 32.8 (L) 2024 06:08 AM    HBA1C 5.8 (H) 02/10/2024 05:39 AM         Culture Results show:  No results found for this or any previous visit (from the past 720 hour(s)).    Pain Level/Medicated:  None, Tolerated without  pain medication       INTERVENTIONS BY WOUND TEAM:  Chart and images reviewed. Discussed with bedside RN. All areas of concern (based on picture review, LDA review and discussion with bedside RN) have been thoroughly assessed. Documentation of areas based on significant findings. This RN in to assess patient. Performed standard wound care which includes appropriate positioning, dressing removal and non-selective debridement. Pictures and measurements obtained weekly if/when required.    Wound:  Coccyx  Cleansed/Non-selectively Debrided with:  Gauze and Moist warm washcloth  Layer of barrier cream with zinc    Advanced Wound Care Discharge Planning  Number of Clinicians necessary to complete wound care: 1  Is patient requiring IV pain medications for dressing changes:  No   Length of time for dressing change 30 min. (This does not include chart review, pre-medication time, set up, clean up or time spent charting.)    Interdisciplinary consultation: Patient, Bedside RN (Oksana,  Allyssa .  Pressure injury and staging reviewed with Unit Leadership Coco Barroso    EVALUATION / RATIONALE FOR TREATMENT:     Date:  02/13/24  Wound Status:  Initial evaluation    MASD to coccyx area 2.3x1.2 partial thickness irregular edges.  Wound bed is red and blanching.         Goals: Steady decrease in wound area and depth weekly.    NURSING PLAN OF CARE ORDERS:  RN Prevention Protocol    NUTRITION RECOMMENDATIONS   Wound Team Recommendations:  Dietary consult    DIET ORDERS (From admission to next 24h)       Start     Ordered    02/10/24 1514  Diet Order Diet: Regular  ALL MEALS        Question:  Diet:  Answer:  Regular    02/10/24 1514    02/10/24 0812  DIET COMMENT  ALL MEALS        Comments: Please prepare a trial tray of level 6 soft and bite size and thin liquids ASAP for pt's breakfast meal today, 2/10/2024. Thank you!    02/10/24 0812                    PREVENTATIVE INTERVENTIONS:    Q shift [place] - performed per nursing  policy  Q shift pressure point assessments - performed per nursing policy    Surface/Positioning  Low [place] - Ordered  Waffle overlay  - Currently in Place    Containment/Moisture Prevention    Dri-kendell pad - Currently in Place  Barrier paste - Applied this Visit    Mobilization      Up to chair     Anticipated discharge plans:  Self/Family Care        Vac Discharge Needs:  Vac Discharge plan is purely a recommendation from wound team and not a requirement for discharge unless otherwise stated by physician.  Not Applicable Pt not on a wound vac

## 2024-02-13 NOTE — PROGRESS NOTES
Physical Medicine & Rehabilitation Progress Note  _____________________________________  Interdisciplinary Team Conference   Most recent IDT on 2/13/2024    ICarlin M.D., was present and led the interdisciplinary team conference on 2/13/2024.  I led the IDT conference and agree with the IDT conference documentation and plan of care as noted below.     Nursing:  Diet Current Diet Order   Procedures    Diet Order Diet: Regular       Eating ADL Modified Independent  Increased time   % of Last Meal  Oral Nutrition: Between 50-75% Consumed, Lunch   Sleep    Bowel Last BM: 02/10/24   Bladder    Barriers to Discharge Home: Weakness      Physical Therapy:  Bed Mobility    Transfers Minimal Assist  Increased time, Verbal cueing, Set-up of equipment, Supervision for safety   Mobility Minimal Assist   Stairs    Barriers to Discharge Home: weakness      Occupational Therapy:  Grooming Supervision (cues to locate items to the right)   Bathing Moderate Assist   UB Dressing Minimal Assist   LB Dressing Moderate Assist (to don pants and  tread socks)   Toileting Moderate Assist   Shower & Transfer    Barriers to Discharge Home:weakness      Speech-Language Pathology:  Comprehension:  Modified Independent  Comprehension Description:  Hearing aids/amplifiers, Glasses  Expression:  Modified Independent  Expression Description:  Verbal cueing  Social Interaction:  Modified Independent  Social Interaction Description:  Increased time, Schedule  Problem Solving:  Minimal Assist  Problem Solving Description:  Increased time, Verbal cueing, Bed/chair alarm, Therapy schedule, Seat belt  Memory:  Moderate Assist  Memory Description:  Increased time, Verbal cueing, Bed/chair alarm, Therapy schedule, Seat belt  Barriers to Discharge Home: Aphasia    Respiratory Therapy:  O2 (LPM): 3  O2 Delivery Device: Nasal Cannula    Case Management:  Continues to work on disposition and DME needs.      Discharge  "Date/Disposition:  2/23/24  _____________________________________   Encounter Date: 2/13/2024    Chief Complaint: Weakness    Interval Events (Subjective):  Seen in chair. Tolerating therapy. No orthostatic hypotension. Vertigo is an issue but chronic. Sleeping well. No pain    Objective:  VITAL SIGNS: /60   Pulse 77   Temp 37.2 °C (98.9 °F) (Oral)   Resp 18   Ht 1.676 m (5' 6\")   Wt 63 kg (138 lb 14.2 oz)   SpO2 (!) 87%   BMI 22.42 kg/m²   Gen: No acute distress, well developed well nourished adult  HEENT: Normal Cephalic Atraumatic, Normal conjunctiva.   CV: warm extremities, well perfused, no edema  Resp: symmetric chest rise, breathing comfortably on room air  Abd: Soft, Non distended  Extremities: normal bulk, no atrophy  Skin: no visible rashes or lesions.   Neuro: alert, awake  Psych: Mood and affect appropriate and congruent    Laboratory Values:  Recent Results (from the past 72 hour(s))   CBC WITHOUT DIFFERENTIAL    Collection Time: 02/13/24  6:08 AM   Result Value Ref Range    WBC 9.0 4.8 - 10.8 K/uL    RBC 3.49 (L) 4.20 - 5.40 M/uL    Hemoglobin 10.7 (L) 12.0 - 16.0 g/dL    Hematocrit 32.8 (L) 37.0 - 47.0 %    MCV 94.0 81.4 - 97.8 fL    MCH 30.7 27.0 - 33.0 pg    MCHC 32.6 32.2 - 35.5 g/dL    RDW 41.4 35.9 - 50.0 fL    Platelet Count 276 164 - 446 K/uL    MPV 9.6 9.0 - 12.9 fL   Comp Metabolic Panel    Collection Time: 02/13/24  6:08 AM   Result Value Ref Range    Sodium 140 135 - 145 mmol/L    Potassium 3.9 3.6 - 5.5 mmol/L    Chloride 104 96 - 112 mmol/L    Co2 26 20 - 33 mmol/L    Anion Gap 10.0 7.0 - 16.0    Glucose 89 65 - 99 mg/dL    Bun 16 8 - 22 mg/dL    Creatinine 0.69 0.50 - 1.40 mg/dL    Calcium 8.3 (L) 8.5 - 10.5 mg/dL    Correct Calcium 8.9 8.5 - 10.5 mg/dL    AST(SGOT) 15 12 - 45 U/L    ALT(SGPT) 11 2 - 50 U/L    Alkaline Phosphatase 118 (H) 30 - 99 U/L    Total Bilirubin 0.3 0.1 - 1.5 mg/dL    Albumin 3.2 3.2 - 4.9 g/dL    Total Protein 6.4 6.0 - 8.2 g/dL    Globulin 3.2 1.9 - " 3.5 g/dL    A-G Ratio 1.0 g/dL   ESTIMATED GFR    Collection Time: 02/13/24  6:08 AM   Result Value Ref Range    GFR (CKD-EPI) 89 >60 mL/min/1.73 m 2       Medications:  Scheduled Medications   Medication Dose Frequency    famotidine  20 mg Q EVENING    amLODIPine  10 mg Q DAY    aspirin  81 mg DAILY    atorvastatin  40 mg Q EVENING    clopidogrel  75 mg DAILY    cyclobenzaprine  10 mg BID    gabapentin  100 mg BID    losartan  100 mg Q DAY    metoprolol tartrate  100 mg BID    montelukast  10 mg Nightly    PARoxetine  40 mg DAILY    Pharmacy Consult Request  1 Each PHARMACY TO DOSE    senna-docusate  2 Tablet BID    omeprazole  20 mg DAILY    meclizine  12.5 mg BID    enoxaparin (LOVENOX) injection  40 mg DAILY AT 1800     PRN medications: acetaminophen, Respiratory Therapy Consult, hydrALAZINE, senna-docusate **AND** polyethylene glycol/lytes, ondansetron **OR** ondansetron, traZODone, sodium chloride, meclizine, hydrOXYzine HCl    Diet:  Current Diet Order   Procedures    Diet Order Diet: Regular       Medical Decision Making and Plan:  CVA:  - Patient with L MCA stroke on 1/6. Etiology likely Unclear . Received NO intervention  - Continue ASA and statin for secondary stroke prophylaxis.  -Zio patch     Factor V leiden  -has not been on anticoagulation for 10 years  -bilateral  US to rule out blood clots negative on 2/11     Vertigo  -PT to test BPPV  -Meclizine 12.5mg BID     CAD  -Pavix     COPD  -on 3l at home, currently on 3.5L  -Montelucast     HTN  -Norvasc 10mg daily  Losartan 100mg daily  Lopressor 100mg BID     Lumbar back pain  Flexeril and gabapentin     Cognitive Communicative deficits:  - Patient with significant cognitive deficits due to stroke  - Environmental modifications to decrease excessive stimulation including turning off the lights  - Consider starting neurostimulants such Nuvigil, amantadine or methylphenidate  - SLP to evaluate and treat cognitive deficits.   -Neuropsych will follow and  perform testing if/when appropriate.      Hemiparesis:  - Patient with Rdom hemiparesis  - consider starting SSRI per FLAME trial to facilitate post-stroke motor recovery  - Continue aggressive therapies with PT and OT to strengthen and optimize function.     Spasticity:  - Patient at risk ofincreased tone in the affected limbs/muscles  - PT and OT to initiate aggressive stretching, ROM exercises, bracing, splinting, casting and positioning as appropriate.  - If the tone fails to improve with the above conservative measures, consider further intervention with botulinum toxin or phenol injections.     Dysphagia/Nutrition:  - Patient currently on Regular DIET.  - Continue therapies with SLP. SLP to perform swallow evaluation and provide oral motor exercises if necessary.      Neurogenic bladder:  - Timed voids with PVR q4H x3. If PVR > 400mL or if patient is unable to void, straight cath patient.     Neurogenic bowel:  -  Colace, Senna BID on admission  - Goal of 1BM/day.     Circadian Rhythm disorder:   Recommend lights on during the day/off at night, minimize nighttime interruptions as able.     Mood  Anxiety  -Paxil  -atarax PRN     - at risk of adjustment disorder, depression, and anxiety due to functional decline     ID:  - at risk for Urinary tract infection     Skin/Wounds:  - Pressure relief q2h while in bed. Close monitoring for signs of breakdown     Pain:  - Neuroceptic - continue tylenol     DVT prophylaxis: continue lovenox     GI prophylaxis:  On Prilosec 20mg daily, added Pepcid 20mg QHS for Reflux at night      -Follow-up Stroke bridge, heme    ____________________________________    Carlin Powers MD  Physical Medicine & Rehabilitation   Brain Injury Medicine   ____________________________________    Total time:  60 minutes. Time spent included pre-rounding, review of vitals and tests, unit/floor time, face-to-face time with the patient including physical examination, care coordination, counseling of  patient and/or family, ordering medications/procedures/tests, discussion with CM, PT, OT, SLP and/or other healthcare providers, and documentation in the electronic medical record. Topics discussed included dispostion.

## 2024-02-13 NOTE — PROGRESS NOTES
..                                                         NURSING DAILY NOTE    Name: Lorraine Leung   Date of Admission: 2/9/2024   Admitting Diagnosis: Acute ischemic left MCA stroke (HCC)  Attending Physician: Carlin Powers M.d.  Allergies: Patient has no allergy information on record.    Safety  Patient Assist  Min assist  Patient Precautions  Fall Risk  Precaution Comments  [place], pt wears headphones amplifier, O2=3.5L no wean  Bed Transfer Status  Minimal Assist  Toilet Transfer Status   Minimal Assist  Assistive Devices  Rails, Wheelchair push, Wheelchair  Oxygen  Nasal Cannula  Diet/Therapeutic Dining  Current Diet Order   Procedures    Diet Order Diet: Regular     Pill Administration  whole  Agitated Behavioral Scale     ABS Level of Severity       Fall Risk  Has the patient had a fall this admission?   No  Tiffani Hinton Fall Risk Scoring  18, HIGH RISK  Fall Risk Safety Measures  bed alarm, chair alarm, seatbelt alarm, poor balance, and low vision/ hearing    Vitals  Temperature: 37.2 °C (98.9 °F)  Temp src: Oral  Pulse: 77  Respiration: 18  Blood Pressure : 105/60  Blood Pressure MAP (Calculated): 75 MM HG  BP Location: Right, Upper Arm  Patient BP Position: Supine     Oxygen  Pulse Oximetry: 94 %  O2 (LPM): 3  O2 Delivery Device: Nasal Cannula    Bowel and Bladder  Last Bowel Movement  02/10/24  Stool Type     Bowel Device  Diaper  Continent  Bladder: Did not void   Bowel: No movement  Bladder Function  Urine Void (mL):  (Large)  Number of Times Voided: 1  Urine Color: Unable To Evaluate  Number of Times Incontinent of Urine: 0  Straight Catheter: 425 ml  Genitourinary Assessment   Bladder Assessment (WDL):  WDL Except  V[place]la Catheter: Not Applicable  Urinary Elimination: Incontinence  Urine Color: Unable To Evaluate  Number of Bladder Accidents: 1  Total Number of Bladder of Accidents in Last 7 Days: 1  Number of Times Incontinent of Urine: 0  Bladder Device: Diaper (accident due in part to  diaper  that was worn was  2 sizes to large for the patient)  Time Void: Yes  Bladder Scan: Post Void  $ Bladder Scan Results (mL): 70    Skin  Selwyn Score   18  Sensory Interventions   Bed Types: Standard/Trauma Mattress  Skin Preventative Measures: Pillows in Use for Support / Positioning  Moisture Interventions  Moisturizers/Barriers: Barrier Cream      Pain  Pain Rating Scale  0 - No Pain  Pain Location  Head  Pain Location Orientation  Posterior  Pain Interventions   Declines    ADLs    Bathing      Linen Change      Personal Hygiene  Change Octavia Pads, Moist Octavia Wipes, Perineal Care  Chlorhexidine Bath      Oral Care     Teeth/Dentures     Shave     Nutrition Percentage Eaten  Between 50-75% Consumed, Lunch  Environmental Precautions  Treaded Slipper Socks on Patient, Bed in Low Position  Patient Turns/Positioning  Patient Turns Self from Side to Side  Patient Turns Assistance/Tolerance     Bed Positions  Bed Controls On, Bed Locked  Head of Bed Elevated  Less than 30 degrees      Psychosocial/Neurologic Assessment  Psychosocial Assessment  Psychosocial (WDL):  Within Defined Limits  Neurologic Assessment  Neuro (WDL): Exceptions to WDL (neuropathy feet and hands)  Level of Consciousness: Alert  Orientation Level: Oriented X4  Cognition: Appropriate judgement, Follows commands  Speech: Clear  Pupil Assesment: Yes  R Pupil Size (mm): 3  R Pupil Shape / Description: Round  R Pupil Reaction: Brisk  L Pupil Size (mm): 3  L Pupil Shape / Description: Round  L Pupil Reaction: Brisk  RUE Motor Response: Responds to commands  RUE Sensation: Numbness, Tingling  Muscle Strength Right Arm: Good Strength Against Gravity and Moderate Resistance  RLE Motor Response: Responds to commands  RLE Sensation: Numbness, Tingling  Muscle Strength Right Leg: Good Strength Against Gravity and Moderate Resistance  EENT (WDL):  WDL Except    Cardio/Pulmonary Assessment  Edema      Respiratory Breath Sounds  RUL Breath Sounds: Clear  RML Breath  Sounds: Diminished  RLL Breath Sounds: Diminished  KASEY Breath Sounds: Clear  LLL Breath Sounds: Diminished  Cardiac Assessment   Cardiac (WDL):  WDL Except

## 2024-02-13 NOTE — FLOWSHEET NOTE
02/12/24 8256   Events/Summary/Plan   Events/Summary/Plan SpO2 check, Spoke to Torri, Pt's daughter. She states Pt was on 2.0 lpm prior to hospitalization. Daughter agrees we can wean Pt to al lower O2 flow.   Vital Signs   Pulse 80   Respiration 18   Pulse Oximetry 97 %   $ Pulse Oximetry (Spot Check) Yes   Respiratory Assessment   Respiratory Pattern Within Normal Limits   Level of Consciousness Alert   Chest Exam   Work Of Breathing / Effort Within Normal Limits   Oxygen   O2 (LPM) 3.5  (Decrease to 3.0 lpm)   O2 Delivery Device Nasal Cannula

## 2024-02-13 NOTE — PROGRESS NOTES
NURSING DAILY NOTE    Name: Lorraine Leung   Date of Admission: 2/9/2024   Admitting Diagnosis: Acute ischemic left MCA stroke (HCC)  Attending Physician: Carlin Powers M.d.  Allergies: Patient has no allergy information on record.    Safety  Patient Assist  Min assist  Patient Precautions  Fall Risk  Precaution Comments  [place], pt wears headphones amplifier, O2=3.5L no wean  Bed Transfer Status  Minimal Assist  Toilet Transfer Status   Minimal Assist  Assistive Devices  Rails, Wheelchair push, Wheelchair  Oxygen  Nasal Cannula  Diet/Therapeutic Dining  Current Diet Order   Procedures    Diet Order Diet: Regular     Pill Administration  whole  Agitated Behavioral Scale     ABS Level of Severity       Fall Risk  Has the patient had a fall this admission?   No  Tiffani Hinton Fall Risk Scoring  18, HIGH RISK  Fall Risk Safety Measures  bed alarm, poor balance, and low vision/ hearing    Vitals  Temperature: 36.6 °C (97.8 °F)  Temp src: Oral  Pulse: 80  Respiration: 18  Blood Pressure : 124/80  Blood Pressure MAP (Calculated): 95 MM HG  BP Location: Right, Upper Arm  Patient BP Position: Supine     Oxygen  Pulse Oximetry: 97 %  O2 (LPM): 3.5 (Decrease to 3.0 lpm)  O2 Delivery Device: Nasal Cannula    Bowel and Bladder  Last Bowel Movement  02/10/24  Stool Type     Bowel Device  Diaper  Continent  Bladder: Did not void   Bowel: No movement  Bladder Function  Urine Void (mL):  (Large)  Number of Times Voided: 1  Urine Color: Unable To Evaluate  Number of Times Incontinent of Urine: 0  Straight Catheter: 425 ml  Genitourinary Assessment   Bladder Assessment (WDL):  WDL Except  V[place]la Catheter: Not Applicable  Urine Color: Unable To Evaluate  Number of Bladder Accidents: 1  Total Number of Bladder of Accidents in Last 7 Days: 1  Number of Times Incontinent of Urine: 0  Bladder Device: Diaper (accident due in part to  diaper that was worn was  2 sizes to large for the  patient)  Time Void: Yes  Bladder Scan:  (incont)  $ Bladder Scan Results (mL): 58    Skin  Selwyn Score   18  Sensory Interventions   Bed Types: Standard/Trauma Mattress  Skin Preventative Measures: Pillows in Use for Support / Positioning  Moisture Interventions  Moisturizers/Barriers: Barrier Cream      Pain  Pain Rating Scale  0 - No Pain  Pain Location  Head  Pain Location Orientation  Posterior  Pain Interventions   Medication (see MAR)    ADLs    Bathing      Linen Change      Personal Hygiene  Change Octavia Pads, Moist Octavia Wipes, Perineal Care  Chlorhexidine Bath      Oral Care     Teeth/Dentures     Shave     Nutrition Percentage Eaten  Between 50-75% Consumed, Lunch  Environmental Precautions  Treaded Slipper Socks on Patient, Bed in Low Position  Patient Turns/Positioning  Patient Turns Self from Side to Side  Patient Turns Assistance/Tolerance     Bed Positions  Bed Controls On, Bed Locked  Head of Bed Elevated  Less than 30 degrees      Psychosocial/Neurologic Assessment  Psychosocial Assessment  Psychosocial (WDL):  Within Defined Limits  Neurologic Assessment  Neuro (WDL): Exceptions to WDL  Level of Consciousness: Alert  Orientation Level: Oriented X4  Cognition: Appropriate judgement, Follows commands  Speech: Clear  Pupil Assesment: Yes  R Pupil Size (mm): 3  R Pupil Shape / Description: Round  R Pupil Reaction: Brisk  L Pupil Size (mm): 3  L Pupil Shape / Description: Round  L Pupil Reaction: Brisk  RUE Motor Response: Responds to commands  RUE Sensation: Numbness, Tingling  Muscle Strength Right Arm: Fair Strength against Gravity but No Resistance  RLE Motor Response: Responds to commands  RLE Sensation: Numbness, Tingling  Muscle Strength Right Leg: Fair Strength against Gravity but No Resistance  EENT (WDL):  WDL Except    Cardio/Pulmonary Assessment  Edema      Respiratory Breath Sounds  RUL Breath Sounds: Clear  RML Breath Sounds: Diminished  RLL Breath Sounds: Diminished  KASEY Breath Sounds:  Clear  LLL Breath Sounds: Diminished  Cardiac Assessment   Cardiac (WDL):  WDL Except

## 2024-02-13 NOTE — FLOWSHEET NOTE
02/13/24 1517   Events/Summary/Plan   Events/Summary/Plan SpO2 check   Vital Signs   Pulse 70   Respiration 16   Pulse Oximetry 98 %   $ Pulse Oximetry (Spot Check) Yes   Oxygen   O2 (LPM) 2   O2 Delivery Device Nasal Cannula

## 2024-02-13 NOTE — FLOWSHEET NOTE
02/13/24 1505   Events/Summary/Plan   Events/Summary/Plan SpO2 check   Vital Signs   Pulse 70   Respiration 16   Pulse Oximetry 98 %  (resting in bed)   $ Pulse Oximetry (Spot Check) Yes   Respiratory Assessment   Respiratory Pattern Within Normal Limits   Oxygen   O2 (LPM) 3  (decrease to 2 lpm)   O2 Delivery Device Nasal Cannula

## 2024-02-14 ENCOUNTER — APPOINTMENT (OUTPATIENT)
Dept: PHYSICAL THERAPY | Facility: REHABILITATION | Age: 79
DRG: 056 | End: 2024-02-14
Attending: PHYSICAL MEDICINE & REHABILITATION
Payer: MEDICARE

## 2024-02-14 ENCOUNTER — APPOINTMENT (OUTPATIENT)
Dept: SPEECH THERAPY | Facility: REHABILITATION | Age: 79
DRG: 056 | End: 2024-02-14
Attending: PHYSICAL MEDICINE & REHABILITATION
Payer: MEDICARE

## 2024-02-14 ENCOUNTER — APPOINTMENT (OUTPATIENT)
Dept: OCCUPATIONAL THERAPY | Facility: REHABILITATION | Age: 79
DRG: 056 | End: 2024-02-14
Attending: PHYSICAL MEDICINE & REHABILITATION
Payer: MEDICARE

## 2024-02-14 VITALS
HEART RATE: 74 BPM | HEIGHT: 66 IN | RESPIRATION RATE: 16 BRPM | SYSTOLIC BLOOD PRESSURE: 125 MMHG | WEIGHT: 138.89 LBS | OXYGEN SATURATION: 96 % | BODY MASS INDEX: 22.32 KG/M2 | DIASTOLIC BLOOD PRESSURE: 63 MMHG | TEMPERATURE: 97.8 F

## 2024-02-14 PROCEDURE — 700111 HCHG RX REV CODE 636 W/ 250 OVERRIDE (IP): Mod: JZ | Performed by: PHYSICAL MEDICINE & REHABILITATION

## 2024-02-14 PROCEDURE — 92526 ORAL FUNCTION THERAPY: CPT

## 2024-02-14 PROCEDURE — 94760 N-INVAS EAR/PLS OXIMETRY 1: CPT

## 2024-02-14 PROCEDURE — 700102 HCHG RX REV CODE 250 W/ 637 OVERRIDE(OP): Performed by: PHYSICAL MEDICINE & REHABILITATION

## 2024-02-14 PROCEDURE — 97129 THER IVNTJ 1ST 15 MIN: CPT

## 2024-02-14 PROCEDURE — 700111 HCHG RX REV CODE 636 W/ 250 OVERRIDE (IP): Performed by: PHYSICAL MEDICINE & REHABILITATION

## 2024-02-14 PROCEDURE — 770010 HCHG ROOM/CARE - REHAB SEMI PRIVAT*

## 2024-02-14 PROCEDURE — 97130 THER IVNTJ EA ADDL 15 MIN: CPT

## 2024-02-14 PROCEDURE — A9270 NON-COVERED ITEM OR SERVICE: HCPCS | Performed by: PHYSICAL MEDICINE & REHABILITATION

## 2024-02-14 PROCEDURE — 97530 THERAPEUTIC ACTIVITIES: CPT | Mod: CO

## 2024-02-14 RX ORDER — CYCLOBENZAPRINE HCL 10 MG
10 TABLET ORAL 3 TIMES DAILY PRN
Status: DISCONTINUED | OUTPATIENT
Start: 2024-02-14 | End: 2024-02-15

## 2024-02-14 RX ORDER — CYCLOBENZAPRINE HCL 10 MG
10 TABLET ORAL ONCE
Status: DISCONTINUED | OUTPATIENT
Start: 2024-02-14 | End: 2024-02-15

## 2024-02-14 RX ORDER — SULFAMETHOXAZOLE AND TRIMETHOPRIM 800; 160 MG/1; MG/1
1 TABLET ORAL EVERY 12 HOURS
Status: DISPENSED | OUTPATIENT
Start: 2024-02-14 | End: 2024-02-19

## 2024-02-14 RX ORDER — TRAZODONE HYDROCHLORIDE 50 MG/1
50 TABLET ORAL
Status: DISCONTINUED | OUTPATIENT
Start: 2024-02-14 | End: 2024-02-15

## 2024-02-14 RX ORDER — LOSARTAN POTASSIUM 50 MG/1
50 TABLET ORAL
Status: DISCONTINUED | OUTPATIENT
Start: 2024-02-15 | End: 2024-02-23 | Stop reason: HOSPADM

## 2024-02-14 RX ORDER — LANOLIN ALCOHOL/MO/W.PET/CERES
3 CREAM (GRAM) TOPICAL
Status: DISCONTINUED | OUTPATIENT
Start: 2024-02-14 | End: 2024-02-23 | Stop reason: HOSPADM

## 2024-02-14 RX ORDER — PAROXETINE HYDROCHLORIDE 20 MG/1
20 TABLET, FILM COATED ORAL DAILY
Status: DISCONTINUED | OUTPATIENT
Start: 2024-02-15 | End: 2024-02-20

## 2024-02-14 RX ORDER — OLANZAPINE 2.5 MG/1
2.5 TABLET, FILM COATED ORAL EVERY 6 HOURS PRN
Status: DISCONTINUED | OUTPATIENT
Start: 2024-02-14 | End: 2024-02-23 | Stop reason: HOSPADM

## 2024-02-14 RX ORDER — PAROXETINE HYDROCHLORIDE 20 MG/1
20 TABLET, FILM COATED ORAL ONCE
Status: DISPENSED | OUTPATIENT
Start: 2024-02-14 | End: 2024-02-15

## 2024-02-14 RX ADMIN — OMEPRAZOLE 20 MG: 20 CAPSULE, DELAYED RELEASE ORAL at 09:04

## 2024-02-14 RX ADMIN — ASPIRIN 81 MG 81 MG: 81 TABLET ORAL at 09:04

## 2024-02-14 RX ADMIN — Medication 3 MG: at 18:11

## 2024-02-14 RX ADMIN — ATORVASTATIN CALCIUM 40 MG: 40 TABLET, FILM COATED ORAL at 20:07

## 2024-02-14 RX ADMIN — FAMOTIDINE 20 MG: 20 TABLET ORAL at 20:07

## 2024-02-14 RX ADMIN — SENNOSIDES AND DOCUSATE SODIUM 2 TABLET: 50; 8.6 TABLET ORAL at 09:04

## 2024-02-14 RX ADMIN — ENOXAPARIN SODIUM 40 MG: 100 INJECTION SUBCUTANEOUS at 18:11

## 2024-02-14 RX ADMIN — MONTELUKAST 10 MG: 10 TABLET, FILM COATED ORAL at 20:07

## 2024-02-14 RX ADMIN — SULFAMETHOXAZOLE AND TRIMETHOPRIM 1 TABLET: 800; 160 TABLET ORAL at 16:25

## 2024-02-14 RX ADMIN — ZIPRASIDONE MESYLATE 10 MG: 20 INJECTION, POWDER, LYOPHILIZED, FOR SOLUTION INTRAMUSCULAR at 12:25

## 2024-02-14 RX ADMIN — METOPROLOL TARTRATE 100 MG: 25 TABLET, FILM COATED ORAL at 18:11

## 2024-02-14 RX ADMIN — CLOPIDOGREL BISULFATE 75 MG: 75 TABLET ORAL at 09:04

## 2024-02-14 RX ADMIN — TRAZODONE HYDROCHLORIDE 50 MG: 50 TABLET ORAL at 20:07

## 2024-02-14 NOTE — DISCHARGE INSTRUCTIONS
Head CT was performed which shows no acute process or bleed laboratory evaluation is unremarkable no elevated white blood cell count no electrolyte abnormality.  Patient is not complaining of dysuria and refused to provide urine sample.  She will be transferred back to her facility for further management

## 2024-02-14 NOTE — PROGRESS NOTES
"This writer received a call from pt's daughter Torri, she was updated by the ER nurse regarding current pt status. Daughter Torri thinks that pt \"just didn't have enough sleep the other night'. Per ER notes they were attempting to obtain a urine specimen but pt refused and forcedly kept legs shut, Torri will either try to go to ER or call pt to convince her to have a urine specimen obtained via ICP.  "

## 2024-02-14 NOTE — ED NOTES
Pt woken up to provide urine sample and pt states she cannot provide one  Offered pt to straight cath and pt refused, offered to assist to BS commode and pt states ''I am just going back to sleep''    ERP notified and at BS- Pt agreeable to straight cath w/ ERP

## 2024-02-14 NOTE — PROGRESS NOTES
"  Physical Medicine & Rehabilitation Progress Note    Encounter Date: 2/14/2024    Chief Complaint: Weakness    Interval Events (Subjective):  Seen in bed. Very confused. She required Geodon last night. CT head stable. Will work on UA and medical management. Hold therapy due to worsening agitation and danger to staff    Objective:  VITAL SIGNS: /74   Pulse 82   Temp 36.5 °C (97.7 °F) (Oral)   Resp 16   Ht 1.676 m (5' 6\")   Wt 63 kg (138 lb 14.2 oz)   SpO2 98%   BMI 22.42 kg/m²   Gen: No acute distress, well developed well nourished adult  HEENT: Normal Cephalic Atraumatic, Normal conjunctiva.   CV: warm extremities, well perfused, no edema  Resp: symmetric chest rise, breathing comfortably on room air  Abd: Soft, Non distended  Extremities: normal bulk, no atrophy  Skin: no visible rashes or lesions.   Neuro: alert, awake  Psych: Mood and affect appropriate and congruent    Laboratory Values:  Recent Results (from the past 72 hour(s))   CBC WITHOUT DIFFERENTIAL    Collection Time: 02/13/24  6:08 AM   Result Value Ref Range    WBC 9.0 4.8 - 10.8 K/uL    RBC 3.49 (L) 4.20 - 5.40 M/uL    Hemoglobin 10.7 (L) 12.0 - 16.0 g/dL    Hematocrit 32.8 (L) 37.0 - 47.0 %    MCV 94.0 81.4 - 97.8 fL    MCH 30.7 27.0 - 33.0 pg    MCHC 32.6 32.2 - 35.5 g/dL    RDW 41.4 35.9 - 50.0 fL    Platelet Count 276 164 - 446 K/uL    MPV 9.6 9.0 - 12.9 fL   Comp Metabolic Panel    Collection Time: 02/13/24  6:08 AM   Result Value Ref Range    Sodium 140 135 - 145 mmol/L    Potassium 3.9 3.6 - 5.5 mmol/L    Chloride 104 96 - 112 mmol/L    Co2 26 20 - 33 mmol/L    Anion Gap 10.0 7.0 - 16.0    Glucose 89 65 - 99 mg/dL    Bun 16 8 - 22 mg/dL    Creatinine 0.69 0.50 - 1.40 mg/dL    Calcium 8.3 (L) 8.5 - 10.5 mg/dL    Correct Calcium 8.9 8.5 - 10.5 mg/dL    AST(SGOT) 15 12 - 45 U/L    ALT(SGPT) 11 2 - 50 U/L    Alkaline Phosphatase 118 (H) 30 - 99 U/L    Total Bilirubin 0.3 0.1 - 1.5 mg/dL    Albumin 3.2 3.2 - 4.9 g/dL    Total Protein " 6.4 6.0 - 8.2 g/dL    Globulin 3.2 1.9 - 3.5 g/dL    A-G Ratio 1.0 g/dL   ESTIMATED GFR    Collection Time: 02/13/24  6:08 AM   Result Value Ref Range    GFR (CKD-EPI) 89 >60 mL/min/1.73 m 2   CBC WITH DIFFERENTIAL    Collection Time: 02/13/24 11:22 PM   Result Value Ref Range    WBC 7.9 4.8 - 10.8 K/uL    RBC 3.78 (L) 4.20 - 5.40 M/uL    Hemoglobin 11.6 (L) 12.0 - 16.0 g/dL    Hematocrit 35.2 (L) 37.0 - 47.0 %    MCV 93.1 81.4 - 97.8 fL    MCH 30.7 27.0 - 33.0 pg    MCHC 33.0 32.2 - 35.5 g/dL    RDW 41.4 35.9 - 50.0 fL    Platelet Count 268 164 - 446 K/uL    MPV 9.2 9.0 - 12.9 fL    Neutrophils-Polys 59.70 44.00 - 72.00 %    Lymphocytes 24.90 22.00 - 41.00 %    Monocytes 7.20 0.00 - 13.40 %    Eosinophils 7.10 (H) 0.00 - 6.90 %    Basophils 0.80 0.00 - 1.80 %    Immature Granulocytes 0.30 0.00 - 0.90 %    Nucleated RBC 0.00 0.00 - 0.20 /100 WBC    Neutrophils (Absolute) 4.71 1.82 - 7.42 K/uL    Lymphs (Absolute) 1.96 1.00 - 4.80 K/uL    Monos (Absolute) 0.57 0.00 - 0.85 K/uL    Eos (Absolute) 0.56 (H) 0.00 - 0.51 K/uL    Baso (Absolute) 0.06 0.00 - 0.12 K/uL    Immature Granulocytes (abs) 0.02 0.00 - 0.11 K/uL    NRBC (Absolute) 0.00 K/uL   BASIC METABOLIC PANEL    Collection Time: 02/13/24 11:22 PM   Result Value Ref Range    Sodium 140 135 - 145 mmol/L    Potassium 3.7 3.6 - 5.5 mmol/L    Chloride 104 96 - 112 mmol/L    Co2 24 20 - 33 mmol/L    Glucose 98 65 - 99 mg/dL    Bun 21 8 - 22 mg/dL    Creatinine 0.89 0.50 - 1.40 mg/dL    Calcium 8.6 8.5 - 10.5 mg/dL    Anion Gap 12.0 7.0 - 16.0   ESTIMATED GFR    Collection Time: 02/13/24 11:22 PM   Result Value Ref Range    GFR (CKD-EPI) 67 >60 mL/min/1.73 m 2       Medications:  Scheduled Medications   Medication Dose Frequency    [START ON 2/15/2024] PARoxetine  20 mg DAILY    traZODone  50 mg QHS    [START ON 2/15/2024] losartan  50 mg Q DAY    melatonin  3 mg AFTER DINNER    famotidine  20 mg Q EVENING    amLODIPine  10 mg Q DAY    aspirin  81 mg DAILY     atorvastatin  40 mg Q EVENING    clopidogrel  75 mg DAILY    metoprolol tartrate  100 mg BID    montelukast  10 mg Nightly    Pharmacy Consult Request  1 Each PHARMACY TO DOSE    senna-docusate  2 Tablet BID    omeprazole  20 mg DAILY    enoxaparin (LOVENOX) injection  40 mg DAILY AT 1800     PRN medications: OLANZapine, ziprasidone, acetaminophen, Respiratory Therapy Consult, hydrALAZINE, senna-docusate **AND** polyethylene glycol/lytes, ondansetron **OR** ondansetron, sodium chloride    Diet:  Current Diet Order   Procedures    Diet Order Diet: Regular       Medical Decision Making and Plan:  CVA:  - Patient with L MCA stroke on 1/6. Etiology likely Unclear . Received NO intervention  - Continue ASA and statin for secondary stroke prophylaxis.  -Zio patch     Factor V leiden  -has not been on anticoagulation for 10 years  -bilateral  US to rule out blood clots negative on 2/11     Vertigo  -PT to test BPPV  -Meclizine 12.5mg BID     CAD  -Pavix     COPD  -on 3l at home, currently on 3.5L  -Montelucast     HTN  -Norvasc 10mg daily  Losartan 100mg daily  -2/14- decrease Losartan to 50mg daily due to hypotension and poor po intake  Lopressor 100mg BID     Lumbar back pain  Flexeril and gabapentin  -2/14- DC flexeril and gabapentin due to delerium     Cognitive Communicative deficits:  - Patient with significant cognitive deficits due to stroke  - Environmental modifications to decrease excessive stimulation including turning off the lights  - Consider starting neurostimulants such Nuvigil, amantadine or methylphenidate  - SLP to evaluate and treat cognitive deficits.   -Neuropsych will follow and perform testing if/when appropriate.      Hemiparesis:  - Patient with Rdom hemiparesis  - consider starting SSRI per FLAME trial to facilitate post-stroke motor recovery  - Continue aggressive therapies with PT and OT to strengthen and optimize function.     Spasticity:  - Patient at risk ofincreased tone in the affected  limbs/muscles  - PT and OT to initiate aggressive stretching, ROM exercises, bracing, splinting, casting and positioning as appropriate.  - If the tone fails to improve with the above conservative measures, consider further intervention with botulinum toxin or phenol injections.     Dysphagia/Nutrition:  - Patient currently on Regular DIET.  - Continue therapies with SLP. SLP to perform swallow evaluation and provide oral motor exercises if necessary.      Neurogenic bladder:  - Timed voids with PVR q4H x3. If PVR > 400mL or if patient is unable to void, straight cath patient.     Neurogenic bowel:  -  Colace, Senna BID on admission  - Goal of 1BM/day.     Circadian Rhythm disorder:   Recommend lights on during the day/off at night, minimize nighttime interruptions as able.     Mood  Anxiety  -Paxil  -2/14-Wean off Paxil due to delerium. 20mg daily  -atarax PRN     - at risk of adjustment disorder, depression, and anxiety due to functional decline     ID:  - at risk for Urinary tract infection     Skin/Wounds:  - Pressure relief q2h while in bed. Close monitoring for signs of breakdown     Pain:  - Neuroceptic - continue tylenol     DVT prophylaxis: continue lovenox     GI prophylaxis:  On Prilosec 20mg daily, added Pepcid 20mg QHS for Reflux at night      -Follow-up Stroke bridge, heme    ____________________________________    Carlin Powers MD  Physical Medicine & Rehabilitation   Brain Injury Medicine   ____________________________________     20

## 2024-02-14 NOTE — ED PROVIDER NOTES
"ED Provider Note    CHIEF COMPLAINT  Chief Complaint   Patient presents with    Agitation       EXTERNAL RECORDS REVIEWED  Other [facility] -Notes from today the patient has a history of a left SHANDRA stroke and factor V deficiency with right-sided deficits.  This evening again she became verbally combative and refusing care with staff and so was treated with Geodon.  Was transferred here for concern for worsening confusion not being would recognize family and a head CT was requested and ordered by the outside physician    Rehab physician note from earlier today says she does have neurocognitive deficits but is alert and oriented x 3    HPI/ROS  LIMITATION TO HISTORY   Select: : None  OUTSIDE HISTORIAN(S):  EMS glucose was normal she is at her baseline GCS of 15 with right-sided deficits    Lorraine Leung is a 78 y.o. female who presents to the emergency department.  She states she feels tired.  She states this evening all she wanted to do was call her daughter but they did not allow her to do so.  She states she was denied multiple times and thus became agitated and \"I lost it\".  She does want herself she does want hurt anybody else.  She states she is does have right-sided weakness which is unchanged from her prior stroke.  She is able to recall where she is where she had been before has no complaints at this time no chest pain or shortness of breath.  She is on her baseline 3.5 L without any respiratory distress.  She states they did straight catheter earlier this week for little bit of urinary retention but denies any dysuria or retention at this time.    PAST MEDICAL HISTORY   has a past medical history of Anxiety, CAD (coronary artery disease), Factor V deficiency (HCC), Hypoxia, Stroke (HCC), and Vertigo.    SURGICAL HISTORY  patient denies any surgical history    FAMILY HISTORY  History reviewed. No pertinent family history.    SOCIAL HISTORY  Social History     Tobacco Use    Smoking status: Former     " "Current packs/day: 0.00     Types: Cigarettes     Quit date: 1990     Years since quittin.1     Passive exposure: Never    Smokeless tobacco: Not on file   Vaping Use    Vaping Use: Not on file   Substance and Sexual Activity    Alcohol use: Never    Drug use: Never    Sexual activity: Not on file       CURRENT MEDICATIONS  Home Medications       Reviewed by Korey Harvey R.N. (Registered Nurse) on 24 at 2250  Med List Status: <None>     Medication Last Dose Status        Patient Rd Taking any Medications                           ALLERGIES  No Known Allergies    PHYSICAL EXAM  VITAL SIGNS: /69   Pulse 78   Temp 36.3 °C (97.3 °F) (Temporal)   Resp 16   Ht 1.676 m (5' 6\")   Wt 63 kg (138 lb 14.2 oz)   SpO2 88%   BMI 22.42 kg/m²    Pulse OX: Pulse Oxygen level is     Constitutional: Alert in no apparent distress.  HENT: Normocephalic, Atraumatic, MMM  Eyes: PERound. Conjunctiva normal, non-icteric.   Heart: Regular rate and rhythm, intact distal pulses   Lungs: Symmetrical movement, no resp distress   Abdomen: Non-tender, non-distended, normal bowel sounds  EXT/Back no edema  Skin: Warm, Dry, No erythema, No rash.   Neurologic: Alert and oriented x 3, right-sided residual weakness mild facial droop she does a strength of the right upper extremity and diminished right lower extremity no dysarthria      DIAGNOSTIC STUDIES / PROCEDURES      LABS  Labs Reviewed   CBC WITH DIFFERENTIAL - Abnormal; Notable for the following components:       Result Value    RBC 3.78 (*)     Hemoglobin 11.6 (*)     Hematocrit 35.2 (*)     Eosinophils 7.10 (*)     Eos (Absolute) 0.56 (*)     All other components within normal limits   BASIC METABOLIC PANEL   ESTIMATED GFR         RADIOLOGY  I have independently interpreted the diagnostic imaging associated with this visit and am waiting the final reading from the radiologist.   My preliminary interpretation is as follows:     CT head - Prior L sided " stroke without bleed    Radiologist interpretation:     Ct head - 1.  No acute intracranial abnormality is identified, there are nonspecific white matter changes, commonly associated with small vessel ischemic disease.  Associated mild cerebral atrophy is noted.  2.  Atherosclerosis.       COURSE & MEDICAL DECISION MAKING    ED Observation Status? No; Patient does not meet criteria for ED Observation.     INITIAL ASSESSMENT, COURSE AND PLAN  Care Narrative:     Patient is 78-year-old female was transferred here head CT was ordered by the physician at rehab and was already done prior to my evaluation of the patient.  Because it was not ordered by myself I called our film room who states the images already been dictated but I cannot access the read at this time.  I do not appreciate any acute changes.  Will evaluate for electrolyte abnormality or UTI but otherwise patient is alert oriented and denies any new concerning weakness numbness or difficulty speaking.    DISPOSITION AND DISCUSSIONS    11:51 PM  Still have no read for the CT head - called film room again     4:53 AM  I had requested the patient give us urine sample she is being pretty obstinate.  She denies that she has painful urination she does have a little bit of retention based on her prior notes but does not have an elevated white blood cell count does not have a left shift she is not complaining of painful urination.  There is no evidence of acute bleed on her head CT no left shift no electrolyte abnormality.  She is going to be discharged back to her rehab facility.        I have discussed management of the patient with the following physicians and NGUYEN's:  none    Discussion of management with other QHP or appropriate source(s): None     Escalation of care considered, and ultimately not performed:acute inpatient care management, however at this time, the patient is most appropriate for outpatient management    Barriers to care at this time, including  but not limited to:  na .     Decision tools and prescription drugs considered including, but not limited to: Antibiotics not indicated .    The patient will return for new or worsening symptoms and is stable at the time of discharge.    DISPOSITION:  Patient will be discharged back to rehab facility     FOLLOW UP:  [facility], Emergency Dept  [address] [id number] [phone]    If symptoms worsen      OUTPATIENT MEDICATIONS:  New Prescriptions    No medications on file         FINAL DIAGNOSIS  1. Agitation           Electronically signed by: Shirin Vicente M.D., 2/13/2024 11:04 PM

## 2024-02-14 NOTE — WOUND TEAM
Unable to assess patient today, she is having increased confusion and agitation. Family and RN at bedside.

## 2024-02-14 NOTE — THERAPY
"Occupational Therapy  Daily Treatment     Patient Name: Lorraine Leung  Age:  78 y.o., Sex:  female  Medical Record #: [id number]  Today's Date: 2/14/2024     Precautions  Precautions: Fall Risk  Comments: OH, pt wears headphones amplifier, O2=3.5L no wean    Safety   ADL Safety : Requires Supervision for Safety, Requires Physical Assist for Safety, Requires Cueing for Safety  Bathroom Safety: Requires Supervision for Safety, Requires Physical Assist for Safety, Requires Cuing for Safety    Subjective    \"Do whatever, you're the boss. I don't even want to be here. I am here aganced my will\"      Objective       02/14/24 0931   Therapy Missed   Missed Therapy (Minutes) 30   Reason For Missed Therapy Non-Medical - Patient Refused   OT Charge Group   OT Therapy Activity (Units) 2   OT Total Time Spent   OT Individual Total Time Spent (Mins) 30   Interdisciplinary Plan of Care Collaboration   IDT Collaboration with  Other (See Comments)  (scheduling)   Patient Position at End of Therapy Seated;Chair Alarm On;Call Light within Reach;Tray Table within Reach;Phone within Reach   Collaboration Comments Pt refused to participated       Assessment    Increase time to set up shower for bathing tasks, however when wheeling pt to restroom pt strongly refused tasks. Pt was than agreeable for thera ex, however on the way to pt began to take feet of foot rest and have them drag on floor. Pt was asked to place LE back onto foot rest w/ gentle assistance and pt began to resist assistance and became irritated than refused to participate in anything. Therapist told pt we will head back to RM and pt placed feet back on foot rest than accused therapist of stealing O2 machine that she paid for. Therapist let pt hold machine while getting pushed back to RM.   Strengths: Making steady progress towards goals, Motivated for self care and independence, Pleasant and cooperative, Supportive family, Willingly participates in therapeutic " activities  Barriers: Fatigue, Hemiparesis, Generalized weakness, Hearing impairment, Impaired activity tolerance, Impaired balance, Impaired insight/denial of deficits, Limited mobility    Plan    ADLs, address R sided neglect     DME       Passport items to be completed:  Perform bathroom transfers, complete dressing, complete feeding, get ready for the day, prepare a simple meal, participate in household tasks, adapt home for safety needs, demonstrate home exercise program, complete caregiver training     Occupational Therapy Goals (Active)       Problem: Dressing       Dates: Start:  02/10/24         Goal: STG-Within one week, patient will dress UB at Mod Indep       Dates: Start:  02/10/24            Goal: STG-Within one week, patient will dress LB at Sup via AE/DME PRN       Dates: Start:  02/10/24               Problem: Functional Transfers       Dates: Start:  02/10/24         Goal: STG-Within one week, patient will transfer to toilet at Sup via DME       Dates: Start:  02/10/24               Problem: IADL's       Dates: Start:  02/10/24         Goal: STG-Within one week, patient will prepare a simple meal at Min assist via AE/DME PRN.       Dates: Start:  02/10/24               Problem: OT Long Term Goals       Dates: Start:  02/10/24         Goal: LTG-By discharge, patient will complete basic self care tasks at Sup to Mod Indep via AE/DME PRN.       Dates: Start:  02/10/24            Goal: LTG-By discharge, patient will perform bathroom transfers at Sup via DME.       Dates: Start:  02/10/24               Problem: Toileting       Dates: Start:  02/10/24         Goal: STG-Within one week, patient will complete toileting tasks at Sup via AE/DME PRN - including management of disposable briefs and hygiene.       Dates: Start:  02/10/24

## 2024-02-14 NOTE — ED NOTES
Pt still sleeping in NAD, VSS  Breathing even and unlabored  BS commode ready for when pt is awake and can void

## 2024-02-14 NOTE — ED NOTES
Straight cath attempted and pt would not cooperate, pt forcing her legs shut and telling this RN ''no, I am not doing this''  This RN told pt she just agreed to straight cath w/ ERP and pt continues to refuse, forcing legs shut and not cooperating   Pt offered new brief and states ''no just leave me alone''  ERP notified

## 2024-02-14 NOTE — PROGRESS NOTES
Pt significantly more calm. Up to bathroom, but unable to void. Notified MD. Pt back to bed without problems. Will continue to monitor.

## 2024-02-14 NOTE — PROGRESS NOTES
"At approximately 2130 JOEL Foster went in the pt's room to check on pt and to attempt to time void her, pt suddenly got agitated and yelled at [facility] saying \"I'm not wet, I need to talk to Torri\" --JOEL Foster noticed pt's diaper , pt had an incontinent episode and offered to take her to the toilet and change her diaper and nurse Venegas overheard pt screaming and yelling at [facility], this writer went in the room and pt started yelling again screaming \"help! Help!\" Security called at this point. Per nurse Rubi she has never seen pt acted out like this before and she is completely different from when she had her as a patient.. This writer did not get in report either that pt could get agitated nor combative and confused. When this writer asked the patient \"who is Torri\" (daughter), the pt replied \"You don't know Torri? She's your head\" -- her roommate then clarified \"Torri is her daughter\".  Dr Taylor contacted via Voalte to inform her of pt's sudden change in mentation, stat CT ordered, FRED in the facility at approximately 2215, assessed pt,pt continues to yell down the hallway as she was being wheeled out by [facility], left the facility at 2225.    This writer contacted daughter Torri of above plan of care, Torri agreeable, Torri stated she \"seem very tired today\". Will update family as needed.  "

## 2024-02-14 NOTE — ED NOTES
BS report from Palak RN  Pt resting in NAD, pending results from work-up  Fall risk precautions in place, rails up and bed in low and locked position  Call light in reach, making frequent rounds    Pt drowsy from IM Geodon rcvd at [facility] PTA, pt calm and cooperative     Pt unk if ambulatory d/t deficits from prior stroke, A&O x4, no isolation and 2L NC oxygen  baseline    Needing urine and pt is aware, will alert RN when she can void

## 2024-02-14 NOTE — DISCHARGE PLANNING
Medical Social Work    MSW received a voalte message from bedside RN that pt is ready to return to [facility].  RN to update Rehab about pt returning.  MSW faxed PCS and facesheet to [facility] and made follow up phone call to Chelsey to arrange transport for 0615.  COBRA and transfer packet complete and in pt's chart for transfer.  Bedside RN made aware of transport time.

## 2024-02-14 NOTE — ED NOTES
Transport packet provided from   Approx ETA for [facility] transport is 0615  Pt still sleeping in [facility]

## 2024-02-14 NOTE — PROGRESS NOTES
Daughter at bedside. States patient is highly agitated which is extremely out of character for her. Notified Dr. Powers and orders received. Daughter spoke with Dr. Powers as well. Will monitor.

## 2024-02-14 NOTE — ED NOTES
Pt sleeping in NAD, VSS  Breathing even and unlabored  Frequent rounds being made   POC obs until can obtain urine sample

## 2024-02-14 NOTE — ED TRIAGE NOTES
"Chief Complaint   Patient presents with    Agitation     Patient THEO IBARRA from [facility] for the above complaint. Per EMS, staff at Rehab stated that patient was agitated and acting abnormally and they were concerned that she is having a repeat stroke so she was sent for a CT Head. Patient was given Geodon prior to EMS arrival, dose not listed on MAR sent with patient.     Patient arrived AAO4, GCS 15, calm and cooperative with staff. Patient states that she requested to call her daughter (Torri Rojas) to ask her some questions that she had and that she was denied multiple times leading to her increased agitation. Patient states that staff then escalated and administered medication to her when she attempted to get up to go and call her daughter as she had been requesting.    Patient had a Left MCA stroke resulting in mid to mod assistance with ADL's and max assist for toilet needs, mild right facial droop, significant cognitive deficits, and right dominant hemiparesis per chart sent with patient. No changes noted to patient baseline deficits at this time.    Patient denies any medical complaints or pain at this time.    CT Head ordered per rehab MD PTA.    /69   Pulse 78   Temp 36.3 °C (97.3 °F) (Temporal)   Resp 16   Ht 1.676 m (5' 6\")   Wt 63 kg (138 lb 14.2 oz)   SpO2 88%    "

## 2024-02-14 NOTE — ED NOTES
Attempted PIV placement x2, unsuccessful. Labs drawn and sent.  Patient resting comfortably, resp even and unlabored, NAD, and no needs at this time.  Fall safety precautions in place per policy.

## 2024-02-14 NOTE — PROGRESS NOTES
2219 IM Geodon given per order.Pt is combative with staff, yelling and continues to refused care.  2225 Pt left facility via [facility].

## 2024-02-14 NOTE — FLOWSHEET NOTE
02/14/24 0749   Events/Summary/Plan   Events/Summary/Plan 02 pulse ox check   Vital Signs   Pulse 80   Respiration 16   Pulse Oximetry 96 %   $ Pulse Oximetry (Spot Check) Yes   Respiratory Assessment   Level of Consciousness Alert   Chest Exam   Work Of Breathing / Effort Within Normal Limits   Oxygen   O2 (LPM) 2   O2 Delivery Device Silicone Nasal Cannula

## 2024-02-14 NOTE — THERAPY
Missed Therapy    Patient Name: Lorraine Leung  Age:  78 y.o., Sex:  female  Medical Record #: [id number]  Today's Date: 2/14/2024    Discussed missed therapy with MD and scheduling. Pt agitated and not able to participate in tx. Family requesting to allow pt to rest       02/14/24 1301   Therapy Missed   Missed Therapy (Minutes) 60   Reason For Missed Therapy Medical - Patient on Hold from Therapy;Medical - Patient Agitated;Non-Medical - Patient Family Refused

## 2024-02-14 NOTE — ED NOTES
[facility] at BS  Packet provided, face sheet given, d/c papers w/ packet  Discussed negative/clear results from CT and blood work, pt denies further questions/concerns  D/c back to [facility] in stable condition from ER    Rehab RN supervisor notified

## 2024-02-14 NOTE — ED NOTES
Pt still sleeping in NAD, VSS  Breathing even and unlabored  New brief placed on pt and removed wet one    Pt awaiting [facility] transport back to [facility] at this time

## 2024-02-14 NOTE — THERAPY
"Speech Language Pathology  Daily Treatment     Patient Name: Lorraine Leung  Age:  78 y.o., Sex:  female  Medical Record #: [id number]  Today's Date: 2/14/2024     Precautions  Precautions: Fall Risk  Comments: OH, pt wears headphones amplifier, O2=2L (check with RT on weaning)    Subjective    Pt in room refusing to attend breakfast in the dining room - staring out window despite blinds initially being closed. Pt with significant change in function from last time this SLP worked with patient (Monday) - pt reporting she was a \"prisoner\" and was in the hospital because \"[place] now [place].\" Nursing and physician aware and following.     Objective       02/14/24 0804   Treatment Charges   SLP Swallowing Dysfunction Treatment Swallowing Dysfunction Treatment   SLP Cognitive Skill Development First 15 Minutes 1   SLP Cognitive Skill Development Additional 15 Minutes 1   SLP Total Time Spent   SLP Individual Total Time Spent (Mins) 60   Cognition   Orientation  Severe (2)   ABS (Agitated Behavior Scale)   Agitated Behavior Scale Performed Yes   Short Attention Span, Easy Distractibility, Inability to Concentrate 3   Impulsive, Impatient, Low Tolerance for Pain or Frustration 3   Uncooperative, Resistant to Care, Demanding 4   Violent and/or Threatening Violence Toward People or Property 1   Explosive and/or Unpredictable Anger 3   Rocking, Rubbing, Moaning, Other Self-Stimulating Behavior 1   Pulling at Tubes, Restraints, etc. 1   Wandering from Treatment Area 2   Restlessness, Pacing, Excessive Movement 1   Repetitive Behaviors, Motor and/or Verbal 1   Rapid, Loud or Excessive Talking 1   Sudden Changes of Mood 3   Easily Initiated - Excessive Crying and/or Laughter 1   Self-Abusiveness, Physical and/or Verbal 1   Agitated Behavior Scale Total Score 26   Level of Severity Mild Agitation   Social / Pragmatic Communication   Social / Pragmatic Communication X   Eye Contact Severe (2)   Body Language " "Severe (2)   Attention to Social Cues Severe (2)   Dysphagia    Diet / Liquid Recommendation Regular - Easy to Chew (7);Thin (0)   Nutritional Liquid Intake Rating Scale Non thickened beverages   Nutritional Food Intake Rating Scale Total oral diet with no restrictions   Interdisciplinary Plan of Care Collaboration   IDT Collaboration with  Nursing;Physician;Other (See Comments)  (dietary tech)   Patient Position at End of Therapy Seated;Self Releasing Lap Belt Applied;Call Light within Reach;Tray Table within Reach;Chair Alarm On   Collaboration Comments O2 connected to wall unit on 2L, informed nursing and physician of pt CLOF and change since last session with this SLP         Assessment    Pt refusing to eat any solid foods while SLP in the room, agreeable to consume hot chocolate only. Pt refusing to answer any questions as it pertains to swallow and hx of esophageal issues, pt frequently remarking to SLP questions with paranoia with statements such as \"I won't tell you, you'll just hold it against me\" and \"You're just trying to trap me.\" Redirection and reorientation not helpful. Attempted Orientation log - pt refusing to answer most questions, instead turning head, crossing arms and staring out the window. At one point pt stated \"I am a . I'm up with him (pointing to plane in the roger) and I'm not taking you or her (referring to daughter) either one!\"    The Agitated Behavior Scale (ABS) was developed to assess the nature and extent of agitation during the acute phase of recovery from acquired brain injury. Its primary purpose is to allow serial assessment of agitation by treatment professionals who want objective feedback about the course of a patient's agitation. Serial assessments are particularly important when treatment interventions are being attempted.     ABS (Agitated Behavior Scale)  Agitated Behavior Scale Performed: Yes  Short Attention Span, Easy Distractibility, Inability to Concentrate: " Present to a moderate degree  Impulsive, Impatient, Low Tolerance for Pain or Frustration: Present to a moderate degree  Uncooperative, Resistant to Care, Demanding: Present to an extreme degree  Violent and/or Threatening Violence Toward People or Property: Absent  Explosive and/or Unpredictable Anger: Present to a moderate degree  Rocking, Rubbing, Moaning, Other Self-Stimulating Behavior: Absent  Pulling at Tubes, Restraints, etc.: Absent  Wandering from Treatment Area: Present to a slight degree  Restlessness, Pacing, Excessive Movement: Absent  Repetitive Behaviors, Motor and/or Verbal: Absent  Rapid, Loud or Excessive Talking: Absent  Sudden Changes of Mood: Present to a moderate degree  Easily Initiated - Excessive Crying and/or Laughter: Absent  Self-Abusiveness, Physical and/or Verbal: Absent  Agitated Behavior Scale Total Score: 26  Level of Severity: Mild Agitation      Strengths: Able to follow instructions, Effective communication skills, Independent prior level of function, Good insight into deficits/needs, Willingly participates in therapeutic activities, Pleasant and cooperative  Barriers: Impaired functional cognition, Hearing impairment    Plan    Assess a meal with patient to ensure no need for further dysphagia intervention. Continue to address attention, orientation and recall as appropriate.    Passport items to be completed:  Express basic needs, understand food/liquid recommendations, consistently follow swallow precautions, manage finances, manage medications, arrive to therapy appointments on time, complete daily memory log entries, solve problems related to safety situations, review education related to hospitalization, complete caregiver training     Speech Therapy Problems (Active)       Problem: Memory STGs       Dates: Start:  02/10/24         Goal: STG-Within one week, patient will       Dates: Start:  02/10/24       Description: 1) Individualized goal:  recall novel information  related to rehabilitation using external and internal memory strategies with 80% acc and MIN A.   2) Interventions:  SLP Self Care / ADL Training , SLP Cognitive Skill Development, and SLP Group Treatment          Goal Note filed on 02/13/24 0945 by Khang Vasques MS,CCC-SLP       Continue to target, recently evaluated                  Problem: Problem Solving STGs       Dates: Start:  02/13/24         Goal: STG-Within one week, patient will complete medication management tasks with min A to achieve 80% accuracy.         Dates: Start:  02/13/24            Goal: STG-Within one week, patient will complete financial management tasks with min A to achieve 80% accuracy.         Dates: Start:  02/13/24               Problem: Speech/Swallowing LTGs       Dates: Start:  02/10/24         Goal: LTG-By discharge, patient will safely swallow       Dates: Start:  02/10/24       Description: 1) Individualized goal:  level 7 regular textures and thin liquids with no overt clinical s/sx of aspiration for a safe D/C to PLOF.  2) Interventions:  SLP Swallowing Dysfunction Treatment, SLP Self Care / ADL Training , and SLP Group Treatment             Goal: LTG-By discharge, patient will solve complex problem       Dates: Start:  02/10/24       Description: 1) Individualized goal:  related to health/safety with 80% acc and MOD I for a safe D/C to PLOF.   2) Interventions:  SLP Self Care / ADL Training , SLP Cognitive Skill Development, and SLP Group Treatment                Problem: Swallowing STGs       Dates: Start:  02/10/24         Goal: STG-Within one week, patient will safely swallow       Dates: Start:  02/10/24       Description: 1) Individualized goal:  trials of level 7 regular textures and thin liquids with no overt clinical s/sx of aspiration for 2/2 meals w/ SLP.  2) Interventions:  SLP Swallowing Dysfunction Treatment, SLP Self Care / ADL Training , and SLP Group Treatment          Goal Note filed on 02/13/24 0945 by Khang  MS Layo,CCC-SLP       Per notes it was recommended that pt was seen x1 more meal to assess tolerance.

## 2024-02-14 NOTE — PROGRESS NOTES
On call note       Notified by nursing that patient is confused and combative. Per nursing, patient is typically cooperative and AOx4. This evening patient is not oriented to place, does not recognize her daughter (who visits daily) and patient refusing cares. Staff unable to take vitals and security called.     Reviewed patients chart, patient has no prior episodes of confusion. Patients labs and vitals have been WNL and ronnell ent was previously able to participate with therapy.     Stat CT head ordered.   Seroquel PRN ordered for agitation, IM geodon ordered for severe agitation.    Will await CT head, if normal will plan to reorient patient and utilize prn seroquel and geodon if needed.    Update:   Patient combative with staff, required IM geodon for transport for CT head  CT head done in ED, which was negative for acute changes. Patient unable to cooperative for UA in ED   Repeat labs done in ED WNL.   Patient cleared to return to [facility].     Kiersten Morton DO

## 2024-02-14 NOTE — PROGRESS NOTES
NURSING DAILY NOTE    Name: Lorraine Leung   Date of Admission: 2/9/2024   Admitting Diagnosis: Acute ischemic left MCA stroke (HCC)  Attending Physician: Carlin Powers M.d.  Allergies: Patient has no allergy information on record.    Safety  Patient Assist  Min assist  Patient Precautions  Fall Risk  Precaution Comments  OH, pt wears headphones amplifier, O2=3.5L no wean  Bed Transfer Status  Minimal Assist  Toilet Transfer Status   Contact Guard Assist  Assistive Devices  Rails, Wheelchair  Oxygen  Silicone Nasal Cannula  Diet/Therapeutic Dining  Current Diet Order   Procedures    Diet Order Diet: Regular     Pill Administration  whole  Agitated Behavioral Scale     ABS Level of Severity       Fall Risk  Has the patient had a fall this admission?   No  Tiffani Hinton Fall Risk Scoring  18, HIGH RISK  Fall Risk Safety Measures  bed alarm, poor balance, and low vision/ hearing    Vitals  Temperature: 36.7 °C (98 °F)  Temp src: Oral  Pulse: (!) 104  Respiration: 17  Blood Pressure : 113/75  Blood Pressure MAP (Calculated): 88 MM HG  BP Location: Right, Upper Arm  Patient BP Position: Supine     Oxygen  Pulse Oximetry: 94 %  O2 (LPM): 3  O2 Delivery Device: Silicone Nasal Cannula    Bowel and Bladder  Last Bowel Movement  02/13/24  Stool Type  Type 4: Like a sausage or snake, smooth and soft  Bowel Device  Diaper  Continent  Bladder: Did not void   Bowel: No movement  Bladder Function  Urine Void (mL):  (Large)  Number of Times Voided: 1  Urine Color: Unable To Evaluate  Number of Times Incontinent of Urine: 0  Straight Catheter: 425 ml  Genitourinary Assessment   Bladder Assessment (WDL):  WDL Except  Villela Catheter: Not Applicable  Urinary Elimination: Incontinence  Urine Color: Unable To Evaluate  Number of Bladder Accidents: 1  Total Number of Bladder of Accidents in Last 7 Days: 1  Number of Times Incontinent of Urine: 0  Bladder Device: Diaper (accident  due in part to  diaper that was worn was  2 sizes to large for the patient)  Time Void: Yes  Bladder Scan: Post Void  $ Bladder Scan Results (mL): 70    Skin  Selwyn Score   18  Sensory Interventions   Bed Types: Standard/Trauma Mattress  Skin Preventative Measures: Pillows in Use for Support / Positioning  Moisture Interventions  Moisturizers/Barriers: Barrier Cream      Pain  Pain Rating Scale  3 - Sometimes distracts me  Pain Location  Head, Neck  Pain Location Orientation  Posterior  Pain Interventions   Medication (see MAR), Repositioned, Rest    ADLs    Bathing      Linen Change      Personal Hygiene  Change Octavia Pads, Moist Octavia Wipes, Perineal Care  Chlorhexidine Bath      Oral Care     Teeth/Dentures     Shave     Nutrition Percentage Eaten  Breakfast, 0% Consumed  Environmental Precautions  Treaded Slipper Socks on Patient, Bed in Low Position  Patient Turns/Positioning  Patient Turns Self from Side to Side  Patient Turns Assistance/Tolerance     Bed Positions  Bed Controls On, Bed Locked  Head of Bed Elevated  Less than 30 degrees      Psychosocial/Neurologic Assessment  Psychosocial Assessment  Psychosocial (WDL):  Within Defined Limits  Neurologic Assessment  Neuro (WDL): Exceptions to WDL (neuropathy feet and hands)  Level of Consciousness: Alert  Orientation Level: Oriented X4  Cognition: Appropriate judgement, Follows commands  Speech: Clear  Pupil Assesment: Yes  R Pupil Size (mm): 3  R Pupil Shape / Description: Round  R Pupil Reaction: Brisk  L Pupil Size (mm): 3  L Pupil Shape / Description: Round  L Pupil Reaction: Brisk  RUE Motor Response: Responds to commands  RUE Sensation: Numbness, Tingling  Muscle Strength Right Arm: Good Strength Against Gravity and Moderate Resistance  RLE Motor Response: Responds to commands  RLE Sensation: Numbness, Tingling  Muscle Strength Right Leg: Good Strength Against Gravity and Moderate Resistance  EENT (WDL):  WDL Except    Cardio/Pulmonary Assessment  Edema       Respiratory Breath Sounds  RUL Breath Sounds: Clear  RML Breath Sounds: Diminished  RLL Breath Sounds: Diminished  KASEY Breath Sounds: Clear  LLL Breath Sounds: Diminished  Cardiac Assessment   Cardiac (WDL):  WDL Except

## 2024-02-14 NOTE — ED NOTES
Pt back to sleep in NAD, VSS  SW contacted for [facility] transport back to [facility]

## 2024-02-14 NOTE — PROGRESS NOTES
Family approached this RN stating that patient is complaining of being held hostage and of being in her wheelchair for 18 hrs. States that she needs to void, but had to soil her brief because we wouldn't take her to the bathroom. Pt very rude and condescending to family and staff. Became quite loud, aggressive, and belligerent, attempting to hit this RN when I tried wheeling her into the bathroom. Gave Geodon in an attempt to calm patient enough for a straight cath for UA. Notified Dr. Powers. Will monitor.

## 2024-02-15 ENCOUNTER — APPOINTMENT (OUTPATIENT)
Dept: OCCUPATIONAL THERAPY | Facility: REHABILITATION | Age: 79
DRG: 056 | End: 2024-02-15
Attending: PHYSICAL MEDICINE & REHABILITATION
Payer: MEDICARE

## 2024-02-15 ENCOUNTER — APPOINTMENT (OUTPATIENT)
Dept: SPEECH THERAPY | Facility: REHABILITATION | Age: 79
DRG: 056 | End: 2024-02-15
Attending: PHYSICAL MEDICINE & REHABILITATION
Payer: MEDICARE

## 2024-02-15 ENCOUNTER — APPOINTMENT (OUTPATIENT)
Dept: PHYSICAL THERAPY | Facility: REHABILITATION | Age: 79
DRG: 056 | End: 2024-02-15
Attending: PHYSICAL MEDICINE & REHABILITATION
Payer: MEDICARE

## 2024-02-15 PROCEDURE — 700102 HCHG RX REV CODE 250 W/ 637 OVERRIDE(OP): Performed by: PHYSICAL MEDICINE & REHABILITATION

## 2024-02-15 PROCEDURE — 770010 HCHG ROOM/CARE - REHAB SEMI PRIVAT*

## 2024-02-15 PROCEDURE — A9270 NON-COVERED ITEM OR SERVICE: HCPCS | Performed by: PHYSICAL MEDICINE & REHABILITATION

## 2024-02-15 PROCEDURE — 700111 HCHG RX REV CODE 636 W/ 250 OVERRIDE (IP): Performed by: PHYSICAL MEDICINE & REHABILITATION

## 2024-02-15 PROCEDURE — 99232 SBSQ HOSP IP/OBS MODERATE 35: CPT | Performed by: PHYSICAL MEDICINE & REHABILITATION

## 2024-02-15 RX ORDER — OLANZAPINE 2.5 MG/1
2.5 TABLET, FILM COATED ORAL EVERY EVENING
Status: DISCONTINUED | OUTPATIENT
Start: 2024-02-15 | End: 2024-02-16

## 2024-02-15 RX ORDER — TRAZODONE HYDROCHLORIDE 50 MG/1
50 TABLET ORAL
Status: DISCONTINUED | OUTPATIENT
Start: 2024-02-15 | End: 2024-02-23 | Stop reason: HOSPADM

## 2024-02-15 RX ORDER — ZIPRASIDONE MESYLATE 20 MG/ML
10 INJECTION, POWDER, LYOPHILIZED, FOR SOLUTION INTRAMUSCULAR
Status: DISCONTINUED | OUTPATIENT
Start: 2024-02-15 | End: 2024-02-16

## 2024-02-15 RX ORDER — TRAZODONE HYDROCHLORIDE 100 MG/1
100 TABLET ORAL
Status: DISCONTINUED | OUTPATIENT
Start: 2024-02-15 | End: 2024-02-15

## 2024-02-15 RX ADMIN — OLANZAPINE 2.5 MG: 2.5 TABLET, FILM COATED ORAL at 20:51

## 2024-02-15 RX ADMIN — ONDANSETRON 4 MG: 4 TABLET, ORALLY DISINTEGRATING ORAL at 11:11

## 2024-02-15 RX ADMIN — ZIPRASIDONE MESYLATE 10 MG: 20 INJECTION, POWDER, LYOPHILIZED, FOR SOLUTION INTRAMUSCULAR at 10:48

## 2024-02-15 RX ADMIN — SULFAMETHOXAZOLE AND TRIMETHOPRIM 1 TABLET: 800; 160 TABLET ORAL at 10:00

## 2024-02-15 RX ADMIN — Medication 3 MG: at 18:02

## 2024-02-15 RX ADMIN — SENNOSIDES AND DOCUSATE SODIUM 2 TABLET: 50; 8.6 TABLET ORAL at 20:51

## 2024-02-15 RX ADMIN — METOPROLOL TARTRATE 100 MG: 25 TABLET, FILM COATED ORAL at 18:02

## 2024-02-15 RX ADMIN — ATORVASTATIN CALCIUM 40 MG: 40 TABLET, FILM COATED ORAL at 20:50

## 2024-02-15 RX ADMIN — TRAZODONE HYDROCHLORIDE 50 MG: 50 TABLET ORAL at 20:50

## 2024-02-15 RX ADMIN — ENOXAPARIN SODIUM 40 MG: 100 INJECTION SUBCUTANEOUS at 18:02

## 2024-02-15 RX ADMIN — MONTELUKAST 10 MG: 10 TABLET, FILM COATED ORAL at 20:50

## 2024-02-15 RX ADMIN — SULFAMETHOXAZOLE AND TRIMETHOPRIM 1 TABLET: 800; 160 TABLET ORAL at 20:50

## 2024-02-15 NOTE — DIETARY
"Nutrition services: Day 6 of admit.  Lorraine Leung is a 78 y.o. female with admitting DX of  Acute ischemic left MCA stroke     Pt noted to have diminished PO intake      Assessment:  Height: 167.6 cm (5' 6\")  Weight: 63 kg (138 lb 14.2 oz)  Body mass index is 22.42 kg/m²., BMI classification: WNL  Diet/Intake: regular    Evaluation:   Pt agitated and confused. Recorded PO intake was initially good at %. PO now has dwindled to < 50% of meals. Pt may benefit from supplement such as Ensure for additional nutrition.   Skin: MASD partial thickness wound to coccyx.   Meds: Zyprexa, Paxil, Bactrim DS, Desyrel  Pt sleeping and family not present when RD tried to visit. Pt may benefit from addition of Ensure Compact supplement for additional kcals and protein.     Malnutrition Risk: ASPEN criteria not met but risk noted due to poor PO caused by agitation and confusion    Recommendations/Plan:  Add Ensure Compact to meals TID   Encourage intake of >50%  Document intake of all meals and supplements  as % taken in ADL's to provide interdisciplinary communication across all shifts.   Monitor weight.  Nutrition rep will continue to see patient for ongoing meal and snack preferences.        RD will follow  "

## 2024-02-15 NOTE — PROGRESS NOTES
"  Physical Medicine & Rehabilitation Progress Note    Encounter Date: 2/15/2024    Chief Complaint: Weakness    Interval Events (Subjective):  Seen in bed. Patient aggitated, confused. No PRN given in last 16hrs. Refusing medications. Holding therapy due to aggitation    Objective:  VITAL SIGNS: /84   Pulse 75   Temp 36.7 °C (98 °F) (Oral)   Resp 18   Ht 1.676 m (5' 6\")   Wt 63 kg (138 lb 14.2 oz)   SpO2 93%   BMI 22.42 kg/m²   Gen: No acute distress, well developed well nourished adult  HEENT: Normal Cephalic Atraumatic, Normal conjunctiva.   CV: warm extremities, well perfused, no edema  Resp: symmetric chest rise, breathing comfortably on room air  Abd: Soft, Non distended  Extremities: normal bulk, no atrophy  Skin: no visible rashes or lesions.   Neuro: alert, awake  Psych: Mood and affect appropriate and congruent    Laboratory Values:  Recent Results (from the past 72 hour(s))   CBC WITHOUT DIFFERENTIAL    Collection Time: 02/13/24  6:08 AM   Result Value Ref Range    WBC 9.0 4.8 - 10.8 K/uL    RBC 3.49 (L) 4.20 - 5.40 M/uL    Hemoglobin 10.7 (L) 12.0 - 16.0 g/dL    Hematocrit 32.8 (L) 37.0 - 47.0 %    MCV 94.0 81.4 - 97.8 fL    MCH 30.7 27.0 - 33.0 pg    MCHC 32.6 32.2 - 35.5 g/dL    RDW 41.4 35.9 - 50.0 fL    Platelet Count 276 164 - 446 K/uL    MPV 9.6 9.0 - 12.9 fL   Comp Metabolic Panel    Collection Time: 02/13/24  6:08 AM   Result Value Ref Range    Sodium 140 135 - 145 mmol/L    Potassium 3.9 3.6 - 5.5 mmol/L    Chloride 104 96 - 112 mmol/L    Co2 26 20 - 33 mmol/L    Anion Gap 10.0 7.0 - 16.0    Glucose 89 65 - 99 mg/dL    Bun 16 8 - 22 mg/dL    Creatinine 0.69 0.50 - 1.40 mg/dL    Calcium 8.3 (L) 8.5 - 10.5 mg/dL    Correct Calcium 8.9 8.5 - 10.5 mg/dL    AST(SGOT) 15 12 - 45 U/L    ALT(SGPT) 11 2 - 50 U/L    Alkaline Phosphatase 118 (H) 30 - 99 U/L    Total Bilirubin 0.3 0.1 - 1.5 mg/dL    Albumin 3.2 3.2 - 4.9 g/dL    Total Protein 6.4 6.0 - 8.2 g/dL    Globulin 3.2 1.9 - 3.5 g/dL    " A-G Ratio 1.0 g/dL   ESTIMATED GFR    Collection Time: 02/13/24  6:08 AM   Result Value Ref Range    GFR (CKD-EPI) 89 >60 mL/min/1.73 m 2   CBC WITH DIFFERENTIAL    Collection Time: 02/13/24 11:22 PM   Result Value Ref Range    WBC 7.9 4.8 - 10.8 K/uL    RBC 3.78 (L) 4.20 - 5.40 M/uL    Hemoglobin 11.6 (L) 12.0 - 16.0 g/dL    Hematocrit 35.2 (L) 37.0 - 47.0 %    MCV 93.1 81.4 - 97.8 fL    MCH 30.7 27.0 - 33.0 pg    MCHC 33.0 32.2 - 35.5 g/dL    RDW 41.4 35.9 - 50.0 fL    Platelet Count 268 164 - 446 K/uL    MPV 9.2 9.0 - 12.9 fL    Neutrophils-Polys 59.70 44.00 - 72.00 %    Lymphocytes 24.90 22.00 - 41.00 %    Monocytes 7.20 0.00 - 13.40 %    Eosinophils 7.10 (H) 0.00 - 6.90 %    Basophils 0.80 0.00 - 1.80 %    Immature Granulocytes 0.30 0.00 - 0.90 %    Nucleated RBC 0.00 0.00 - 0.20 /100 WBC    Neutrophils (Absolute) 4.71 1.82 - 7.42 K/uL    Lymphs (Absolute) 1.96 1.00 - 4.80 K/uL    Monos (Absolute) 0.57 0.00 - 0.85 K/uL    Eos (Absolute) 0.56 (H) 0.00 - 0.51 K/uL    Baso (Absolute) 0.06 0.00 - 0.12 K/uL    Immature Granulocytes (abs) 0.02 0.00 - 0.11 K/uL    NRBC (Absolute) 0.00 K/uL   BASIC METABOLIC PANEL    Collection Time: 02/13/24 11:22 PM   Result Value Ref Range    Sodium 140 135 - 145 mmol/L    Potassium 3.7 3.6 - 5.5 mmol/L    Chloride 104 96 - 112 mmol/L    Co2 24 20 - 33 mmol/L    Glucose 98 65 - 99 mg/dL    Bun 21 8 - 22 mg/dL    Creatinine 0.89 0.50 - 1.40 mg/dL    Calcium 8.6 8.5 - 10.5 mg/dL    Anion Gap 12.0 7.0 - 16.0   ESTIMATED GFR    Collection Time: 02/13/24 11:22 PM   Result Value Ref Range    GFR (CKD-EPI) 67 >60 mL/min/1.73 m 2       Medications:  Scheduled Medications   Medication Dose Frequency    traZODone  100 mg QHS    PARoxetine  20 mg DAILY    losartan  50 mg Q DAY    melatonin  3 mg AFTER DINNER    PARoxetine  20 mg Once    cyclobenzaprine  10 mg Once    sulfamethoxazole-trimethoprim  1 Tablet Q12HRS    famotidine  20 mg Q EVENING    amLODIPine  10 mg Q DAY    aspirin  81 mg DAILY     atorvastatin  40 mg Q EVENING    clopidogrel  75 mg DAILY    metoprolol tartrate  100 mg BID    montelukast  10 mg Nightly    Pharmacy Consult Request  1 Each PHARMACY TO DOSE    senna-docusate  2 Tablet BID    omeprazole  20 mg DAILY    enoxaparin (LOVENOX) injection  40 mg DAILY AT 1800     PRN medications: OLANZapine, cyclobenzaprine, ziprasidone, acetaminophen, Respiratory Therapy Consult, hydrALAZINE, senna-docusate **AND** polyethylene glycol/lytes, ondansetron **OR** ondansetron, sodium chloride    Diet:  Current Diet Order   Procedures    Diet Order Diet: Regular       Medical Decision Making and Plan:  CVA:  - Patient with L MCA stroke on 1/6. Etiology likely Unclear . Received NO intervention  - Continue ASA and statin for secondary stroke prophylaxis.  -Zio patch     Factor V leiden  -has not been on anticoagulation for 10 years  -bilateral  US to rule out blood clots negative on 2/11     Vertigo  -PT to test BPPV  -Meclizine 12.5mg BID     CAD  -Pavix     COPD  -on 3l at home, currently on 3.5L  -Montelucast     HTN  -Norvasc 10mg daily  Losartan 100mg daily  -2/14- decrease Losartan to 50mg daily due to hypotension and poor po intake  Lopressor 100mg BID  -2/15- refusing BP medication  -continue norvasc, losartan and lopressor     Lumbar back pain  Flexeril and gabapentin  -2/14- DC flexeril and gabapentin due to delerium  -2/15- restarted flexeril 10mg TID PRN as patient triggered by not getting her muscle relaxor     Cognitive Communicative deficits:  - Patient with significant cognitive deficits due to stroke  - Environmental modifications to decrease excessive stimulation including turning off the lights  - SLP to evaluate and treat cognitive deficits.   -Neuropsych will follow and perform testing if/when appropriate.      Hemiparesis:  - Patient with Rdom hemiparesis  - consider starting SSRI per FLAME trial to facilitate post-stroke motor recovery  - Continue aggressive therapies with PT and OT  to strengthen and optimize function.     Spasticity:  - Patient at risk ofincreased tone in the affected limbs/muscles  - PT and OT to initiate aggressive stretching, ROM exercises, bracing, splinting, casting and positioning as appropriate.  - If the tone fails to improve with the above conservative measures, consider further intervention with botulinum toxin or phenol injections.     Dysphagia/Nutrition:  - Patient currently on Regular DIET.  - Continue therapies with SLP. SLP to perform swallow evaluation and provide oral motor exercises if necessary.      Neurogenic bladder:  - Timed voids with PVR q4H x3. If PVR > 400mL or if patient is unable to void, straight cath patient.     Neurogenic bowel:  -  Colace, Senna BID on admission  - Goal of 1BM/day.     Circadian Rhythm disorder:   Recommend lights on during the day/off at night, minimize nighttime interruptions as able.     Mood  Anxiety  -Paxil  -2/14-Wean off Paxil due to delerium. 20mg daily  -atarax PRN     - at risk of adjustment disorder, depression, and anxiety due to functional decline     ID:  - at risk for Urinary tract infection     Skin/Wounds:  - Pressure relief q2h while in bed. Close monitoring for signs of breakdown     Pain:  - Neuroceptic - continue tylenol     DVT prophylaxis: continue lovenox     GI prophylaxis:  On Prilosec 20mg daily, 2/15 DC pepcid to minimize drug burden      -Follow-up Stroke bridge, heme    ____________________________________    Carlin Powers MD  Physical Medicine & Rehabilitation   Brain Injury Medicine   ____________________________________

## 2024-02-15 NOTE — THERAPY
Missed Therapy    Patient Name: Lorraine Leung  Age:  78 y.o., Sex:  female  Medical Record #: [id number]  Today's Date: 2/15/2024    Discussed missed therapy with daughter, CNA, nursing, physician. Pt with ongoing confusion, delirium. C/o nausea, agreeable to taking Zofran. CNA, dtr and nursing attempting to get patient into soma bed. Medical hold placed.       02/15/24 1104   Therapy Missed   Missed Therapy (Minutes) 60   Reason For Missed Therapy Medical - Patient on Hold from Therapy  (ongoing delirium, nausea)   Interdisciplinary Plan of Care Collaboration   IDT Collaboration with  Certified Nursing Assistant;Family / Caregiver;Nursing;Physician   Patient Position at End of Therapy Seated  (attempting to transfer to soma bed)   Collaboration Comments dtr in room with CNA/nursing. Pt with c/o nausea, ongoing confusion/delirium

## 2024-02-15 NOTE — CARE PLAN
Problem: VTE Prevention  Goal: Patient will remain free from venous thromboembolism (VTE)  Note: Pt is up and walking, participates in therapies, and up to table for all meals.   The patient is Watcher - Medium risk of patient condition declining or worsening    Shift Goals  Clinical Goals: Safety  Patient Goals: Participate in therapy    Progress made toward(s) clinical / shift goals:  no s/s dvt    Patient is not progressing towards the following goals:

## 2024-02-15 NOTE — THERAPY
Missed Therapy    Patient Name: Lorraine Leung  Age:  78 y.o., Sex:  female  Medical Record #: [id number]  Today's Date: 2/15/2024    Discussed missed therapy with RN. Pt not appropriate for PT. Received very lethargic with difficulty maintaining eyes open. Once acknowledging therapist, pt started to become agitated. Pt left to rest due to fluctuating lethargy and agitation       02/15/24 1301   Therapy Missed   Missed Therapy (Minutes) 60   Reason For Missed Therapy Medical - Patient on Hold from Therapy;Medical - Patient Agitated;Medical - Other (Please Comment)  (Pt lethargic then agitated upon awaking)

## 2024-02-15 NOTE — PROGRESS NOTES
Received shift report and assumed care of patient.  Patient awake and stable, but agitated, currently positioned in bed for comfort and safety; call light within reach. Will continue to monitor.

## 2024-02-15 NOTE — CARE PLAN
Problem: Bowel Elimination  Goal: Patient will participate in bowel management program  Note: Pt refused scheduled senna at hs.Education given on the importance of bowel meds.LBM 2/13.Will continue to monitor.

## 2024-02-15 NOTE — PROGRESS NOTES
Daughter at bedside. Pt belligerent, combative, and agitated, but was convinced to take the antibiotic. Remains very suspicious of staff and family. Will monitor.

## 2024-02-15 NOTE — PROGRESS NOTES
NURSING DAILY NOTE    Name: Lorraine Leung   Date of Admission: 2/9/2024   Admitting Diagnosis: Acute ischemic left MCA stroke (HCC)  Attending Physician: Carlin Powers M.d.  Allergies: Patient has no known allergies.    Safety  Patient Assist  min  Patient Precautions  Fall Risk  Precaution Comments  OH, pt wears headphones amplifier, O2=3.5L no wean  Bed Transfer Status  Minimal Assist  Toilet Transfer Status   Contact Guard Assist  Assistive Devices  Rails, Wheelchair  Oxygen  Silicone Nasal Cannula  Diet/Therapeutic Dining  Current Diet Order   Procedures    Diet Order Diet: Regular     Pill Administration  whole  Agitated Behavioral Scale  26  ABS Level of Severity  Mild Agitation    Fall Risk  Has the patient had a fall this admission?   No  Tiffani Hinton Fall Risk Scoring  18, HIGH RISK  Fall Risk Safety Measures  bed alarm, chair alarm, poor balance, and medication for agitation due to potential UTI    Vitals  Temperature: 36.7 °C (98 °F)  Temp src: Oral  Pulse: 83  Respiration: 17  Blood Pressure : 116/79  Blood Pressure MAP (Calculated): 91 MM HG  BP Location: Right, Upper Arm  Patient BP Position: Supine     Oxygen  Pulse Oximetry: 97 %  O2 (LPM): 2  O2 Delivery Device: Silicone Nasal Cannula    Bowel and Bladder  Last Bowel Movement  02/13/24  Stool Type  Type 4: Like a sausage or snake, smooth and soft  Bowel Device  Diaper, Bathroom  Continent  Bladder: Did not void   Bowel: No movement  Bladder Function  Urine Void (mL):  (Large)  Number of Times Voided: 1  Urine Color: Unable To Evaluate  Urine Clarity: Cloudy (Unable to see Through), Sediment  Urine Odor: Malodorous  Number of Times Incontinent of Urine: 0  Straight Catheter: 425 ml  Genitourinary Assessment   Bladder Assessment (WDL):  WDL Except  Villela Catheter: Not Applicable  Urinary Elimination: Incontinence  Urine Color: Unable To Evaluate  Urine Clarity: Cloudy (Unable to see  Through), Sediment  Urine Odor: Malodorous  Number of Bladder Accidents: 1  Total Number of Bladder of Accidents in Last 7 Days: 1  Number of Times Incontinent of Urine: 0  Bladder Device: Bathroom, Diaper  Time Void: Yes  Bladder Scan: Post Void  $ Bladder Scan Results (mL): 70    Skin  Selwyn Score   18  Sensory Interventions   Bed Types: Low Airloss  Skin Preventative Measures: Pillows in Use for Support / Positioning  Moisture Interventions  Moisturizers/Barriers: Barrier Wipes      Pain  Pain Rating Scale  0 - No Pain  Pain Location  Head, Neck  Pain Location Orientation  Posterior  Pain Interventions   Declines    ADLs    Bathing      Linen Change      Personal Hygiene  Change Octavia Pads, Moist Octavia Wipes, Perineal Care  Chlorhexidine Bath      Oral Care     Teeth/Dentures     Shave     Nutrition Percentage Eaten  Lunch, Between 25-50% Consumed  Environmental Precautions  Treaded Slipper Socks on Patient, Personal Belongings, Wastebasket, Call Bell etc. in Easy Reach, Transferred to Stronger Side, Report Given to Other Health Care Providers Regarding Fall Risk, Bed in Low Position  Patient Turns/Positioning  Patient Turns Self from Side to Side  Patient Turns Assistance/Tolerance     Bed Positions  Bed Controls On  Head of Bed Elevated  Self regulated      Psychosocial/Neurologic Assessment  Psychosocial Assessment  Psychosocial (WDL):  WDL Except  Patient Behaviors: Agitated, Hostile, Irritable  Family Behaviors: Family Present  Neurologic Assessment  Neuro (WDL): Exceptions to WDL  Level of Consciousness: Alert  Orientation Level: Oriented X4  Cognition: Appropriate judgement, Follows commands  Speech: Clear  Pupil Assesment: No  R Pupil Size (mm): 3  R Pupil Shape / Description: Round  R Pupil Reaction: Brisk  L Pupil Size (mm): 3  L Pupil Shape / Description: Round  L Pupil Reaction: Brisk  RUE Motor Response: Responds to commands  RUE Sensation: Numbness, Tingling  Muscle Strength Right Arm: Good Strength  Against Gravity and Moderate Resistance  RLE Motor Response: Responds to commands  RLE Sensation: Numbness, Tingling  Muscle Strength Right Leg: Good Strength Against Gravity and Moderate Resistance  EENT (WDL):  WDL Except    Cardio/Pulmonary Assessment  Edema      Respiratory Breath Sounds  RUL Breath Sounds: Clear  RML Breath Sounds: Diminished  RLL Breath Sounds: Diminished  KASEY Breath Sounds: Clear  LLL Breath Sounds: Diminished  Cardiac Assessment   Cardiac (WDL):  WDL Except (hx cad)

## 2024-02-15 NOTE — THERAPY
Missed Therapy    Patient Name: Lorraine Leung  Age:  78 y.o., Sex:  female  Medical Record #: [id number]  Today's Date: 2/15/2024    Discussed missed therapy with Dr. Powers, RN, primary PT/OT, therapy .       02/15/24 0831   Therapy Missed   Missed Therapy (Minutes) 60   Reason For Missed Therapy Medical - Patient on Hold from Therapy;Medical - Patient Agitated  (agitated, confused, paranoid)   Interdisciplinary Plan of Care Collaboration   IDT Collaboration with  Nursing;Physician;Other (See Comments);Physical Therapist;Occupational Therapist  (therapy )   Patient Position at End of Therapy In Bed   Collaboration Comments medical hold per Dr. Powers; CLOF with RN, PT/OT

## 2024-02-15 NOTE — PROGRESS NOTES
Called to speak with sister, Tonie. Let her know of patients continued agitation and refusal of all medications, including metoprolol, plavix, and bactrim. Stated she would call and speak with her daughter to see if she could come coax her into taking the antibiotic.

## 2024-02-15 NOTE — PROGRESS NOTES
Orders received from Dr. Powers and attempted to give patient Zyprexa. Refused to take it. Daughter attempted to convince her as well with no luck. Medicated pt with Geodon. Dina CNA and daughter remain at bedside. Will monitor.

## 2024-02-15 NOTE — PROGRESS NOTES
NURSING DAILY NOTE    Name: Lorraine Leung   Date of Admission: 2/9/2024   Admitting Diagnosis: Acute ischemic left MCA stroke (HCC)  Attending Physician: Carlin Powers M.d.  Allergies: Patient has no known allergies.    Safety  Patient Assist  min  Patient Precautions  Fall Risk  Precaution Comments  OH, pt wears headphones amplifier, O2=3.5L no wean  Bed Transfer Status  Minimal Assist  Toilet Transfer Status   Contact Guard Assist  Assistive Devices  Rails, Wheelchair  Oxygen  Silicone Nasal Cannula  Diet/Therapeutic Dining  Current Diet Order   Procedures    Diet Order Diet: Regular     Pill Administration  whole  Agitated Behavioral Scale  26  ABS Level of Severity  Mild Agitation    Fall Risk  Has the patient had a fall this admission?   No  Tiffani Hinton Fall Risk Scoring  18, HIGH RISK  Fall Risk Safety Measures  bed alarm and chair alarm    Vitals  Temperature: 36.7 °C (98 °F)  Temp src: Oral  Pulse: 83  Respiration: 17  Blood Pressure : 116/79  Blood Pressure MAP (Calculated): 91 MM HG  BP Location: Right, Upper Arm  Patient BP Position: Supine     Oxygen  Pulse Oximetry: 97 %  O2 (LPM): 2  O2 Delivery Device: Silicone Nasal Cannula    Bowel and Bladder  Last Bowel Movement  02/13/24  Stool Type  Type 4: Like a sausage or snake, smooth and soft  Bowel Device  Diaper, Bathroom  Continent  Bladder: Did not void   Bowel: No movement  Bladder Function  Urine Void (mL):  (offered pt. to use bathroom, pt.refused, was sarcastic, non compliant and combatative, replied only with please call if you want to use the bathroom or need anything. Will try again later)  Number of Times Voided: 1  Urine Color: Unable To Evaluate  Urine Clarity: Cloudy (Unable to see Through), Sediment  Urine Odor: Malodorous  Number of Times Incontinent of Urine: 0  Straight Catheter: 425 ml  Wet Diaper Count: 1  Genitourinary Assessment   Bladder Assessment (WDL):  WDL  Except  Villela Catheter: Not Applicable  Urinary Elimination: Incontinence  Urine Color: Unable To Evaluate  Urine Clarity: Cloudy (Unable to see Through), Sediment  Urine Odor: Malodorous  Number of Bladder Accidents: 1  Total Number of Bladder of Accidents in Last 7 Days: 1  Number of Times Incontinent of Urine: 0  Bladder Device: Bathroom, Diaper  Time Void: Yes  Bladder Scan: Post Void  $ Bladder Scan Results (mL): 70    Skin  Selwyn Score   18  Sensory Interventions   Bed Types: Low Airloss  Skin Preventative Measures: Pillows in Use for Support / Positioning  Moisture Interventions  Moisturizers/Barriers: Barrier Wipes      Pain  Pain Rating Scale  0 - No Pain  Pain Location  Head, Neck  Pain Location Orientation  Posterior  Pain Interventions   Declines    ADLs    Bathing      Linen Change      Personal Hygiene  Change Octavia Pads, Moist Octavia Wipes, Perineal Care  Chlorhexidine Bath      Oral Care     Teeth/Dentures     Shave     Nutrition Percentage Eaten  Lunch, Between 25-50% Consumed  Environmental Precautions  Treaded Slipper Socks on Patient, Personal Belongings, Wastebasket, Call Bell etc. in Easy Reach, Transferred to Stronger Side, Report Given to Other Health Care Providers Regarding Fall Risk, Bed in Low Position  Patient Turns/Positioning  Patient Turns Self from Side to Side  Patient Turns Assistance/Tolerance     Bed Positions  Bed Controls On  Head of Bed Elevated  Self regulated      Psychosocial/Neurologic Assessment  Psychosocial Assessment  Psychosocial (WDL):  WDL Except  Patient Behaviors: Agitated, Hostile, Irritable  Family Behaviors: Family Present  Neurologic Assessment  Neuro (WDL): Exceptions to WDL  Level of Consciousness: Alert  Orientation Level: Oriented X4  Cognition: Appropriate judgement, Follows commands  Speech: Clear  Pupil Assesment: No  R Pupil Size (mm): 3  R Pupil Shape / Description: Round  R Pupil Reaction: Brisk  L Pupil Size (mm): 3  L Pupil Shape / Description:  Round  L Pupil Reaction: Brisk  RUE Motor Response: Responds to commands  RUE Sensation: Numbness, Tingling  Muscle Strength Right Arm: Good Strength Against Gravity and Moderate Resistance  RLE Motor Response: Responds to commands  RLE Sensation: Numbness, Tingling  Muscle Strength Right Leg: Good Strength Against Gravity and Moderate Resistance  EENT (WDL):  WDL Except    Cardio/Pulmonary Assessment  Edema      Respiratory Breath Sounds  RUL Breath Sounds: Clear  RML Breath Sounds: Diminished  RLL Breath Sounds: Diminished  KASEY Breath Sounds: Clear  LLL Breath Sounds: Diminished  Cardiac Assessment   Cardiac (WDL):  WDL Except (hx cad)

## 2024-02-15 NOTE — PROGRESS NOTES
"0112 Pt asked for tylenol and flexeril, this writer asked what is her pain level, \" it's none of your business, forget it I don't want it anymore \" verbalized by the pt.Explained to the pt the reason why we need to document to no avail and pt yelled \" go away, I don't want you to get into my business\".  0300 Pt is incontinent of bladder, adamantly refused to be cleaned and changed.  0540 Pt adamantly refused scheduled medications.Education given on the importance of medications to no avail. \" You guys are really something else, get out of here\" verbalized by pt.  "

## 2024-02-15 NOTE — PROGRESS NOTES
Orders received for PO antibiotics. Medicated and patient took without problems. Will continue to monitor.

## 2024-02-15 NOTE — PROGRESS NOTES
"Order received for posey bed. Pt extremely reluctant and agitated and refusing to get into the posey. Pt is getting up from the bed, standing from the wheelchair, sitting on the AC unit in the room, removed her brief, and wrote \"Help\" on the window in marker. Daughter wants to give her more time before making her get into the posey. Pt also complains of nausea. Medicated with zofran. Dina MALDONADO remains at bedside.  "

## 2024-02-16 ENCOUNTER — APPOINTMENT (OUTPATIENT)
Dept: SPEECH THERAPY | Facility: REHABILITATION | Age: 79
DRG: 056 | End: 2024-02-16
Attending: PHYSICAL MEDICINE & REHABILITATION
Payer: MEDICARE

## 2024-02-16 ENCOUNTER — APPOINTMENT (OUTPATIENT)
Dept: PHYSICAL THERAPY | Facility: REHABILITATION | Age: 79
DRG: 056 | End: 2024-02-16
Attending: PHYSICAL MEDICINE & REHABILITATION
Payer: MEDICARE

## 2024-02-16 PROCEDURE — 87086 URINE CULTURE/COLONY COUNT: CPT

## 2024-02-16 PROCEDURE — 87186 SC STD MICRODIL/AGAR DIL: CPT

## 2024-02-16 PROCEDURE — 87077 CULTURE AEROBIC IDENTIFY: CPT

## 2024-02-16 PROCEDURE — 81001 URINALYSIS AUTO W/SCOPE: CPT

## 2024-02-16 PROCEDURE — 700111 HCHG RX REV CODE 636 W/ 250 OVERRIDE (IP): Mod: JZ | Performed by: PHYSICAL MEDICINE & REHABILITATION

## 2024-02-16 PROCEDURE — 770010 HCHG ROOM/CARE - REHAB SEMI PRIVAT*

## 2024-02-16 RX ORDER — OLANZAPINE 2.5 MG/1
5 TABLET, FILM COATED ORAL 2 TIMES DAILY
Status: DISCONTINUED | OUTPATIENT
Start: 2024-02-16 | End: 2024-02-19

## 2024-02-16 RX ORDER — OLANZAPINE 2.5 MG/1
5 TABLET, FILM COATED ORAL EVERY EVENING
Status: DISCONTINUED | OUTPATIENT
Start: 2024-02-16 | End: 2024-02-16

## 2024-02-16 RX ORDER — CEFTRIAXONE 1 G/1
1000 INJECTION, POWDER, FOR SOLUTION INTRAMUSCULAR; INTRAVENOUS ONCE
Status: COMPLETED | OUTPATIENT
Start: 2024-02-16 | End: 2024-02-16

## 2024-02-16 RX ORDER — OLANZAPINE 2.5 MG/1
5 TABLET, FILM COATED ORAL 2 TIMES DAILY
Status: DISCONTINUED | OUTPATIENT
Start: 2024-02-16 | End: 2024-02-16

## 2024-02-16 RX ORDER — ZIPRASIDONE MESYLATE 20 MG/ML
5 INJECTION, POWDER, LYOPHILIZED, FOR SOLUTION INTRAMUSCULAR
Status: DISCONTINUED | OUTPATIENT
Start: 2024-02-16 | End: 2024-02-19

## 2024-02-16 RX ADMIN — ZIPRASIDONE MESYLATE 10 MG: 20 INJECTION, POWDER, LYOPHILIZED, FOR SOLUTION INTRAMUSCULAR at 09:38

## 2024-02-16 RX ADMIN — CEFTRIAXONE SODIUM 1000 MG: 1 INJECTION, POWDER, FOR SOLUTION INTRAMUSCULAR; INTRAVENOUS at 12:50

## 2024-02-16 RX ADMIN — ZIPRASIDONE MESYLATE 5 MG: 20 INJECTION, POWDER, LYOPHILIZED, FOR SOLUTION INTRAMUSCULAR at 17:57

## 2024-02-16 RX ADMIN — ZIPRASIDONE MESYLATE 5 MG: 20 INJECTION, POWDER, LYOPHILIZED, FOR SOLUTION INTRAMUSCULAR at 20:57

## 2024-02-16 NOTE — PROGRESS NOTES
Pt up out of posey, to bathroom, unable to void, but brief wet. Daughter at bedside assisting with dinner. Took medication without problems. Pt seems much more pleasant and agreeable at this time.

## 2024-02-16 NOTE — CARE PLAN
"The patient is Watcher - Medium risk of patient condition declining or worsening    Shift Goals  Clinical Goals: Safety  Patient Goals: Participate in therapy    Progress made toward(s) clinical / shift goals:      Problem: Pain - Standard  Goal: Alleviation of pain or a reduction in pain to the patient’s comfort goal  Outcome: Progressing  Note: Pt denies pain or discomfort tonight. Sleeps off and on despite scheduled trazodone. Will continue to monitor.     Patient is not progressing towards the following goals:    Problem: Bladder / Voiding  Goal: Patient will establish and maintain regular urinary output  Outcome: Not Progressing  Note: Pt continent to incontinent of bladder this shift. Unable to collect urine due to pt refusing straight cath. She tends to be agitated when trying to convince her. She said she'll try again later. Will continue to monitor.     Problem: Fall Risk - Rehab  Goal: Patient will remain free from falls  Outcome: Not Progressing  Note: Tiffani Hinton Fall risk Assessment Score: 18    High fall risk Interventions   - Alarming seatbelt  - Bed and strip alarm   - Yellow sign by the door   - Yellow wrist band \"Fall risk\"  - Room near to the nurse station  - Do not leave patient unattended in the bathroom  - Fall risk education provided    Pt remains in soma bed for safety. She does not use call light.         "

## 2024-02-16 NOTE — PROGRESS NOTES
0550-Pt is confused and combative this am. She is wet and refusing to be changed. Unable to do vital signs and give her morning BP meds too due to pt being agitated and combative. Will continue to monitor.

## 2024-02-16 NOTE — PROGRESS NOTES
NURSING DAILY NOTE    Name: Lorraine Leung   Date of Admission: 2/9/2024   Admitting Diagnosis: Acute ischemic left MCA stroke (HCC)  Attending Physician: Carlin Powers M.d.  Allergies: Patient has no known allergies.    Safety  Patient Assist  mod  Patient Precautions  Fall Risk  Precaution Comments  [place], pt wears headphones amplifier, O2=3.5L no wean  Bed Transfer Status  Minimal Assist  Toilet Transfer Status   Contact Guard Assist  Assistive Devices  Rails, Wheelchair  Oxygen  Silicone Nasal Cannula  Diet/Therapeutic Dining  Current Diet Order   Procedures    Diet Order Diet: Regular     Pill Administration  whole  Agitated Behavioral Scale  33  ABS Level of Severity  Moderate Agitation    Fall Risk  Has the patient had a fall this admission?   No  Tiffani Hinton Fall Risk Scoring  18, HIGH RISK  Fall Risk Safety Measures  bed alarm, chair alarm, and posey bed     Vitals  Temperature: 36.3 °C (97.4 °F)  Temp src: Oral  Pulse: 97  Respiration: 18  Blood Pressure : 125/84  Blood Pressure MAP (Calculated): 98 MM HG  BP Location: Right, Upper Arm  Patient BP Position: Sitting     Oxygen  Pulse Oximetry: 91 %  O2 (LPM): 2  O2 Delivery Device: Silicone Nasal Cannula    Bowel and Bladder  Last Bowel Movement  02/13/24  Stool Type  Type 4: Like a sausage or snake, smooth and soft  Bowel Device  Diaper, Bathroom  Continent  Bladder: Did not void   Bowel: No movement  Bladder Function  Urine Void (mL):  (after taking vitals, offered to take pt to bathroom, pt. replied with a sarcastic lara NO!, asked if there was anything i could get for her...another lara NO!)  Number of Times Voided: 1  Urine Color: Unable To Evaluate  Urine Clarity: Cloudy (Unable to see Through), Sediment  Urine Odor: Malodorous  Number of Times Incontinent of Urine: 0  Straight Catheter: 425 ml  Wet Diaper Count: 1  Genitourinary Assessment   Bladder Assessment (WDL):  WDL Except  Villela  Catheter: Not Applicable  Urinary Elimination: Incontinence  Urine Color: Unable To Evaluate  Urine Clarity: Cloudy (Unable to see Through), Sediment  Urine Odor: Malodorous  Number of Bladder Accidents: 1  Total Number of Bladder of Accidents in Last 7 Days: 1  Number of Times Incontinent of Urine: 0  Bladder Device: Bathroom, Diaper  Time Void: Yes  Bladder Scan: Post Void  $ Bladder Scan Results (mL): 70    Skin  Selwyn Score   18  Sensory Interventions   Bed Types: Low Airloss  Skin Preventative Measures: Pillows in Use for Support / Positioning  Moisture Interventions  Moisturizers/Barriers: Barrier Wipes      Pain  Pain Rating Scale  0 - No Pain  Pain Location  Head, Neck  Pain Location Orientation  Posterior  Pain Interventions   Declines    ADLs    Bathing      Linen Change      Personal Hygiene  Change Octavia Pads, Moist Octavia Wipes, Perineal Care  Chlorhexidine Bath      Oral Care     Teeth/Dentures     Shave     Nutrition Percentage Eaten  Lunch, Between 25-50% Consumed  Environmental Precautions  Treaded Slipper Socks on Patient, Personal Belongings, Wastebasket, Call Bell etc. in Easy Reach, Transferred to Stronger Side, Report Given to Other Health Care Providers Regarding Fall Risk, Bed in Low Position  Patient Turns/Positioning  Patient Turns Self from Side to Side  Patient Turns Assistance/Tolerance     Bed Positions  Bed Controls On  Head of Bed Elevated  Self regulated      Psychosocial/Neurologic Assessment  Psychosocial Assessment  Psychosocial (WDL):  WDL Except  Patient Behaviors: Agitated, Hostile, Irritable  Family Behaviors: Family Present  Neurologic Assessment  Neuro (WDL): Exceptions to WDL  Level of Consciousness: Alert  Orientation Level: Oriented X4  Cognition: Appropriate judgement, Follows commands  Speech: Clear  Pupil Assesment: No  R Pupil Size (mm): 3  R Pupil Shape / Description: Round  R Pupil Reaction: Brisk  L Pupil Size (mm): 3  L Pupil Shape / Description: Round  L Pupil  Reaction: Brisk  RUE Motor Response: Responds to commands  RUE Sensation: Numbness, Tingling  Muscle Strength Right Arm: Good Strength Against Gravity and Moderate Resistance  RLE Motor Response: Responds to commands  RLE Sensation: Numbness, Tingling  Muscle Strength Right Leg: Good Strength Against Gravity and Moderate Resistance  EENT (WDL):  WDL Except    Cardio/Pulmonary Assessment  Edema      Respiratory Breath Sounds  RUL Breath Sounds: Clear  RML Breath Sounds: Diminished  RLL Breath Sounds: Diminished  KASEY Breath Sounds: Clear  LLL Breath Sounds: Diminished  Cardiac Assessment   Cardiac (WDL):  WDL Except (hx cad)

## 2024-02-16 NOTE — THERAPY
Missed Therapy    Patient Name: Lorraine Leung  Age:  78 y.o., Sex:  female  Medical Record #: [id number]  Today's Date: 2/16/2024    Discussed missed therapy with CNA, nursing, physician. Ongoing agitation, pt ok for medical hold.       02/16/24 1404   Therapy Missed   Missed Therapy (Minutes) 60   Reason For Missed Therapy Medical - Patient on Hold from Therapy   Interdisciplinary Plan of Care Collaboration   IDT Collaboration with  Certified Nursing Assistant;Nursing;Physician   Patient Position at End of Therapy In Bed   Collaboration Comments CLOF. ongoing agitation, medical hold placed

## 2024-02-16 NOTE — THERAPY
Missed Therapy    Patient Name: Lorraine Leung  Age:  78 y.o., Sex:  female  Medical Record #: [id number]  Today's Date: 2/16/2024    Discussed missed therapy with Dr. Powers, RN Khushbu, PT Min, and .       02/16/24 0901   Therapy Missed   Missed Therapy (Minutes) 30   Reason For Missed Therapy Medical - Patient on Hold from Therapy;Medical - Patient Agitated  (Pt on hold due to  agitation, screaming, and combative behavior)
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
## 2024-02-16 NOTE — PROGRESS NOTES
0730- attempted to check vitals, patient is confused and combative, refused vitals. Daughter is present, wouldn't allow daughter to assist with vitals.

## 2024-02-16 NOTE — THERAPY
Missed Therapy    Patient Name: Lorraine Leung  Age:  78 y.o., Sex:  female  Medical Record #: [id number]  Today's Date: 2/16/2024    Discussed missed therapy with Dr. Powers, PT LAYO Copeland, and        02/16/24 1231   Therapy Missed   Missed Therapy (Minutes) 30   Reason For Missed Therapy Medical - Patient on Hold from Therapy;Medical - Patient Agitated  (Pt continued with hold due to agitation from AM session.)

## 2024-02-16 NOTE — PROGRESS NOTES
NURSING DAILY NOTE    Name: Lorraine Leung   Date of Admission: 2/9/2024   Admitting Diagnosis: Acute ischemic left MCA stroke (HCC)  Attending Physician: Carlin Powers M.d.  Allergies: Patient has no known allergies.    Safety  Patient Assist  Min A  Patient Precautions  Fall Risk  Precaution Comments  [place], pt wears headphones amplifier, O2=3.5L no wean  Bed Transfer Status  Minimal Assist  Toilet Transfer Status   Contact Guard Assist  Assistive Devices  Rails, Wheelchair  Oxygen  Silicone Nasal Cannula  Diet/Therapeutic Dining  Current Diet Order   Procedures    Diet Order Diet: Regular     Pill Administration  whole  Agitated Behavioral Scale  27  ABS Level of Severity  Mild Agitation    Fall Risk  Has the patient had a fall this admission?   No  Tiffani Hinton Fall Risk Scoring  18, HIGH RISK  Fall Risk Safety Measures  bed alarm, chair alarm, seatbelt alarm, posey bed , poor balance, and low vision/ hearing    Vitals  Temperature: 36.3 °C (97.4 °F)  Temp src: Oral  Pulse: 97  Respiration: 18  Blood Pressure : 125/84  Blood Pressure MAP (Calculated): 98 MM HG  BP Location: Right, Upper Arm  Patient BP Position: Sitting     Oxygen  Pulse Oximetry: 91 %  O2 (LPM): 2  O2 Delivery Device: Silicone Nasal Cannula    Bowel and Bladder  Last Bowel Movement  02/13/24  Stool Type  Type 4: Like a sausage or snake, smooth and soft  Bowel Device  Diaper, Bathroom  Continent  Bladder: Did not void   Bowel: No movement  Bladder Function  Urine Void (mL):  (after taking vitals, offered to take pt to bathroom, pt. replied with a sarcastic lara NO!, asked if there was anything i could get for her...another lara NO!)  Number of Times Voided: 1  Urine Color: Unable To Evaluate  Urine Clarity: Cloudy (Unable to see Through), Sediment  Urine Odor: Malodorous  Number of Times Incontinent of Urine: 0  Straight Catheter: 425 ml  Wet Diaper Count: 1  Genitourinary Assessment    Bladder Assessment (WDL):  WDL Except  Villela Catheter: Not Applicable  Urinary Elimination: Incontinence  Urine Color: Unable To Evaluate  Urine Clarity: Cloudy (Unable to see Through), Sediment  Urine Odor: Malodorous  Number of Bladder Accidents: 1  Total Number of Bladder of Accidents in Last 7 Days: 1  Number of Times Incontinent of Urine: 0  Bladder Device: Bathroom, Diaper  Time Void: Yes  Bladder Scan: Post Void  $ Bladder Scan Results (mL): 70    Skin  Selwyn Score   17  Sensory Interventions   Bed Types: Other (Comments) ([facility])  Skin Preventative Measures: Pillows in Use for Support / Positioning  Moisture Interventions  Moisturizers/Barriers: Barrier Wipes      Pain  Pain Rating Scale  0 - No Pain  Pain Location  Head, Neck  Pain Location Orientation  Posterior  Pain Interventions   Declines    ADLs    Bathing      Linen Change      Personal Hygiene  Change Octavia Pads, Moist Octavia Wipes, Perineal Care  Chlorhexidine Bath      Oral Care     Teeth/Dentures     Shave     Nutrition Percentage Eaten  Lunch, Between 25-50% Consumed  Environmental Precautions  Treaded Slipper Socks on Patient, Personal Belongings, Wastebasket, [place] etc. in Easy Reach, Transferred to Stronger Side, Report Given to Other Health Care Providers Regarding Fall Risk, Bed in Low Position  Patient Turns/Positioning  Patient Turns Self from Side to Side  Patient Turns Assistance/Tolerance     Bed Positions  Bed Controls On  Head of Bed Elevated  Self regulated      Psychosocial/Neurologic Assessment  Psychosocial Assessment  Psychosocial (WDL):  WDL Except  Patient Behaviors: Agitated, Irritable, Fatigue  Family Behaviors: Family Present  Neurologic Assessment  Neuro (WDL): Exceptions to WDL  Level of Consciousness: Alert  Orientation Level: Oriented X4  Cognition: Appropriate judgement, Follows commands  Speech: Clear  Pupil Assesment: No  R Pupil Size (mm): 3  R Pupil Shape / Description: Round  R Pupil Reaction: Brisk  L Pupil  Size (mm): 3  L Pupil Shape / Description: Round  L Pupil Reaction: Brisk  RUE Motor Response: Responds to commands  RUE Sensation: Numbness, Tingling  Muscle Strength Right Arm: Good Strength Against Gravity and Moderate Resistance  RLE Motor Response: Responds to commands  RLE Sensation: Numbness, Tingling  Muscle Strength Right Leg: Good Strength Against Gravity and Moderate Resistance  EENT (WDL):  WDL Except    Cardio/Pulmonary Assessment  Edema      Respiratory Breath Sounds  RUL Breath Sounds: Clear  RML Breath Sounds: Diminished  RLL Breath Sounds: Diminished  KASEY Breath Sounds: Clear  LLL Breath Sounds: Diminished  Cardiac Assessment   Cardiac (WDL):  WDL Except (hx cad)

## 2024-02-16 NOTE — THERAPY
Missed Therapy    Patient Name: Lorraine Leung  Age:  78 y.o., Sex:  female  Medical Record #: [id number]  Today's Date: 2/16/2024    Discussed missed therapy with MD, RN, and charge RN  Attempted to see pt and offer to assist her out of bed as she expressed frustration regarding posey bed. Pt became verbally abusive toward this therapist and deemed unsafe for transfer OOB. This is far from baseline after completing initial evaluation with pt who was very pleasant and has supportive family last week on 2/10. PT to continue to treat as appropriate       02/16/24 1500   Therapy Missed   Missed Therapy (Minutes) 60   Reason For Missed Therapy Medical - Patient on Hold from Therapy;Medical - Patient Agitated;Medical - Patient not Able To Participate

## 2024-02-17 PROBLEM — I10 HYPERTENSION: Status: ACTIVE | Noted: 2024-02-17

## 2024-02-17 PROBLEM — E87.6 HYPOKALEMIA: Status: ACTIVE | Noted: 2024-02-17

## 2024-02-17 PROBLEM — I25.10 CAD (CORONARY ARTERY DISEASE): Status: ACTIVE | Noted: 2024-02-17

## 2024-02-17 PROBLEM — J44.9 COPD (CHRONIC OBSTRUCTIVE PULMONARY DISEASE) (HCC): Status: ACTIVE | Noted: 2024-02-17

## 2024-02-17 PROBLEM — R41.82 AMS (ALTERED MENTAL STATUS): Status: ACTIVE | Noted: 2024-02-17

## 2024-02-17 PROBLEM — D68.51 FACTOR V LEIDEN (HCC): Status: ACTIVE | Noted: 2024-02-17

## 2024-02-17 PROBLEM — F41.9 ANXIETY: Status: ACTIVE | Noted: 2024-02-17

## 2024-02-17 LAB
ALBUMIN SERPL BCP-MCNC: 3.9 G/DL (ref 3.2–4.9)
ALBUMIN/GLOB SERPL: 1.1 G/DL
ALP SERPL-CCNC: 136 U/L (ref 30–99)
ALT SERPL-CCNC: 13 U/L (ref 2–50)
AMMONIA PLAS-SCNC: 16 UMOL/L (ref 11–45)
ANION GAP SERPL CALC-SCNC: 18 MMOL/L (ref 7–16)
APPEARANCE UR: ABNORMAL
AST SERPL-CCNC: 19 U/L (ref 12–45)
BACTERIA #/AREA URNS HPF: ABNORMAL /HPF
BASOPHILS # BLD AUTO: 1 % (ref 0–1.8)
BASOPHILS # BLD: 0.08 K/UL (ref 0–0.12)
BILIRUB SERPL-MCNC: 0.3 MG/DL (ref 0.1–1.5)
BILIRUB UR QL STRIP.AUTO: ABNORMAL
BUN SERPL-MCNC: 17 MG/DL (ref 8–22)
CALCIUM ALBUM COR SERPL-MCNC: 9 MG/DL (ref 8.5–10.5)
CALCIUM SERPL-MCNC: 8.9 MG/DL (ref 8.5–10.5)
CHLORIDE SERPL-SCNC: 100 MMOL/L (ref 96–112)
CO2 SERPL-SCNC: 20 MMOL/L (ref 20–33)
COLOR UR: YELLOW
CREAT SERPL-MCNC: 0.81 MG/DL (ref 0.5–1.4)
EOSINOPHIL # BLD AUTO: 0.36 K/UL (ref 0–0.51)
EOSINOPHIL NFR BLD: 4.7 % (ref 0–6.9)
EPI CELLS #/AREA URNS HPF: ABNORMAL /HPF
ERYTHROCYTE [DISTWIDTH] IN BLOOD BY AUTOMATED COUNT: 40.4 FL (ref 35.9–50)
GFR SERPLBLD CREATININE-BSD FMLA CKD-EPI: 75 ML/MIN/1.73 M 2
GLOBULIN SER CALC-MCNC: 3.7 G/DL (ref 1.9–3.5)
GLUCOSE SERPL-MCNC: 157 MG/DL (ref 65–99)
GLUCOSE UR STRIP.AUTO-MCNC: NEGATIVE MG/DL
HCT VFR BLD AUTO: 37.1 % (ref 37–47)
HGB BLD-MCNC: 12.1 G/DL (ref 12–16)
HYALINE CASTS #/AREA URNS LPF: ABNORMAL /LPF
IMM GRANULOCYTES # BLD AUTO: 0.01 K/UL (ref 0–0.11)
IMM GRANULOCYTES NFR BLD AUTO: 0.1 % (ref 0–0.9)
KETONES UR STRIP.AUTO-MCNC: ABNORMAL MG/DL
LACTATE SERPL-SCNC: 2.8 MMOL/L (ref 0.5–2)
LEUKOCYTE ESTERASE UR QL STRIP.AUTO: ABNORMAL
LYMPHOCYTES # BLD AUTO: 1.89 K/UL (ref 1–4.8)
LYMPHOCYTES NFR BLD: 24.7 % (ref 22–41)
MCH RBC QN AUTO: 30 PG (ref 27–33)
MCHC RBC AUTO-ENTMCNC: 32.6 G/DL (ref 32.2–35.5)
MCV RBC AUTO: 91.8 FL (ref 81.4–97.8)
MICRO URNS: ABNORMAL
MONOCYTES # BLD AUTO: 0.5 K/UL (ref 0–0.85)
MONOCYTES NFR BLD AUTO: 6.5 % (ref 0–13.4)
NEUTROPHILS # BLD AUTO: 4.8 K/UL (ref 1.82–7.42)
NEUTROPHILS NFR BLD: 63 % (ref 44–72)
NITRITE UR QL STRIP.AUTO: NEGATIVE
NRBC # BLD AUTO: 0 K/UL
NRBC BLD-RTO: 0 /100 WBC (ref 0–0.2)
PH UR STRIP.AUTO: 6 [PH] (ref 5–8)
PLATELET # BLD AUTO: 321 K/UL (ref 164–446)
PMV BLD AUTO: 9.5 FL (ref 9–12.9)
POTASSIUM SERPL-SCNC: 3.1 MMOL/L (ref 3.6–5.5)
PROCALCITONIN SERPL-MCNC: <0.05 NG/ML
PROT SERPL-MCNC: 7.6 G/DL (ref 6–8.2)
PROT UR QL STRIP: 30 MG/DL
RBC # BLD AUTO: 4.04 M/UL (ref 4.2–5.4)
RBC # URNS HPF: ABNORMAL /HPF
RBC UR QL AUTO: ABNORMAL
SODIUM SERPL-SCNC: 138 MMOL/L (ref 135–145)
SP GR UR STRIP.AUTO: 1.03
UROBILINOGEN UR STRIP.AUTO-MCNC: 0.2 MG/DL
WBC # BLD AUTO: 7.6 K/UL (ref 4.8–10.8)
WBC #/AREA URNS HPF: ABNORMAL /HPF

## 2024-02-17 PROCEDURE — 700102 HCHG RX REV CODE 250 W/ 637 OVERRIDE(OP): Performed by: HOSPITALIST

## 2024-02-17 PROCEDURE — 84145 PROCALCITONIN (PCT): CPT

## 2024-02-17 PROCEDURE — 82140 ASSAY OF AMMONIA: CPT

## 2024-02-17 PROCEDURE — 36415 COLL VENOUS BLD VENIPUNCTURE: CPT

## 2024-02-17 PROCEDURE — 80053 COMPREHEN METABOLIC PANEL: CPT

## 2024-02-17 PROCEDURE — 94640 AIRWAY INHALATION TREATMENT: CPT

## 2024-02-17 PROCEDURE — 770010 HCHG ROOM/CARE - REHAB SEMI PRIVAT*

## 2024-02-17 PROCEDURE — A9270 NON-COVERED ITEM OR SERVICE: HCPCS | Performed by: HOSPITALIST

## 2024-02-17 PROCEDURE — A9270 NON-COVERED ITEM OR SERVICE: HCPCS | Performed by: PHYSICAL MEDICINE & REHABILITATION

## 2024-02-17 PROCEDURE — A9270 NON-COVERED ITEM OR SERVICE: HCPCS | Mod: JZ | Performed by: PHYSICAL MEDICINE & REHABILITATION

## 2024-02-17 PROCEDURE — 99233 SBSQ HOSP IP/OBS HIGH 50: CPT | Performed by: PHYSICAL MEDICINE & REHABILITATION

## 2024-02-17 PROCEDURE — 99222 1ST HOSP IP/OBS MODERATE 55: CPT | Performed by: HOSPITALIST

## 2024-02-17 PROCEDURE — 97110 THERAPEUTIC EXERCISES: CPT

## 2024-02-17 PROCEDURE — 700111 HCHG RX REV CODE 636 W/ 250 OVERRIDE (IP): Mod: JZ | Performed by: PHYSICAL MEDICINE & REHABILITATION

## 2024-02-17 PROCEDURE — 87040 BLOOD CULTURE FOR BACTERIA: CPT | Mod: 91

## 2024-02-17 PROCEDURE — 94760 N-INVAS EAR/PLS OXIMETRY 1: CPT

## 2024-02-17 PROCEDURE — 85025 COMPLETE CBC W/AUTO DIFF WBC: CPT

## 2024-02-17 PROCEDURE — 700102 HCHG RX REV CODE 250 W/ 637 OVERRIDE(OP): Performed by: PHYSICAL MEDICINE & REHABILITATION

## 2024-02-17 PROCEDURE — 83605 ASSAY OF LACTIC ACID: CPT

## 2024-02-17 PROCEDURE — 700102 HCHG RX REV CODE 250 W/ 637 OVERRIDE(OP): Mod: JZ | Performed by: PHYSICAL MEDICINE & REHABILITATION

## 2024-02-17 PROCEDURE — 97116 GAIT TRAINING THERAPY: CPT

## 2024-02-17 RX ORDER — POTASSIUM CHLORIDE 20 MEQ/1
40 TABLET, EXTENDED RELEASE ORAL 2 TIMES DAILY
Status: COMPLETED | OUTPATIENT
Start: 2024-02-17 | End: 2024-02-17

## 2024-02-17 RX ADMIN — CLOPIDOGREL BISULFATE 75 MG: 75 TABLET ORAL at 10:57

## 2024-02-17 RX ADMIN — OMEPRAZOLE 20 MG: 20 CAPSULE, DELAYED RELEASE ORAL at 10:59

## 2024-02-17 RX ADMIN — ZIPRASIDONE MESYLATE 5 MG: 20 INJECTION, POWDER, LYOPHILIZED, FOR SOLUTION INTRAMUSCULAR at 10:08

## 2024-02-17 RX ADMIN — TRAZODONE HYDROCHLORIDE 50 MG: 50 TABLET ORAL at 21:16

## 2024-02-17 RX ADMIN — LOSARTAN POTASSIUM 50 MG: 50 TABLET, FILM COATED ORAL at 05:50

## 2024-02-17 RX ADMIN — PAROXETINE HYDROCHLORIDE 20 MG: 20 TABLET, FILM COATED ORAL at 10:58

## 2024-02-17 RX ADMIN — AMLODIPINE BESYLATE 10 MG: 5 TABLET ORAL at 05:50

## 2024-02-17 RX ADMIN — METOPROLOL TARTRATE 100 MG: 25 TABLET, FILM COATED ORAL at 18:14

## 2024-02-17 RX ADMIN — SULFAMETHOXAZOLE AND TRIMETHOPRIM 1 TABLET: 800; 160 TABLET ORAL at 10:58

## 2024-02-17 RX ADMIN — SULFAMETHOXAZOLE AND TRIMETHOPRIM 1 TABLET: 800; 160 TABLET ORAL at 21:16

## 2024-02-17 RX ADMIN — OLANZAPINE 5 MG: 2.5 TABLET, FILM COATED ORAL at 10:56

## 2024-02-17 RX ADMIN — SENNOSIDES AND DOCUSATE SODIUM 2 TABLET: 50; 8.6 TABLET ORAL at 21:16

## 2024-02-17 RX ADMIN — POTASSIUM CHLORIDE 40 MEQ: 1500 TABLET, EXTENDED RELEASE ORAL at 14:08

## 2024-02-17 RX ADMIN — ENOXAPARIN SODIUM 40 MG: 100 INJECTION SUBCUTANEOUS at 15:45

## 2024-02-17 RX ADMIN — POTASSIUM CHLORIDE 40 MEQ: 1500 TABLET, EXTENDED RELEASE ORAL at 21:16

## 2024-02-17 RX ADMIN — METOPROLOL TARTRATE 100 MG: 25 TABLET, FILM COATED ORAL at 05:49

## 2024-02-17 RX ADMIN — MOMETASONE FUROATE AND FORMOTEROL FUMARATE DIHYDRATE 2 PUFF: 100; 5 AEROSOL RESPIRATORY (INHALATION) at 16:20

## 2024-02-17 RX ADMIN — ACETAMINOPHEN 650 MG: 325 TABLET ORAL at 03:41

## 2024-02-17 RX ADMIN — TIOTROPIUM BROMIDE INHALATION SPRAY 5 MCG: 3.12 SPRAY, METERED RESPIRATORY (INHALATION) at 16:20

## 2024-02-17 RX ADMIN — ASPIRIN 81 MG 81 MG: 81 TABLET ORAL at 10:57

## 2024-02-17 RX ADMIN — ATORVASTATIN CALCIUM 40 MG: 40 TABLET, FILM COATED ORAL at 21:17

## 2024-02-17 RX ADMIN — ACETAMINOPHEN 650 MG: 325 TABLET ORAL at 15:45

## 2024-02-17 RX ADMIN — OLANZAPINE 5 MG: 2.5 TABLET, FILM COATED ORAL at 21:16

## 2024-02-17 RX ADMIN — MONTELUKAST 10 MG: 10 TABLET, FILM COATED ORAL at 21:16

## 2024-02-17 RX ADMIN — Medication 3 MG: at 18:14

## 2024-02-17 ASSESSMENT — GAIT ASSESSMENTS
ASSISTIVE DEVICE: FRONT WHEEL WALKER
DISTANCE (FEET): 15
GAIT LEVEL OF ASSIST: MINIMAL ASSIST

## 2024-02-17 ASSESSMENT — ACTIVITIES OF DAILY LIVING (ADL)
BED_CHAIR_WHEELCHAIR_TRANSFER_DESCRIPTION: ADAPTIVE EQUIPMENT;SET-UP OF EQUIPMENT;SUPERVISION FOR SAFETY;VERBAL CUEING;INCREASED TIME;INITIAL PREPARATION FOR TASK
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
## 2024-02-17 NOTE — PROGRESS NOTES
1935-Unable to do vital signs and give scheduled night time meds due to pt's being confused and combative; she goes on cursing hell at the staff.Three persons held her hands and legs just to clean her up good due to bladder incontinence. Will continue to monitor.

## 2024-02-17 NOTE — ASSESSMENT & PLAN NOTE
S/P NSTEMI  Echo (1/7/24): EF 45%, grade III DD  Cont ASA, Plavix, Lipitor  Cont Cozaar and Lopressor

## 2024-02-17 NOTE — CARE PLAN
"The patient is Watcher - Medium risk of patient condition declining or worsening    Shift Goals  Clinical Goals: Safety  Patient Goals: Participate in therapy    Progress made toward(s) clinical / shift goals:      Patient is not progressing towards the following goals:      Problem: Bladder / Voiding  Goal: Patient will establish and maintain regular urinary output  Outcome: Not Progressing  Note: Incontinent of bladder this shift. UA collected via straight cath. Urine milky and malodorous. Offered water but pt refusing due to her confusion.     Problem: Fall Risk - Rehab  Goal: Patient will remain free from falls  Outcome: Not Progressing  Note: Tiffani Hinton Fall risk Assessment Score: 29    High fall risk Interventions   - Alarming seatbelt  - Bed and strip alarm   - Yellow sign by the door   - Yellow wrist band \"Fall risk\"  - Room near to the nurse station  - Do not leave patient unattended in the bathroom  - Fall risk education provided    She remains in soma bed for safety. IM geodon given @ 2100 due to pt's agitation and combativeness. Will continue to monitor.     "

## 2024-02-17 NOTE — ASSESSMENT & PLAN NOTE
Reportedly on chronic supplemental O2 at 3 lpm via NC  Currently on 2 liters O2  Cont Dulera, Spiriva, and Singulair

## 2024-02-17 NOTE — CONSULTS
HOSPITAL MEDICINE CONSULTATION    Requesting Physician:  Dr. Morton    Reason for Consult:  Altered Mental Status    History of Present Illness:  The patient is a 78-year-old female with past medical history significant for Factor V Leiden not on chronic anticoagulation, hypertension, chronic obstructive pulmonary disease on 3 liters per minute of supplemental oxygen, and anxiety.  Her present illness began on 1/6/24 when she was admitted to [facility] after being found down.  The patient was diagnosed with left middle cerebral artery ischemic stroke and non-ST elevation myocardial infarction, which were presumably medically managed.  She was then referred to a skilled nursing facility.  Due to her ongoing functional debility, the patient was transferred to [facility] on 2/9/24.  Hospital Medicine consultation is requested on 2/17/24 to assist in the management of this patient's altered mentation.  She is also noted to have hypokalemia.    Review of Systems:  Review of Systems   Unable to perform ROS: Mental status change       Allergies:  No Known Allergies    Medications:    Current Facility-Administered Medications:     potassium chloride SA (Kdur) tablet 40 mEq, 40 mEq, Oral, BID, Kiersten Morton D.O., 40 mEq at 02/17/24 1408    mometasone-formoterol (Dulera) 100-5 MCG/ACT inhaler 2 Puff, 2 Puff, Inhalation, BID (RT), Ирина Acevedo M.D.    tiotropium (Spiriva Respimat) 2.5 mcg/Act inhalation spray 5 mcg, 5 mcg, Inhalation, QDAILY (RT), Ирина Acevedo M.D.    ziprasidone (Geodon) injection 5 mg, 5 mg, Intramuscular, Q2HRS PRN, Carlin Powers M.D., 5 mg at 02/17/24 1008    OLANZapine (ZyPREXA) tablet 5 mg, 5 mg, Oral, BID, Carlin Powers M.D., 5 mg at 02/17/24 1056    traZODone (Desyrel) tablet 50 mg, 50 mg, Oral, QHS, Carlin Powers M.D., 50 mg at 02/15/24 2050    OLANZapine (ZyPREXA) tablet 2.5 mg, 2.5 mg, Oral, Q6HRS PRN, Carlin Powers M.D.    PARoxetine (Paxil) tablet  20 mg, 20 mg, Oral, DAILY, Carlin Powers M.D., 20 mg at 02/17/24 1058    losartan (Cozaar) tablet 50 mg, 50 mg, Oral, Q DAY, Carlin Powers M.D., 50 mg at 02/17/24 0550    melatonin tablet 3 mg, 3 mg, Oral, AFTER DINNER, Carlin Powers M.D., 3 mg at 02/15/24 1802    sulfamethoxazole-trimethoprim (Bactrim DS) 800-160 MG tablet 1 Tablet, 1 Tablet, Oral, Q12HRS, Carlin Powers M.D., 1 Tablet at 02/17/24 1058    acetaminophen (Tylenol) tablet 650 mg, 650 mg, Oral, Q6HRS PRN, Deandre Sahni D.OChio, 650 mg at 02/17/24 1545    amLODIPine (Norvasc) tablet 10 mg, 10 mg, Oral, Q DAY, Carlin Powers M.D., 10 mg at 02/17/24 0550    aspirin (Asa) chewable tab 81 mg, 81 mg, Oral, DAILY, Carlin Powers M.D., 81 mg at 02/17/24 1057    atorvastatin (Lipitor) tablet 40 mg, 40 mg, Oral, Q EVENING, Carlin Powers M.D., 40 mg at 02/15/24 2050    clopidogrel (Plavix) tablet 75 mg, 75 mg, Oral, DAILY, Carlin Powers M.D., 75 mg at 02/17/24 1057    metoprolol tartrate (Lopressor) tablet 100 mg, 100 mg, Oral, BID, Carlin Powers M.D., 100 mg at 02/17/24 0549    montelukast (Singulair) tablet 10 mg, 10 mg, Oral, Nightly, Carlin Powers M.D., 10 mg at 02/15/24 2050    Respiratory Therapy Consult, , Nebulization, Continuous RT, Carlin Powers M.D.    Pharmacy Consult Request ...Pain Management Review 1 Each, 1 Each, Other, PHARMACY TO DOSE, Carlin Powers M.D.    hydrALAZINE (Apresoline) tablet 25 mg, 25 mg, Oral, Q8HRS PRN, Carlin Powers M.D.    senna-docusate (Pericolace Or Senokot S) 8.6-50 MG per tablet 2 Tablet, 2 Tablet, Oral, BID, 2 Tablet at 02/15/24 2051 **AND** polyethylene glycol/lytes (Miralax) Packet 1 Packet, 1 Packet, Oral, QDAY PRN, Carlin Powers M.D.    omeprazole (PriLOSEC) capsule 20 mg, 20 mg, Oral, DAILY, Carlin Powers M.D., 20 mg at 02/17/24 1059    ondansetron (Zofran ODT) dispertab 4 mg, 4 mg, Oral, 4X/DAY PRN, 4 mg at 02/15/24 1111 **OR** ondansetron (Zofran) syringe/vial injection 4 mg, 4 mg,  Intramuscular, 4X/DAY PRN, Carlin Powers M.D.    sodium chloride (Ocean) 0.65 % nasal spray 2 Spray, 2 Spray, Nasal, PRN, Carlin Powers M.D.    enoxaparin (Lovenox) inj 40 mg, 40 mg, Subcutaneous, DAILY AT 1800, Carlin Powers M.D., 40 mg at 24 1545    Past Medical/Surgical History:  Past Medical History:   Diagnosis Date    Anxiety     CAD (coronary artery disease)     Factor V deficiency (HCC)     Hypoxia     3L/NC baseline    Stroke (HCC)     Left MCA     Vertigo      History reviewed. No pertinent surgical history.    Social History:  Social History     Socioeconomic History    Marital status:      Spouse name: Not on file    Number of children: Not on file    Years of education: Not on file    Highest education level: Not on file   Occupational History    Not on file   Tobacco Use    Smoking status: Former     Current packs/day: 0.00     Types: Cigarettes     Quit date: 1990     Years since quittin.1     Passive exposure: Never    Smokeless tobacco: Not on file   Vaping Use    Vaping Use: Not on file   Substance and Sexual Activity    Alcohol use: Never    Drug use: Never    Sexual activity: Not on file   Other Topics Concern    Not on file   Social History Narrative    Not on file     Social Determinants of Health     Financial Resource Strain: Not on file   Food Insecurity: Not on file   Transportation Needs: No Transportation Needs (2024)    PRAPARE - Transportation     Lack of Transportation (Medical): No     Lack of Transportation (Non-Medical): No   Physical Activity: Not on file   Stress: Not on file   Social Connections: Not on file   Intimate Partner Violence: Not on file   Housing Stability: Not on file       Family History:  History reviewed. No pertinent family history.    Physical Examination:   Vitals:    24 0118 24 0356 24 1102 24 1112   BP:  (!) 140/81 132/82    Pulse: 82 94 (!) 113    Resp:   18    Temp:  36.8 °C (98.2 °F)  37 °C (98.6  °F)   TempSrc:  Oral     SpO2: 90%      Weight:       Height:           Physical Exam  Vitals reviewed.   Constitutional:       General: She is not in acute distress.     Appearance: Normal appearance. She is not ill-appearing.   HENT:      Head: Normocephalic and atraumatic.      Right Ear: External ear normal.      Left Ear: External ear normal.      Nose: Nose normal.      Mouth/Throat:      Pharynx: Oropharynx is clear.   Eyes:      General:         Right eye: No discharge.         Left eye: No discharge.      Extraocular Movements: Extraocular movements intact.      Conjunctiva/sclera: Conjunctivae normal.   Cardiovascular:      Rate and Rhythm: Normal rate and regular rhythm.   Pulmonary:      Effort: No respiratory distress.      Breath sounds: No wheezing.      Comments: Decreased BS  Abdominal:      General: Bowel sounds are normal. There is no distension.      Palpations: Abdomen is soft.      Tenderness: There is no abdominal tenderness.   Musculoskeletal:      Cervical back: Normal range of motion and neck supple.      Right lower leg: No edema.      Left lower leg: No edema.   Skin:     General: Skin is warm and dry.   Neurological:      Mental Status: She is alert.      Comments: Awake         Laboratory Data:  Recent Labs     02/17/24  1205   WBC 7.6   RBC 4.04*   HEMOGLOBIN 12.1   HEMATOCRIT 37.1   MCV 91.8   MCH 30.0   MCHC 32.6   RDW 40.4   PLATELETCT 321   MPV 9.5     Recent Labs     02/17/24  1205   SODIUM 138   POTASSIUM 3.1*   CHLORIDE 100   CO2 20   GLUCOSE 157*   BUN 17   CREATININE 0.81   CALCIUM 8.9       Imaging:      Impressions/Recommendations:  Acute ischemic left MCA stroke (HCC)  On ASA, Plavix, and Lipitor  Ongoing management per Physiatry    Anxiety  Chronically on Paxil    Factor V Leiden (HCC)  Not on chronic anticoagulation  U/S BLE 2/11/24 negative for DVT  Recommend Hematology eval    COPD (chronic obstructive pulmonary disease) (HCC)  Reportedly on chronic supplemental O2 at 3  lpm via NC  On Singulair  Add Dulera and Spiriva to optimize management  RT protocol    CAD (coronary artery disease)  S/P NSTEMI  Echo 1/7/24 EF 45%, grade III DD  On ASA, Plavix, Lipitor, Losartan, and Metoprolol  Check BNP and CXR    AMS (altered mental status)  PCT <0.05  NH3 16  UA packed WBC w/ mod bacteria, UCx pending  BCx x 2 pending  Request CXR  CT Head negative acute  On Bactrim, Zyprexa, Trazodone, and prn Geodon per Physiatry    Hypokalemia  Replete K+  Check Mg++    Hypertension  Observe blood pressure trends on Norvasc, Losartan, and Metoprolol    Full Code    Discussed with patient's family and Dr. Morton.  Thank you for the opportunity to assist in this patient's care.  We will continue to follow along with you.

## 2024-02-17 NOTE — CARE PLAN
The patient is Watcher - Medium risk of patient condition declining or worsening    Shift Goals  Clinical Goals: Safety  Patient Goals: Rest    Progress made toward(s) clinical / shift goals:  STAT CBC, CMP, Lactic Acid, Procalcitonin drawn, EKG completed.      Problem: Hemodynamics  Goal: Patient's hemodynamics, fluid balance and neurologic status will be stable or improve  Outcome: Progressing  Note:    Latest Reference Range & Units 02/17/24 12:05   WBC 4.8 - 10.8 K/uL 7.6   RBC 4.20 - 5.40 M/uL 4.04 (L)   Hemoglobin 12.0 - 16.0 g/dL 12.1   Hematocrit 37.0 - 47.0 % 37.1   MCV 81.4 - 97.8 fL 91.8   MCH 27.0 - 33.0 pg 30.0   MCHC 32.2 - 35.5 g/dL 32.6   RDW 35.9 - 50.0 fL 40.4   Platelet Count 164 - 446 K/uL 321   MPV 9.0 - 12.9 fL 9.5   Neutrophils-Polys 44.00 - 72.00 % 63.00   Neutrophils (Absolute) 1.82 - 7.42 K/uL 4.80   Lymphocytes 22.00 - 41.00 % 24.70   Lymphs (Absolute) 1.00 - 4.80 K/uL 1.89   Monocytes 0.00 - 13.40 % 6.50   Monos (Absolute) 0.00 - 0.85 K/uL 0.50   Eosinophils 0.00 - 6.90 % 4.70   Eos (Absolute) 0.00 - 0.51 K/uL 0.36   Basophils 0.00 - 1.80 % 1.00   Baso (Absolute) 0.00 - 0.12 K/uL 0.08   Immature Granulocytes 0.00 - 0.90 % 0.10   Immature Granulocytes (abs) 0.00 - 0.11 K/uL 0.01   Nucleated RBC 0.00 - 0.20 /100 WBC 0.00   NRBC (Absolute) K/uL 0.00   Sodium 135 - 145 mmol/L 138   Potassium 3.6 - 5.5 mmol/L 3.1 (L)   Chloride 96 - 112 mmol/L 100   Co2 20 - 33 mmol/L 20   Anion Gap 7.0 - 16.0  18.0 (H)   Glucose 65 - 99 mg/dL 157 (H)   Bun 8 - 22 mg/dL 17   Creatinine 0.50 - 1.40 mg/dL 0.81   GFR (CKD-EPI) >60 mL/min/1.73 m 2 75   Calcium 8.5 - 10.5 mg/dL 8.9   Correct Calcium 8.5 - 10.5 mg/dL 9.0   AST(SGOT) 12 - 45 U/L 19   ALT(SGPT) 2 - 50 U/L 13   Alkaline Phosphatase 30 - 99 U/L 136 (H)   Total Bilirubin 0.1 - 1.5 mg/dL 0.3   Albumin 3.2 - 4.9 g/dL 3.9   Total Protein 6.0 - 8.2 g/dL 7.6   Globulin 1.9 - 3.5 g/dL 3.7 (H)   A-G Ratio g/dL 1.1   Ammonia 11 - 45 umol/L 16   Lactic Acid 0.5 -  2.0 mmol/L 2.8 (H)   Procalcitonin <0.25 ng/mL <0.05

## 2024-02-17 NOTE — PROGRESS NOTES
2100-Geodon injection given due to pt too agitated and kept removing her nasal cannula. Attempted to take VS but pt is too combative. Able to collect UA via straight cath with the whole night staff assistance. Will continue to monitor.

## 2024-02-17 NOTE — PROGRESS NOTES
Medications crushed and put into unopened water bottle per patient's request. Daughter and sister present and assisting in care and medication administration. Patient refused to drink from the bottle unless her daughter drank from it first. We explained no one could drink from it due to the medication in it. Momentarily it appeared patient would ingest the medications from the bottle but after the family stepped out of the room to discuss plan of care with Dr. Morton the patient through the water bottle at the nurse. Documentation updated in the MAR as refused.

## 2024-02-17 NOTE — CARE PLAN
The patient is Unstable - High likelihood or risk of patient condition declining or worsening    Shift Goals  Clinical Goals: Safety    Problem: Psychosocial  Goal: Patient's level of anxiety will decrease  Note: Patient is combative and confused, PRN geodon given     Problem: Skin Integrity  Goal: Patient's skin integrity will be maintained or improve  Note: Patient has redness to RLE heel, unable to assess sacrum redness

## 2024-02-17 NOTE — PROGRESS NOTES
NURSING DAILY NOTE    Name: Lorraine Leung   Date of Admission: 2/9/2024   Admitting Diagnosis: Acute ischemic left MCA stroke (HCC)  Attending Physician: Carlin Powers M.d.  Allergies: Patient has no known allergies.    Safety  Patient Assist  max assistance  Patient Precautions  Fall Risk  Precaution Comments  [place], pt wears headphones amplifier, O2=3.5L no wean  Bed Transfer Status  Minimal Assist  Toilet Transfer Status   Contact Guard Assist  Assistive Devices  Rails, Wheelchair  Oxygen  None - Room Air  Diet/Therapeutic Dining  Current Diet Order   Procedures    Diet Order Diet: Regular     Pill Administration  crushed  Agitated Behavioral Scale  35  ABS Level of Severity  Moderate Agitation    Fall Risk  Has the patient had a fall this admission?   No  Tiffani Hinton Fall Risk Scoring  29, HIGH RISK  Fall Risk Safety Measures  bed alarm, chair alarm, posey bed , poor balance, and low vision/ hearing    Vitals  Temperature: 36.8 °C (98.2 °F)  Temp src: Oral  Pulse: 94  Respiration: 18  Blood Pressure : (Abnormal) 140/81  Blood Pressure MAP (Calculated): 101 MM HG  BP Location: Right, Upper Arm  Patient BP Position: Supine     Oxygen  Pulse Oximetry: 90 %  O2 (LPM): 2  O2 Delivery Device: None - Room Air    Bowel and Bladder  Last Bowel Movement  02/13/24  Stool Type  Type 4: Like a sausage or snake, smooth and soft  Bowel Device  Diaper, Bathroom  Continent  Bladder: Did not void   Bowel: No movement  Bladder Function  Urine Void (mL):  (after taking vitals, offered to take pt to bathroom, pt. replied with a sarcastic lara NO!, asked if there was anything i could get for her...another lara NO!)  Number of Times Voided: 1  Urine Color: Other (Comments) (milky)  Urine Clarity: Cloudy (Unable to see Through)  Urine Odor: Malodorous  Number of Times Incontinent of Urine: 1  Straight Catheter: 425 ml  Wet Diaper Count: 1  Genitourinary Assessment   Bladder  Assessment (WDL):  WDL Except  [place] Catheter: Not Applicable  Urinary Elimination: Incontinence  Urine Color: Other (Comments) (milky)  Urine Clarity: Cloudy (Unable to see Through)  Urine Odor: Malodorous  Number of Bladder Accidents: 1  Total Number of Bladder of Accidents in Last 7 Days: 1  Number of Times Incontinent of Urine: 1  Bladder Device: Diaper  Time Void: Yes  Bladder Scan: Post Void  $ Bladder Scan Results (mL): 70    Skin  [place] Score   13  Sensory Interventions   Bed Types:  ([facility] bed)  Skin Preventative Measures: Pillows in Use for Support / Positioning  Moisture Interventions  Moisturizers/Barriers: Barrier Wipes      Pain  Pain Rating Scale  0 - No Pain  Pain Location  Head, Neck  Pain Location Orientation  Posterior  Pain Interventions   Declines    ADLs    Bathing      Linen Change   Partial  Personal Hygiene  Moist Octavia Wipes, Perineal Care  Chlorhexidine Bath      Oral Care     Teeth/Dentures     Shave     Nutrition Percentage Eaten  *  * Meal *  *, Dinner, 0% Consumed, Refused  Environmental Precautions  Treaded Slipper Socks on Patient, Personal Belongings, Wastebasket, [place] etc. in Easy Reach, Transferred to Stronger Side, Report Given to Other Health Care Providers Regarding Fall Risk, Bed in Low Position  Patient Turns/Positioning  Patient Turns Self from Side to Side  Patient Turns Assistance/Tolerance     Bed Positions  Bed Controls On  Head of Bed Elevated  Self regulated      Psychosocial/Neurologic Assessment  Psychosocial Assessment  Psychosocial (WDL):  WDL Except  Patient Behaviors: Agitated, Aggressive Physically, Aggressive Verbally, Combative, Confused, Delusional, Non Compliant, Uncooperative  Family Behaviors: No Family Present  Neurologic Assessment  Neuro (WDL): Exceptions to WDL  Level of Consciousness: Alert  Orientation Level: Disoriented to place, Disoriented to time, Disoriented to situation  Cognition: Confused in conversation  Speech: Clear  Pupil Assesment:  No  R Pupil Size (mm): 3  R Pupil Shape / Description: Round  R Pupil Reaction: Brisk  L Pupil Size (mm): 3  L Pupil Shape / Description: Round  L Pupil Reaction: Brisk  RUE Motor Response: Responds to commands  RUE Sensation: Numbness, Tingling  Muscle Strength Right Arm: Good Strength Against Gravity and Moderate Resistance  RLE Motor Response: Responds to commands  RLE Sensation: Numbness, Tingling  Muscle Strength Right Leg: Good Strength Against Gravity and Moderate Resistance  EENT (WDL):  WDL Except    Cardio/Pulmonary Assessment  Edema      Respiratory Breath Sounds  RUL Breath Sounds: Clear  RML Breath Sounds: Diminished  RLL Breath Sounds: Diminished  KASEY Breath Sounds: Clear  LLL Breath Sounds: Diminished  Cardiac Assessment   Cardiac (WDL):  WDL Except

## 2024-02-17 NOTE — PROGRESS NOTES
2330- Attempted to give her bedtime meds due to pt with c/o back pain. Pulled out tylenol PRN but pt kept refusing it and the other meds and just wanted them dissolved in bottled water. Offered to take it with apple sauce but  pt is still very confused and just fooling the staff that she will be cooperative but she wasn't really following. Will continue to monitor.

## 2024-02-18 ENCOUNTER — APPOINTMENT (OUTPATIENT)
Dept: RADIOLOGY | Facility: REHABILITATION | Age: 79
DRG: 056 | End: 2024-02-18
Attending: HOSPITALIST
Payer: MEDICARE

## 2024-02-18 PROCEDURE — 99232 SBSQ HOSP IP/OBS MODERATE 35: CPT | Performed by: PHYSICAL MEDICINE & REHABILITATION

## 2024-02-18 PROCEDURE — A9270 NON-COVERED ITEM OR SERVICE: HCPCS | Performed by: PHYSICAL MEDICINE & REHABILITATION

## 2024-02-18 PROCEDURE — 71045 X-RAY EXAM CHEST 1 VIEW: CPT

## 2024-02-18 PROCEDURE — 94760 N-INVAS EAR/PLS OXIMETRY 1: CPT

## 2024-02-18 PROCEDURE — 700102 HCHG RX REV CODE 250 W/ 637 OVERRIDE(OP): Performed by: PHYSICAL MEDICINE & REHABILITATION

## 2024-02-18 PROCEDURE — 94640 AIRWAY INHALATION TREATMENT: CPT

## 2024-02-18 PROCEDURE — 700111 HCHG RX REV CODE 636 W/ 250 OVERRIDE (IP): Mod: JZ | Performed by: PHYSICAL MEDICINE & REHABILITATION

## 2024-02-18 PROCEDURE — 99232 SBSQ HOSP IP/OBS MODERATE 35: CPT | Performed by: HOSPITALIST

## 2024-02-18 PROCEDURE — 770010 HCHG ROOM/CARE - REHAB SEMI PRIVAT*

## 2024-02-18 RX ADMIN — MOMETASONE FUROATE AND FORMOTEROL FUMARATE DIHYDRATE 2 PUFF: 100; 5 AEROSOL RESPIRATORY (INHALATION) at 13:11

## 2024-02-18 RX ADMIN — ASPIRIN 81 MG 81 MG: 81 TABLET ORAL at 10:43

## 2024-02-18 RX ADMIN — AMLODIPINE BESYLATE 10 MG: 5 TABLET ORAL at 05:14

## 2024-02-18 RX ADMIN — OMEPRAZOLE 20 MG: 20 CAPSULE, DELAYED RELEASE ORAL at 10:43

## 2024-02-18 RX ADMIN — CLOPIDOGREL BISULFATE 75 MG: 75 TABLET ORAL at 10:43

## 2024-02-18 RX ADMIN — SULFAMETHOXAZOLE AND TRIMETHOPRIM 1 TABLET: 800; 160 TABLET ORAL at 10:43

## 2024-02-18 RX ADMIN — OLANZAPINE 5 MG: 2.5 TABLET, FILM COATED ORAL at 20:46

## 2024-02-18 RX ADMIN — Medication 3 MG: at 17:24

## 2024-02-18 RX ADMIN — METOPROLOL TARTRATE 100 MG: 25 TABLET, FILM COATED ORAL at 05:14

## 2024-02-18 RX ADMIN — ACETAMINOPHEN 650 MG: 325 TABLET ORAL at 20:46

## 2024-02-18 RX ADMIN — TIOTROPIUM BROMIDE INHALATION SPRAY 5 MCG: 3.12 SPRAY, METERED RESPIRATORY (INHALATION) at 13:11

## 2024-02-18 RX ADMIN — LOSARTAN POTASSIUM 50 MG: 50 TABLET, FILM COATED ORAL at 05:14

## 2024-02-18 RX ADMIN — OLANZAPINE 5 MG: 2.5 TABLET, FILM COATED ORAL at 10:43

## 2024-02-18 RX ADMIN — SENNOSIDES AND DOCUSATE SODIUM 2 TABLET: 50; 8.6 TABLET ORAL at 20:46

## 2024-02-18 RX ADMIN — TRAZODONE HYDROCHLORIDE 50 MG: 50 TABLET ORAL at 20:46

## 2024-02-18 RX ADMIN — SENNOSIDES AND DOCUSATE SODIUM 2 TABLET: 50; 8.6 TABLET ORAL at 10:43

## 2024-02-18 RX ADMIN — METOPROLOL TARTRATE 100 MG: 25 TABLET, FILM COATED ORAL at 17:24

## 2024-02-18 RX ADMIN — PAROXETINE HYDROCHLORIDE 20 MG: 20 TABLET, FILM COATED ORAL at 10:43

## 2024-02-18 RX ADMIN — ENOXAPARIN SODIUM 40 MG: 100 INJECTION SUBCUTANEOUS at 17:24

## 2024-02-18 RX ADMIN — ATORVASTATIN CALCIUM 40 MG: 40 TABLET, FILM COATED ORAL at 20:47

## 2024-02-18 RX ADMIN — MONTELUKAST 10 MG: 10 TABLET, FILM COATED ORAL at 20:47

## 2024-02-18 RX ADMIN — SULFAMETHOXAZOLE AND TRIMETHOPRIM 1 TABLET: 800; 160 TABLET ORAL at 20:46

## 2024-02-18 ASSESSMENT — PAIN DESCRIPTION - PAIN TYPE: TYPE: ACUTE PAIN

## 2024-02-18 NOTE — ASSESSMENT & PLAN NOTE
PCT <0.05  NH4: 16  Urine cultures show Citrobacter  BC x 2: neg  CXR: showed left atx or pneumonia  CT head: negative for acute  Cont Rocephin (thru 2/23)  Note: Zyprexa, Trazodone, and prn Geodon (per Physiatry)

## 2024-02-18 NOTE — PROGRESS NOTES
"  Physical Medicine & Rehabilitation Progress Note    Encounter Date: 2/18/2024    Chief Complaint:  confusion     Interval Events (Subjective):  Vitals Reviewed : BP elevated to 146/96, otherwise WNL   Labs reviewed: 2/17 elevated lactic acid     Patient seen in room, patient sleeping comfortably in enclosure bed. Patient refused blood draws, but has not been agitated or combative today. Patient politely asking daughters to let her sleep, stating \"the sleep is good for me.\" Daughter reviewed with patient that she can sleep, when she wakes she'll need to have breakfast and meds, patient agreeable.     Objective:  Physical Exam:  Vitals: BP (!) 146/96   Pulse 88   Temp 36.8 °C (98.3 °F) (Oral)   Resp 18   Ht 1.676 m (5' 6\")   Wt 63 kg (138 lb 14.2 oz)   SpO2 93%   Gen: NAD, sleeping comfortably in enclosure bed   Head:  NC/AT, has amplifier headphones in place   Eyes/ Nose/ Mouth: PERRLA, moist mucous membranes  Pulm: normal respiratory effort, no cyanosis, on RA   Mental status: sleeping,   Speech: fluent, no aphasia or dysarthria      Laboratory Values:  Recent Results (from the past 72 hour(s))   URINALYSIS    Collection Time: 02/16/24  9:00 PM    Specimen: Urine, Cath   Result Value Ref Range    Color Yellow     Character Turbid (A)     Specific Gravity 1.030 <1.035    Ph 6.0 5.0 - 8.0    Glucose Negative Negative mg/dL    Ketones Trace (A) Negative mg/dL    Protein 30 (A) Negative mg/dL    Bilirubin Small (A) Negative    Urobilinogen, Urine 0.2 Negative    Nitrite Negative Negative    Leukocyte Esterase Large (A) Negative    Occult Blood Moderate (A) Negative    Micro Urine Req Microscopic    URINE MICROSCOPIC (W/UA)    Collection Time: 02/16/24  9:00 PM   Result Value Ref Range    WBC Packed (A) /hpf    RBC 10-20 (A) /hpf    Bacteria Moderate (A) None /hpf    Epithelial Cells Rare /hpf    Hyaline Cast 0-2 /lpf   URINE CULTURE-EXISTING-LESS THAN 48 HOURS    Collection Time: 02/16/24  9:00 PM    Specimen: " Urine, Straight Cath   Result Value Ref Range    Significant Indicator NEG     Source UR     Site URINE, STRAIGHT CATH     Culture Result -    CBC WITH DIFFERENTIAL    Collection Time: 02/17/24 12:05 PM   Result Value Ref Range    WBC 7.6 4.8 - 10.8 K/uL    RBC 4.04 (L) 4.20 - 5.40 M/uL    Hemoglobin 12.1 12.0 - 16.0 g/dL    Hematocrit 37.1 37.0 - 47.0 %    MCV 91.8 81.4 - 97.8 fL    MCH 30.0 27.0 - 33.0 pg    MCHC 32.6 32.2 - 35.5 g/dL    RDW 40.4 35.9 - 50.0 fL    Platelet Count 321 164 - 446 K/uL    MPV 9.5 9.0 - 12.9 fL    Neutrophils-Polys 63.00 44.00 - 72.00 %    Lymphocytes 24.70 22.00 - 41.00 %    Monocytes 6.50 0.00 - 13.40 %    Eosinophils 4.70 0.00 - 6.90 %    Basophils 1.00 0.00 - 1.80 %    Immature Granulocytes 0.10 0.00 - 0.90 %    Nucleated RBC 0.00 0.00 - 0.20 /100 WBC    Neutrophils (Absolute) 4.80 1.82 - 7.42 K/uL    Lymphs (Absolute) 1.89 1.00 - 4.80 K/uL    Monos (Absolute) 0.50 0.00 - 0.85 K/uL    Eos (Absolute) 0.36 0.00 - 0.51 K/uL    Baso (Absolute) 0.08 0.00 - 0.12 K/uL    Immature Granulocytes (abs) 0.01 0.00 - 0.11 K/uL    NRBC (Absolute) 0.00 K/uL   Comp Metabolic Panel    Collection Time: 02/17/24 12:05 PM   Result Value Ref Range    Sodium 138 135 - 145 mmol/L    Potassium 3.1 (L) 3.6 - 5.5 mmol/L    Chloride 100 96 - 112 mmol/L    Co2 20 20 - 33 mmol/L    Anion Gap 18.0 (H) 7.0 - 16.0    Glucose 157 (H) 65 - 99 mg/dL    Bun 17 8 - 22 mg/dL    Creatinine 0.81 0.50 - 1.40 mg/dL    Calcium 8.9 8.5 - 10.5 mg/dL    Correct Calcium 9.0 8.5 - 10.5 mg/dL    AST(SGOT) 19 12 - 45 U/L    ALT(SGPT) 13 2 - 50 U/L    Alkaline Phosphatase 136 (H) 30 - 99 U/L    Total Bilirubin 0.3 0.1 - 1.5 mg/dL    Albumin 3.9 3.2 - 4.9 g/dL    Total Protein 7.6 6.0 - 8.2 g/dL    Globulin 3.7 (H) 1.9 - 3.5 g/dL    A-G Ratio 1.1 g/dL   LACTIC ACID    Collection Time: 02/17/24 12:05 PM   Result Value Ref Range    Lactic Acid 2.8 (H) 0.5 - 2.0 mmol/L   PROCALCITONIN    Collection Time: 02/17/24 12:05 PM   Result Value  Ref Range    Procalcitonin <0.05 <0.25 ng/mL   AMMONIA    Collection Time: 02/17/24 12:05 PM   Result Value Ref Range    Ammonia 16 11 - 45 umol/L   ESTIMATED GFR    Collection Time: 02/17/24 12:05 PM   Result Value Ref Range    GFR (CKD-EPI) 75 >60 mL/min/1.73 m 2       Medications:  Scheduled Medications   Medication Dose Frequency    mometasone-formoterol  2 Puff BID (RT)    tiotropium  5 mcg QDAILY (RT)    OLANZapine  5 mg BID    traZODone  50 mg QHS    PARoxetine  20 mg DAILY    losartan  50 mg Q DAY    melatonin  3 mg AFTER DINNER    sulfamethoxazole-trimethoprim  1 Tablet Q12HRS    amLODIPine  10 mg Q DAY    aspirin  81 mg DAILY    atorvastatin  40 mg Q EVENING    clopidogrel  75 mg DAILY    metoprolol tartrate  100 mg BID    montelukast  10 mg Nightly    Pharmacy Consult Request  1 Each PHARMACY TO DOSE    senna-docusate  2 Tablet BID    omeprazole  20 mg DAILY    enoxaparin (LOVENOX) injection  40 mg DAILY AT 1800     PRN medications: ziprasidone, OLANZapine, acetaminophen, Respiratory Therapy Consult, hydrALAZINE, senna-docusate **AND** polyethylene glycol/lytes, ondansetron **OR** ondansetron, sodium chloride    Diet:  Current Diet Order   Procedures    Diet Order Diet: Regular       Medical Decision Making and Plan:  CVA:  - Patient with L MCA stroke on 1/6. Etiology likely Unclear . Received NO intervention  - Continue ASA and statin for secondary stroke prophylaxis.  -Zio patch     Severe Acute metabolic encephalopathy  Verbally and physically agitated towards staff without provokation  -2/16 -Increased Zyprexa to 5mg BID. Hoping to give a dose at night but may need to use GEODON  -Geodon 5mg PRN, 10mg Geodon caused patient to sleep for 3-4hrs. Decreased to 5mg.  -Continue to empirically treat UTI with bactrim. IM Rocephin given on 2/16 as she refused all medications  I certify that the patient currently requires non-pharmacologic restraints. Non-pharmacologic restraints are required to reduce the  potential for the patient to harm themselves or others, to limit violent behaviors, and to allow proper assessment and care. I have completed a face to face assessment of this patient on 2/18/2024. Alternative methods including but not limited to de-escalation techniques, redirection, and pharmacologic treatment have been attempted but patient currently remains at risk without restraints. Our staff has been trained on restraints and patient is currently placed in video monitored room and/or close to the nursing station.  The need for these restraints is assessed by a rehabilitation physician every 24 hours and use of restraints is minimized when appropriate.  Current diagnosis which requires restraints: TBI. Current restraints required: Posey.  -2/17 patient unwilling to take any PO meds, unwilling to let staff take vitals. Patient continues to be agitated, delirius; new paranoia with possible psychosis, stating she was scared about being raped and thinks she has things left in her vagina.   - will plan to give IM geodon and then attempt vitals and labs. If unsuccessful, will need transfer to ED   - update, patient willing to cooperative with lab draw after IM geodon, labs resulted, hospitalist consulted.    - 2/18 patient has not required IM geodon since  10Am yesterday, patient did take two doses of abx yesterday.     Elevated lactic acid  - Lactic acid 2.8  - likely due to UTI, Hospitalist consulted   - patient did take two doses of oral bactrim on 2/17      UTI  - UA + for UTI on 2/16  - s/p one dose IM rocephin 100mg on 2/16  - will plan to repeat dose for treatment (patient unwilling to cooperative with PO meds or IV)   - 2/18 goal is get patient to take another dose of PO bactrim, if not, may need repeat IM rocephin   - Hospitalist consulted      Factor V leiden  -has not been on anticoagulation for 10 years  -bilateral  US to rule out blood clots negative on 2/11     Vertigo  -PT to test BPPV  -Meclizine  12.5mg BID  -holding meclizine     CAD  -Pavix     COPD  -on 3l at home, currently on 3.5L  -Montelucast     HTN  -Norvasc 10mg daily  Losartan 100mg daily  -2/14- decrease Losartan to 50mg daily due to hypotension and poor po intake  Lopressor 100mg BID  -2/15- refusing BP medication  -2/16- refusing all vitals and bp meds  -continue norvasc, losartan and lopressor  -2/18 intermittmently elevated,hospitalist following      Lumbar back pain  Flexeril and gabapentin  -2/14- DC flexeril and gabapentin due to delerium  -2/15- restarted flexeril 10mg TID PRN as patient triggered by not getting her muscle relaxor  -Holding all pain medications         Neurogenic bladder:  - Timed voids with PVR q4H x3. If PVR > 400mL or if patient is unable to void, straight cath patient.  - 2/17 incontinent      Neurogenic bowel:  -  Colace, Senna BID on admission  - Goal of 1BM/day.  - No documented BM since 2/13    - will neeed to encourage stool softners if able      Mood  Anxiety  -Paxil  -2/14-Wean off Paxil due to delerium. 20mg daily  -Now on Paxil 20mg daily       Pain:  - Neuroceptic - continue tylenol     DVT prophylaxis: Continue Lovenox     GI prophylaxis:  On Prilosec 20mg daily, 2/15 DC pepcid to minimize drug burden       Patient was seen for 36  minutes on unit/floor of which > 50% of time was spent on counseling and coordination of care regarding the above, including prognosis, risk reduction, benefits of treatment, and options for next stage of care, and discussing plan with daughters and nursing     ____________________________________    Kiersten Morton D.O.    ____________________________________

## 2024-02-18 NOTE — PROGRESS NOTES
NURSING DAILY NOTE    Name: Lorraine Leung   Date of Admission: 2/9/2024   Admitting Diagnosis: Acute ischemic left MCA stroke (HCC)  Attending Physician: Carlin Powers M.d.  Allergies: Patient has no known allergies.    Safety  Patient Assist  max assistance  Patient Precautions  Fall Risk  Precaution Comments  [place], pt wears headphones amplifier, O2=3.5L no wean  Bed Transfer Status  Minimal Assist  Toilet Transfer Status   Contact Guard Assist  Assistive Devices  Rails, Wheelchair  Oxygen  Nasal Cannula  Diet/Therapeutic Dining  Current Diet Order   Procedures    Diet Order Diet: Regular     Pill Administration  whole  Agitated Behavioral Scale  37  ABS Level of Severity  Severe Agitation    Fall Risk  Has the patient had a fall this admission?   No  Tiffani Hinton Fall Risk Scoring  34, HIGH RISK  Fall Risk Safety Measures  bed alarm, chair alarm, seatbelt alarm, poor balance, and low vision/ hearing    Vitals  Temperature: 36.6 °C (97.9 °F)  Temp src: Oral  Pulse: 88  Respiration: 18  Blood Pressure : (!) 146/96  Blood Pressure MAP (Calculated): 113 MM HG  BP Location: Right, Upper Arm  Patient BP Position: Supine     Oxygen  Pulse Oximetry: 96 %  O2 (LPM): 2  O2 Delivery Device: Nasal Cannula    Bowel and Bladder  Last Bowel Movement  02/13/24  Stool Type  Type 4: Like a sausage or snake, smooth and soft  Bowel Device  Diaper, Bathroom  Continent  Bladder: Did not void   Bowel: No movement  Bladder Function  Urine Void (mL):  (after taking vitals, offered to take pt to bathroom, pt. replied with a sarcastic lara NO!, asked if there was anything i could get for her...another lara NO!)  Number of Times Voided: 1  Urine Color: Other (Comments) (milky)  Urine Clarity: Cloudy (Unable to see Through)  Urine Odor: Malodorous  Number of Times Incontinent of Urine: 1  Straight Catheter: 425 ml  Wet Diaper Count: 1  Genitourinary Assessment   Bladder Assessment  (WDL):  WDL Except  [place] Catheter: Not Applicable  Urinary Elimination: Incontinence  Urine Color: Other (Comments) (milky)  Urine Clarity: Cloudy (Unable to see Through)  Urine Odor: Malodorous  Number of Bladder Accidents: 1  Total Number of Bladder of Accidents in Last 7 Days: 1  Number of Times Incontinent of Urine: 1  Bladder Device: Diaper  Time Void: Yes  Bladder Scan: Post Void  $ Bladder Scan Results (mL): 70    Skin  Selwyn Score   12  Sensory Interventions   Bed Types:  ([facility])  Skin Preventative Measures: Pillows in Use for Support / Positioning  Moisture Interventions  Moisturizers/Barriers: Barrier Paste, Barrier Wipes      Pain  Pain Rating Scale  2 - Notice Pain, does not interfere with activities  Pain Location  Head, Neck  Pain Location Orientation  Posterior  Pain Interventions   Repositioned, Rest    ADLs    Bathing      Linen Change   Complete  Personal Hygiene  Change Octavia Pads, Perineal Care  Chlorhexidine Bath      Oral Care  Brushed Teeth  Teeth/Dentures     Shave     Nutrition Percentage Eaten  Lunch  Environmental Precautions  Treaded Slipper Socks on Patient, Bed in Low Position (Soma bed)  Patient Turns/Positioning  Patient Turns Self from Side to Side  Patient Turns Assistance/Tolerance     Bed Positions  Bed Locked, Bed Controls On  Head of Bed Elevated  Self regulated      Psychosocial/Neurologic Assessment  Psychosocial Assessment  Psychosocial (WDL):  WDL Except  Patient Behaviors: Anxious, Drowsy  Family Behaviors: No Family Present  Neurologic Assessment  Neuro (WDL): Exceptions to WDL  Level of Consciousness: Alert  Orientation Level: Disoriented to place, Disoriented to time, Disoriented to situation  Cognition: Confused in conversation  Speech: Clear  Pupil Assesment: No  R Pupil Size (mm): 3  R Pupil Shape / Description: Round  R Pupil Reaction: Brisk  L Pupil Size (mm): 3  L Pupil Shape / Description: Round  L Pupil Reaction: Brisk  RUE Motor Response: Responds to  commands  RUE Sensation: Numbness, Tingling  Muscle Strength Right Arm: Good Strength Against Gravity and Moderate Resistance  RLE Motor Response: Responds to commands  RLE Sensation: Numbness, Tingling  Muscle Strength Right Leg: Good Strength Against Gravity and Moderate Resistance  EENT (WDL):  WDL Except    Cardio/Pulmonary Assessment  Edema      Respiratory Breath Sounds  RUL Breath Sounds: Clear  RML Breath Sounds: Clear  RLL Breath Sounds: Diminished  KASEY Breath Sounds: Clear  LLL Breath Sounds: Diminished  Cardiac Assessment   Cardiac (WDL):  WDL Except

## 2024-02-18 NOTE — ASSESSMENT & PLAN NOTE
K+: 3.1 (2/17)  Likely 2nd to diminished appetite and oral intake  S/P KCL 40 meq x 2 doses on 2/17  Note: Pt has been refusing labs  Cont to monitor

## 2024-02-18 NOTE — CARE PLAN
"The patient is Stable - Low risk of patient condition declining or worsening    Shift Goals  Clinical Goals: safety  Patient Goals: safety, sleep      Problem: Fall Risk - Rehab  Goal: Patient will remain free from falls  Outcome: Progressing  Note: Tiffani Hinton Fall risk Assessment Score: 34     High fall risk Interventions   - Alarming seatbelt  - Bed and strip alarm   - Yellow sign by the door   - Yellow wrist band \"Fall risk\"  - Room near to the nurse station  - Do not leave patient unattended in the bathroom  - Fall risk education provided  Patient continue on SOMA with all locks in place. Patient been sleeping with no increased agitation or impulsiveness noted. Continue on an hourly check.      Problem: Infection  Goal: Patient will remain free from infection  Outcome: Progressing  Note: Continue on oral antibiotic for UTI with no adverse reactions noted. Afebrile. Continue on close monitoring.      Problem: Psychosocial  Goal: Patient's level of anxiety will decrease  Outcome: Progressing  Note: Episode of anxiousness during med  pass but able to take all due meds as ordered. Refused to be cleaned at this time but fixed and reposition patient for comfort. Oxygen in place at  2lpm via NC with no sob or respiratory distress noted. Given 2 bottles of water and able to finished it. Continue on close monitoring.      "

## 2024-02-18 NOTE — CARE PLAN
Problem: Mobility  Goal: Patient's capacity to carry out activities will improve  Outcome: Progressing   The patient is Stable - Low risk of patient condition declining or worsening    Shift Goals  Clinical Goals: safety  Patient Goals: safety, sleep    Progress made toward(s) clinical / shift goals:  pt transferring strong with sba/cga, much more lucid and oriented today than previous days    Patient is not progressing towards the following goals:

## 2024-02-18 NOTE — PROGRESS NOTES
Geodon administered IM @ 1000. Patient and nursing assisted by family. Within an hour the patient became cooperative, ate lunch, hydrated with water and Sprite, took medications, performed ADLs with family assistance in the restroom while sitting in wc. Patient was agreeable to PT, she ambulated in the halls with PT. Patient returned to Posey bed and is resting until dinner.

## 2024-02-18 NOTE — THERAPY
Physical Therapy   Daily Treatment     Patient Name: Lorraine Leung  Age:  78 y.o., Sex:  female  Medical Record #: [id number]  Today's Date: 2/17/2024     Precautions  Precautions: Fall Risk  Comments: [place], pt wears headphones amplifier, O2=3.5L no wean    Subjective    Family, RN, and MD reporting pt is alert and eager to participate in therapy. Initiated impromptu session as pt had been inappropriate for therapy the majority of the past week     Objective       02/17/24 1430   PT Charge Group   PT Gait Training (Units) 1   PT Therapeutic Exercise (Units) 1   PT Total Time Spent   PT Individual Total Time Spent (Mins) 30   Gait Functional Level of Assist    Gait Level Of Assist Minimal Assist   Assistive Device Front Wheel Walker   Distance (Feet) 15   # of Times Distance was Traveled 2   Deviation Bradykinetic;Decreased Heel Strike;Decreased Toe Off;Increased Base Of Support;Step To  (decr R stance stability)   Transfer Functional Level of Assist   Bed, Chair, Wheelchair Transfer Minimal Assist   Bed Chair Wheelchair Transfer Description Adaptive equipment;Set-up of equipment;Supervision for safety;Verbal cueing;Increased time;Initial preparation for task  (stand step FWW)   Sitting Lower Body Exercises   Nustep Resistance Level 4  (B UE/LE 15 min)   Bed Mobility    Sit to Stand Minimal Assist   Scooting Moderate Assist  (EOB)   Interdisciplinary Plan of Care Collaboration   IDT Collaboration with  Nursing;Physician;Family / Caregiver;Therapy Tech   Patient Position at End of Therapy Seated;Family / Friend in Room   Collaboration Comments handoff to tech     Handoff to therapy tech to complete Nustep activity and standing calf stretch while therapist supervised while in gym with another pt. O2 sats 96% on 3L O2 with activity    Assessment    Pt was significantly more alert and agreeable today. She was smiling and joking throughout session. Pt continues to have R hemiplegia and mild R neglect, in addition to decr  endurance from limited recent activity.  Strengths: Able to follow instructions, Adequate strength, Alert and oriented, Effective communication skills, Independent prior level of function, Motivated for self care and independence, Manages pain appropriately, Pleasant and cooperative, Supportive family, Willingly participates in therapeutic activities  Barriers: Fatigue, Decreased endurance, Hearing impairment, Hemiplegia, Impaired activity tolerance, Impaired balance, Pain    Plan    Check O2 saturation with exercise  Education in exercise with pacing  Transfers  Ambulation with FWW  Scanning R with activity    DME  PT DME Recommendations  Assistive Device: Front Wheeled Walker, Single Point Cane    Passport items to be completed:  Get in/out of bed safely, in/out of a vehicle, safely use mobility device, walk or wheel around home/community, navigate up and down stairs, show how to get up/down from the ground, ensure home is accessible, demonstrate HEP, complete caregiver training    Physical Therapy Problems (Active)       Problem: Balance       Dates: Start:  02/10/24         Goal: STG-Within one week, patient will maintain static standing for at least 2 minutes using FWW with single UE support and CGA to facilitate standing ADLs        Dates: Start:  02/10/24               Problem: Mobility       Dates: Start:  02/10/24         Goal: STG-Within one week, patient will ambulate household distance of 50 feet using FWW CGA        Dates: Start:  02/10/24            Goal: STG-Within one week, patient will ascend and descend three to four stairs CGA       Dates: Start:  02/10/24               Problem: PT-Long Term Goals       Dates: Start:  02/10/24         Goal: LTG-By discharge, patient will tolerate standing for at least 5 minutes SPV with LRAD in order to facilitate standing ADLs       Dates: Start:  02/10/24            Goal: LTG-By discharge, patient will ambulate community distances of 150' SPV with LRAD        Dates: Start:  02/10/24            Goal: LTG-By discharge, patient will transfer one surface to another SPV using LRAD        Dates: Start:  02/10/24            Goal: LTG-By discharge, patient will ambulate up/down 4-6 stairs SPV using bilateral hand rails       Dates: Start:  02/10/24

## 2024-02-18 NOTE — PROGRESS NOTES
Received shift report and assumed care of patient.  Patient asleep, currently positioned in bed for comfort and safety; call light within reach.  Appears comfortable at this time.  Will continue to monitor.

## 2024-02-18 NOTE — PROGRESS NOTES
Pt much more alert, oriented, and cooperative than previous shifts. Agreeable to care. Medicated with scheduled medications. Notified MD of urine culture results. Daughter at bedside. Will continue to monitor.

## 2024-02-18 NOTE — FLOWSHEET NOTE
02/18/24 1311   Events/Summary/Plan   Events/Summary/Plan mdis   Vital Signs   Pulse 72   Respiration 16   Pulse Oximetry 93 %   $ Pulse Oximetry (Spot Check) Yes   Respiratory Assessment   Level of Consciousness Alert   Chest Exam   Work Of Breathing / Effort Within Normal Limits   Breath Sounds   RUL Breath Sounds Diminished   RML Breath Sounds Diminished   RLL Breath Sounds Diminished   KASEY Breath Sounds Diminished   LLL Breath Sounds Diminished   Secretions   Cough Non Productive   Oxygen   O2 (LPM) 2   O2 Delivery Device Silicone Nasal Cannula

## 2024-02-18 NOTE — PROGRESS NOTES
Patient refused to be drawn with blood this morning and stated that we need to get out. Informed NOC supervisor and made aware. Will notify am nurse on duty.

## 2024-02-18 NOTE — FLOWSHEET NOTE
02/17/24 1620   Events/Summary/Plan   Events/Summary/Plan mdis   Vital Signs   Pulse (!) 118   Respiration 18   Pulse Oximetry 95 %   $ Pulse Oximetry (Spot Check) Yes   Respiratory Assessment   Level of Consciousness Alert   Chest Exam   Work Of Breathing / Effort Within Normal Limits   Breath Sounds   RUL Breath Sounds Clear   RML Breath Sounds Clear   RLL Breath Sounds Diminished   KASEY Breath Sounds Clear   LLL Breath Sounds Diminished   Secretions   Cough Non Productive   Oxygen   O2 (LPM) 2   O2 Delivery Device Silicone Nasal Cannula

## 2024-02-18 NOTE — PROGRESS NOTES
Hospital Medicine Daily Progress Note      Chief Complaint  Altered Mental Status    Interval Problem Update  Pt refused F/U labs this morning.  Microbiology reviewed and discussed.    Review of Systems  Review of Systems   Unable to perform ROS: Mental status change        Physical Exam  Temp:  [36.6 °C (97.9 °F)-37 °C (98.6 °F)] 36.8 °C (98.3 °F)  Pulse:  [] 88  Resp:  [18-20] 18  BP: ()/(52-96) 146/96  SpO2:  [92 %-96 %] 93 %    Physical Exam  Vitals reviewed.   Constitutional:       General: She is not in acute distress.     Appearance: She is not ill-appearing.   HENT:      Head: Normocephalic and atraumatic.      Right Ear: External ear normal.      Left Ear: External ear normal.      Nose: Nose normal.      Mouth/Throat:      Pharynx: Oropharynx is clear.   Eyes:      General:         Right eye: No discharge.         Left eye: No discharge.   Cardiovascular:      Rate and Rhythm: Normal rate and regular rhythm.   Pulmonary:      Effort: Pulmonary effort is normal. No respiratory distress.      Breath sounds: Normal breath sounds. No wheezing.   Abdominal:      General: Bowel sounds are normal. There is no distension.      Palpations: Abdomen is soft.      Tenderness: There is no abdominal tenderness.   Musculoskeletal:      Cervical back: Normal range of motion and neck supple.      Right lower leg: No edema.      Left lower leg: No edema.   Skin:     General: Skin is warm and dry.   Neurological:      Comments: Resting comfortably         Fluids    Intake/Output Summary (Last 24 hours) at 2/18/2024 0988  Last data filed at 2/18/2024 0518  Gross per 24 hour   Intake 470 ml   Output --   Net 470 ml       Laboratory  Recent Labs     02/17/24  1205   WBC 7.6   RBC 4.04*   HEMOGLOBIN 12.1   HEMATOCRIT 37.1   MCV 91.8   MCH 30.0   MCHC 32.6   RDW 40.4   PLATELETCT 321   MPV 9.5     Recent Labs     02/17/24  1205   SODIUM 138   POTASSIUM 3.1*   CHLORIDE 100   CO2 20   GLUCOSE 157*   BUN 17   CREATININE  0.81   CALCIUM 8.9                 Assessment/Plan  * Acute ischemic left MCA stroke (HCC)- (present on admission)  Assessment & Plan  On ASA, Plavix, and Lipitor  Ongoing management per Physiatry    Hypertension  Assessment & Plan  Observe blood pressure trends on Norvasc, Losartan, and Metoprolol    Hypokalemia  Assessment & Plan  K+ repleted 2/17/24  Pt refused F/U labs this AM    AMS (altered mental status)  Assessment & Plan  PCT <0.05  NH3 16  UA packed WBC w/ mod bacteria  UCx initially reported as negative, subsequently reported as >100,000 Citrobacter  BCx x 2 NGTD  Pending CXR  CT Head negative acute  Continue Bactrim as initiated by Dr. Powers, pending final urine C+S  Also on Zyprexa, Trazodone, and prn Geodon per Physiatry    CAD (coronary artery disease)  Assessment & Plan  S/P NSTEMI  Echo 1/7/24 EF 45%, grade III DD  On ASA, Plavix, Lipitor, Losartan, and Metoprolol  Pending BNP and CXR    COPD (chronic obstructive pulmonary disease) ([facility])  Assessment & Plan  Reportedly on chronic supplemental O2 at 3 lpm via NC  Continue Dulera, Spiriva, and Singulair  RT protocol    Factor V Leiden ([facility])  Assessment & Plan  Not on chronic anticoagulation  U/S BLE 2/11/24 negative for DVT  Recommend Hematology eval    Anxiety  Assessment & Plan  Chronically on Paxil    Full Code    Discussed w/ pt's family, RN (Shirin), and Dr. Morton

## 2024-02-19 ENCOUNTER — APPOINTMENT (OUTPATIENT)
Dept: PHYSICAL THERAPY | Facility: REHABILITATION | Age: 79
DRG: 056 | End: 2024-02-19
Attending: PHYSICAL MEDICINE & REHABILITATION
Payer: MEDICARE

## 2024-02-19 ENCOUNTER — APPOINTMENT (OUTPATIENT)
Dept: OCCUPATIONAL THERAPY | Facility: REHABILITATION | Age: 79
DRG: 056 | End: 2024-02-19
Attending: PHYSICAL MEDICINE & REHABILITATION
Payer: MEDICARE

## 2024-02-19 ENCOUNTER — APPOINTMENT (OUTPATIENT)
Dept: SPEECH THERAPY | Facility: REHABILITATION | Age: 79
DRG: 056 | End: 2024-02-19
Attending: PHYSICAL MEDICINE & REHABILITATION
Payer: MEDICARE

## 2024-02-19 LAB
BACTERIA UR CULT: ABNORMAL
BACTERIA UR CULT: ABNORMAL
SIGNIFICANT IND 70042: ABNORMAL
SITE SITE: ABNORMAL
SOURCE SOURCE: ABNORMAL

## 2024-02-19 PROCEDURE — 770010 HCHG ROOM/CARE - REHAB SEMI PRIVAT*

## 2024-02-19 PROCEDURE — A9270 NON-COVERED ITEM OR SERVICE: HCPCS | Performed by: PHYSICAL MEDICINE & REHABILITATION

## 2024-02-19 PROCEDURE — 700101 HCHG RX REV CODE 250: Performed by: PHYSICAL MEDICINE & REHABILITATION

## 2024-02-19 PROCEDURE — 99232 SBSQ HOSP IP/OBS MODERATE 35: CPT | Performed by: HOSPITALIST

## 2024-02-19 PROCEDURE — 700111 HCHG RX REV CODE 636 W/ 250 OVERRIDE (IP): Performed by: PHYSICAL MEDICINE & REHABILITATION

## 2024-02-19 PROCEDURE — 700102 HCHG RX REV CODE 250 W/ 637 OVERRIDE(OP): Performed by: PHYSICAL MEDICINE & REHABILITATION

## 2024-02-19 RX ORDER — ZIPRASIDONE MESYLATE 20 MG/ML
10 INJECTION, POWDER, LYOPHILIZED, FOR SOLUTION INTRAMUSCULAR
Status: DISCONTINUED | OUTPATIENT
Start: 2024-02-19 | End: 2024-02-19

## 2024-02-19 RX ORDER — CEFTRIAXONE 1 G/1
1000 INJECTION, POWDER, FOR SOLUTION INTRAMUSCULAR; INTRAVENOUS DAILY
Status: DISCONTINUED | OUTPATIENT
Start: 2024-02-19 | End: 2024-02-19

## 2024-02-19 RX ORDER — CEFTRIAXONE 1 G/1
1 INJECTION, POWDER, FOR SOLUTION INTRAMUSCULAR; INTRAVENOUS DAILY
Status: DISCONTINUED | OUTPATIENT
Start: 2024-02-20 | End: 2024-02-21

## 2024-02-19 RX ORDER — WATER 10 ML/10ML
3.6 INJECTION INTRAMUSCULAR; INTRAVENOUS; SUBCUTANEOUS DAILY
Status: DISCONTINUED | OUTPATIENT
Start: 2024-02-19 | End: 2024-02-19

## 2024-02-19 RX ORDER — ZIPRASIDONE MESYLATE 20 MG/ML
10 INJECTION, POWDER, LYOPHILIZED, FOR SOLUTION INTRAMUSCULAR
Status: DISCONTINUED | OUTPATIENT
Start: 2024-02-19 | End: 2024-02-23 | Stop reason: HOSPADM

## 2024-02-19 RX ORDER — GAUZE BANDAGE 2" X 2"
100 BANDAGE TOPICAL DAILY
Status: DISCONTINUED | OUTPATIENT
Start: 2024-02-19 | End: 2024-02-23 | Stop reason: HOSPADM

## 2024-02-19 RX ORDER — LIDOCAINE HYDROCHLORIDE 10 MG/ML
2.1 INJECTION, SOLUTION EPIDURAL; INFILTRATION; INTRACAUDAL; PERINEURAL DAILY
Status: DISCONTINUED | OUTPATIENT
Start: 2024-02-20 | End: 2024-02-21

## 2024-02-19 RX ORDER — CEFDINIR 300 MG/1
300 CAPSULE ORAL EVERY 12 HOURS
Status: DISCONTINUED | OUTPATIENT
Start: 2024-02-19 | End: 2024-02-19

## 2024-02-19 RX ORDER — OLANZAPINE 2.5 MG/1
5 TABLET, FILM COATED ORAL 2 TIMES DAILY
Status: DISCONTINUED | OUTPATIENT
Start: 2024-02-19 | End: 2024-02-22

## 2024-02-19 RX ADMIN — ENOXAPARIN SODIUM 40 MG: 100 INJECTION SUBCUTANEOUS at 17:37

## 2024-02-19 RX ADMIN — METOPROLOL TARTRATE 100 MG: 25 TABLET, FILM COATED ORAL at 17:38

## 2024-02-19 RX ADMIN — CEFTRIAXONE SODIUM 1000 MG: 1 INJECTION, POWDER, FOR SOLUTION INTRAMUSCULAR; INTRAVENOUS at 15:47

## 2024-02-19 RX ADMIN — ATORVASTATIN CALCIUM 40 MG: 40 TABLET, FILM COATED ORAL at 17:40

## 2024-02-19 RX ADMIN — OLANZAPINE 5 MG: 2.5 TABLET, FILM COATED ORAL at 17:38

## 2024-02-19 RX ADMIN — Medication 3 MG: at 17:39

## 2024-02-19 RX ADMIN — WATER 3.6 ML: 1 INJECTION INTRAMUSCULAR; INTRAVENOUS; SUBCUTANEOUS at 15:47

## 2024-02-19 RX ADMIN — MONTELUKAST 10 MG: 10 TABLET, FILM COATED ORAL at 17:38

## 2024-02-19 RX ADMIN — ZIPRASIDONE MESYLATE 5 MG: 20 INJECTION, POWDER, LYOPHILIZED, FOR SOLUTION INTRAMUSCULAR at 10:32

## 2024-02-19 RX ADMIN — Medication 100 MG: at 17:39

## 2024-02-19 RX ADMIN — SENNOSIDES AND DOCUSATE SODIUM 2 TABLET: 50; 8.6 TABLET ORAL at 17:38

## 2024-02-19 RX ADMIN — TRAZODONE HYDROCHLORIDE 50 MG: 50 TABLET ORAL at 20:11

## 2024-02-19 RX ADMIN — ACETAMINOPHEN 650 MG: 325 TABLET ORAL at 17:39

## 2024-02-19 NOTE — THERAPY
Missed Therapy    Patient Name: Lorraine Leung  Age:  78 y.o., Sex:  female  Medical Record #: [id number]  Today's Date: 2/19/2024    Discussed missed therapy with physician and therapy . Patient supine in bed, with posey vest zipped when approached for therapy. Patient became very angry and agitated when approached. Will continue with physical therapy POC as patient tolerates.        02/19/24 0931   Therapy Missed   Missed Therapy (Minutes) 60   Reason For Missed Therapy Medical - Patient on Hold from Therapy;Medical - Patient Agitated   Interdisciplinary Plan of Care Collaboration   IDT Collaboration with  Physician;Therapy Tech   Patient Position at End of Therapy In Bed;Bed Alarm On;Tree Vest Applied;Call Light within Reach   Collaboration Comments Collaborated with MD re: patient's increased agitation when attempted therapy

## 2024-02-19 NOTE — THERAPY
"Missed Therapy    Patient Name: Lorraine Leung  Age:  78 y.o., Sex:  female  Medical Record #: [id number]  Today's Date: 2/19/2024    Discussed missed therapy with nursing, physician. SLP entering the room, pt continues to demonstrate confusion, laughing as therapist introduced self and stating \"you're having a great time back there, girly.\" Pt stating multiple times to \"get away from me.\" Increased agitation when SLP attempted to unzip Soma bed to assist patient with lunch tray. At which time pt refused to answer any further questions and this SLP felt it was not appropriate to continue to attempt therapy given agitation. MD yancey for med hold   02/19/24 1234   Therapy Missed   Missed Therapy (Minutes) 60   Reason For Missed Therapy Medical - Patient on Hold from Therapy   Interdisciplinary Plan of Care Collaboration   IDT Collaboration with  Nursing;Physician   Patient Position at End of Therapy In Bed;Call Light within Reach   Collaboration Comments CLOF and missed therapy with nursing and physician 2/2 increased agitation, refusal to engage with SLP           "

## 2024-02-19 NOTE — CARE PLAN
Problem: Skin Integrity  Goal: Skin integrity is maintained or improved  Outcome: Progressing  Note: Patient's skin remains intact and free from new or accidental injury this shift.  Will continue to monitor.   The patient is Stable - Low risk of patient condition declining or worsening    Shift Goals  Clinical Goals: Safety  Patient Goals: Sleep well    Progress made toward(s) clinical / shift goals:  pt skin intact, no new injury or breakdown    Patient is not progressing towards the following goals:

## 2024-02-19 NOTE — PROGRESS NOTES
Pt remains belligerent and agitated, uncooperative. Daughter at bedside, but patient is not cooperative with her either. Will monitor.

## 2024-02-19 NOTE — PROGRESS NOTES
Received shift report and assumed care of patient.  Patient asleep and stable, currently positioned in bed for comfort and safety; call light within reach.  Appears comfortable at this time.  Will continue to monitor.

## 2024-02-19 NOTE — PROGRESS NOTES
Pt medicated with scheduled meds. Up to bathroom. Brief changed. Pt very pleasant and agreeing to go to the cafeteria for dinner.

## 2024-02-19 NOTE — PROGRESS NOTES
NURSING DAILY NOTE    Name: Lorraine Leung   Date of Admission: 2/9/2024   Admitting Diagnosis: Acute ischemic left MCA stroke (HCC)  Attending Physician: Carlin Powers M.d.  Allergies: Patient has no known allergies.    Safety  Patient Assist  Min Assist  Patient Precautions  Fall Risk  Precaution Comments  [place], pt wears headphones amplifier, O2=3.5L no wean  Bed Transfer Status  Minimal Assist  Toilet Transfer Status   Contact Guard Assist  Assistive Devices  Wheelchair, Rails  Oxygen  Silicone Nasal Cannula  Diet/Therapeutic Dining  Current Diet Order   Procedures    Diet Order Diet: Regular     Pill Administration  whole  Agitated Behavioral Scale  37  ABS Level of Severity  Severe Agitation    Fall Risk  Has the patient had a fall this admission?   No  Tiffani Hinton Fall Risk Scoring  27, HIGH RISK  Fall Risk Safety Measures  posey bed     Vitals  Temperature: 37.1 °C (98.7 °F)  Temp src: Axillary  Pulse: 84  Respiration: 18  Blood Pressure : (!) 146/106  Blood Pressure MAP (Calculated): 119 MM HG  BP Location: Left, Upper Arm  Patient BP Position: Supine     Oxygen  Pulse Oximetry: 93 %  O2 (LPM): 2  O2 Delivery Device: Silicone Nasal Cannula    Bowel and Bladder  Last Bowel Movement  02/16/24 (per daughter)  Stool Type  Type 4: Like a sausage or snake, smooth and soft  Bowel Device  Bathroom, Other (Comment) (Bowel Meds)  Continent  Bladder: Did not void   Bowel: No movement  Bladder Function  Urine Void (mL):  (after taking vitals, offered to take pt to bathroom, pt. replied with a sarcastic lara NO!, asked if there was anything i could get for her...another lara NO!)  Number of Times Voided: 1  Urinary Options: Yes  Urine Color: Yellow  Urine Clarity: Cloudy (Unable to see Through)  Urine Odor: Malodorous  Number of Times Incontinent of Urine: 1  Straight Catheter: 425 ml  Wet Diaper Count: 1  Genitourinary Assessment   Bladder Assessment (WDL):   WDL Except  V[place]la Catheter: Not Applicable  Urinary Elimination: Incontinence  Urine Color: Yellow  Urine Clarity: Cloudy (Unable to see Through)  Urine Odor: Malodorous  Number of Bladder Accidents: 1  Total Number of Bladder of Accidents in Last 7 Days: 1  Number of Times Incontinent of Urine: 1  Bladder Device: Diaper  Time Void: Yes  Bladder Scan: Post Void  $ Bladder Scan Results (mL): 70    Skin  Selwyn Score   17  Sensory Interventions   Bed Types: Other (Comments) ([facility] bed.)  Skin Preventative Measures: Pillows in Use for Support / Positioning  Moisture Interventions  Moisturizers/Barriers: Barrier Wipes      Pain  Pain Rating Scale  0 - No Pain  Pain Location  Head, Neck  Pain Location Orientation  Posterior  Pain Interventions   Declines    ADLs    Bathing   Shower, * * With Assistance from, Staff  Linen Change   Complete  Personal Hygiene  Perineal Care  Chlorhexidine Bath      Oral Care  Brushed Teeth  Teeth/Dentures     Shave     Nutrition Percentage Eaten  *  * Meal *  *, Dinner, Between 50-75% Consumed  Environmental Precautions  Treaded Slipper Socks on Patient, Bed in Low Position  Patient Turns/Positioning  Patient Turns Self from Side to Side  Patient Turns Assistance/Tolerance  Assistance of Two or More, General Weakness  Bed Positions  Bed Controls On, Bed Locked  Head of Bed Elevated  Self regulated      Psychosocial/Neurologic Assessment  Psychosocial Assessment  Psychosocial (WDL):  WDL Except  Patient Behaviors: Anxious, Confused  Family Behaviors: No Family Present  Neurologic Assessment  Neuro (WDL): Exceptions to WDL  Level of Consciousness: Alert  Orientation Level: Disoriented to time, Disoriented to situation  Cognition: Confused in conversation, Impulsive, Poor safety awareness, Unable to follow commands  Speech: Clear  Pupil Assesment: Yes  R Pupil Size (mm): 3  R Pupil Shape / Description: Round  R Pupil Reaction: Brisk  L Pupil Size (mm): 3  L Pupil Shape / Description: Round  L  Pupil Reaction: Brisk  RUE Motor Response: Responds to commands  RUE Sensation: Numbness, Tingling  Muscle Strength Right Arm: Good Strength Against Gravity and Moderate Resistance  RLE Motor Response: Responds to commands  RLE Sensation: Numbness, Tingling  Muscle Strength Right Leg: Good Strength Against Gravity and Moderate Resistance  EENT (WDL):  WDL Except    Cardio/Pulmonary Assessment  Edema      Respiratory Breath Sounds  RUL Breath Sounds: Clear, Diminished  RML Breath Sounds: Clear, Diminished  RLL Breath Sounds: Clear, Diminished  KASEY Breath Sounds: Clear, Diminished  LLL Breath Sounds: Clear, Diminished  Cardiac Assessment   Cardiac (WDL):  WDL Except (H/O HTN, CAD)

## 2024-02-19 NOTE — PROGRESS NOTES
Pt refused change of linen, incontinent of bladder.  Also refused medications and lab draws this morning.  Combative with staff.  Despite explaining to pt plan of care.  Needed 4 staff members to do complete change of linen.  Will continue to monitor patient.

## 2024-02-19 NOTE — CARE PLAN
Problem: Dressing  Goal: STG-Within one week, patient will dress UB at Mod Indep  Outcome: Not Met  Goal: STG-Within one week, patient will dress LB at Sup via AE/DME PRN  Outcome: Not Met     Problem: Toileting  Goal: STG-Within one week, patient will complete toileting tasks at Sup via AE/DME PRN - including management of disposable briefs and hygiene.  Outcome: Not Met     Problem: Functional Transfers  Goal: STG-Within one week, patient will transfer to toilet at Sup via DME  Outcome: Not Met     Problem: IADL's  Goal: STG-Within one week, patient will prepare a simple meal at Min assist via AE/DME PRN.  Outcome: Not Met

## 2024-02-19 NOTE — PROGRESS NOTES
Received bedside shift report from Shirin SHEETS RN regarding patient and assumed care. Patient awake, calm and stable, currently positioned in bed for comfort and safety; call light within reach. Denies pain or discomfort at this time. Will continue to monitor.

## 2024-02-19 NOTE — CARE PLAN
"  Problem: Knowledge Deficit - Standard  Goal: Patient and family/care givers will demonstrate understanding of plan of care, disease process/condition, diagnostic tests and medications  Outcome: Not Met  Note: Pt agrees with plan of care tonight regarding medications and safety.  Will continue to monitor patient.  Pt is confused, unable to retain information.  Poor short term memory.     Problem: Fall Risk - Rehab  Goal: Patient will remain free from falls  Outcome: Not Met  Note: Tiffani Hinton Fall risk Assessment Score:  27    High fall risk Interventions   - Alarming seatbelt  - Wander guard  - Bed and strip alarm   - Yellow sign by the door   - Yellow wrist band \"Fall risk\"  - Room near to the nurse station  - Do not leave patient unattended in the bathroom  - Fall risk education provided        The patient is Stable - Low risk of patient condition declining or worsening    Shift Goals  Clinical Goals: Safety  Patient Goals: Sleep well        Patient is not progressing towards the following goals:      Problem: Knowledge Deficit - Standard  Goal: Patient and family/care givers will demonstrate understanding of plan of care, disease process/condition, diagnostic tests and medications  Outcome: Not Met  Note: Pt agrees with plan of care tonight regarding medications and safety.  Will continue to monitor patient.  Pt is confused, unable to retain information.  Poor short term memory.     Problem: Fall Risk - Rehab  Goal: Patient will remain free from falls  Outcome: Not Met  Note: Tiffani Hinton Fall risk Assessment Score:  27    High fall risk Interventions   - Alarming seatbelt  - Wander guard  - Bed and strip alarm   - Yellow sign by the door   - Yellow wrist band \"Fall risk\"  - Room near to the nurse station  - Do not leave patient unattended in the bathroom  - Fall risk education provided          "

## 2024-02-19 NOTE — THERAPY
Missed Therapy    Patient Name: Lorraine Leung  Age:  78 y.o., Sex:  female  Medical Record #: [id number]  Today's Date: 2/19/2024    Discussed missed therapy with  and MD.     Patient in posey bed (zipped) upon arrival; presented agitated. OT attempted to engaged patient in various activities (ie ADLs, therex, functional cog/games) however pt adamantly declined. MD aware.      02/19/24 1401   Therapy Missed   Missed Therapy (Minutes) 60   Reason For Missed Therapy Medical - Patient on Hold from Therapy  (agitation)   Interdisciplinary Plan of Care Collaboration   IDT Collaboration with  Physician   Patient Position at End of Therapy In Bed   Collaboration Comments medical hold

## 2024-02-19 NOTE — THERAPY
Recreational Therapy   Initial Evaluation     Patient Name: Lorraine Leung  Age:  78 y.o., Sex:  female  Medical Record #: [id number]  Today's Date: 2/19/2024     Subjective    Objective       02/19/24 0901   Procedural Tracking   Procedural Tracking   (Chart review)   Treatment Time   Total Time Spent (mins) 0         Assessment  Patient is 78 y.o. female with a diagnosis of Acute ischemic left MCA stroke .  Additional factors influencing patient status / progress (ie: cognitive factors, co-morbidities, social support, etc): Patient has factor V, CAD, vertigo, anxiety, hypoxia on 3l baseline found down on 1/6. MRI showed large Left MCA stroke. No TPA or thrombectomy was administered. Patient went to SNF and is now admitted to rehabilitation.        Plan  Certified Therapeutic Recreation Specialist has screened this patient and determined that no skilled Recreation Therapy services are indicated at this time due to length of stay and returning to leisure participation will be guided. Thank you for your referral. If a change in patient's function should occur, Recreation Therapy can reevaluate for appropriateness at that time.          Goals:  N/A

## 2024-02-19 NOTE — FLOWSHEET NOTE
"   02/19/24 0928   Events/Summary/Plan   Events/Summary/Plan Attempted to check SpO2 and administer Spiriva and Dulera.  Pt uncooperative and combative. I was told to \"get away from me\".  Will retry after 1 hr of PT.       "

## 2024-02-19 NOTE — PROGRESS NOTES
Pt remains combative and agitated. Updated Dr. Powers on patients condition. Medicated with Geodon. Will continue to monitor.

## 2024-02-19 NOTE — PROGRESS NOTES
NURSING DAILY NOTE    Name: Lorraine Leung   Date of Admission: 2/9/2024   Admitting Diagnosis: Acute ischemic left MCA stroke (HCC)  Attending Physician: Carlin Powers M.d.  Allergies: Patient has no known allergies.    Safety  Patient Assist  min a  Patient Precautions  Fall Risk  Precaution Comments  [place], pt wears headphones amplifier, O2=3.5L no wean  Bed Transfer Status  Minimal Assist  Toilet Transfer Status   Contact Guard Assist  Assistive Devices  Wheelchair, Rails  Oxygen  Silicone Nasal Cannula  Diet/Therapeutic Dining  Current Diet Order   Procedures    Diet Order Diet: Regular     Pill Administration  whole  Agitated Behavioral Scale  37  ABS Level of Severity  Severe Agitation    Fall Risk  Has the patient had a fall this admission?   No  Tiffani Hinton Fall Risk Scoring  27, HIGH RISK  Fall Risk Safety Measures  bed alarm, chair alarm, seatbelt alarm, and posey bed     Vitals  Temperature: 36.8 °C (98.3 °F)  Temp src: Oral  Pulse: 72  Respiration: 16  Blood Pressure : 108/74  Blood Pressure MAP (Calculated): 85 MM HG  BP Location: Right, Upper Arm  Patient BP Position: Sitting     Oxygen  Pulse Oximetry: 93 %  O2 (LPM): 2  O2 Delivery Device: Silicone Nasal Cannula    Bowel and Bladder  Last Bowel Movement  02/13/24  Stool Type  Type 4: Like a sausage or snake, smooth and soft  Bowel Device  Diaper, Bathroom  Continent  Bladder: Did not void   Bowel: No movement  Bladder Function  Urine Void (mL):  (after taking vitals, offered to take pt to bathroom, pt. replied with a sarcastic lara NO!, asked if there was anything i could get for her...another lara NO!)  Number of Times Voided: 1  Urine Color: Unable To Evaluate  Urine Clarity: Cloudy (Unable to see Through)  Urine Odor: Malodorous  Number of Times Incontinent of Urine: 1  Straight Catheter: 425 ml  Wet Diaper Count: 1  Genitourinary Assessment   Bladder Assessment (WDL):  WDL  Except  Villela Catheter: Not Applicable  Urinary Elimination: Incontinence  Urine Color: Unable To Evaluate  Urine Clarity: Cloudy (Unable to see Through)  Urine Odor: Malodorous  Number of Bladder Accidents: 1  Total Number of Bladder of Accidents in Last 7 Days: 1  Number of Times Incontinent of Urine: 1  Bladder Device: Bathroom, Diaper  Time Void: Yes  Bladder Scan: Post Void  $ Bladder Scan Results (mL): 70    Skin  Selwyn Score   17  Sensory Interventions   Bed Types:  (posey)  Skin Preventative Measures: Pillows in Use for Support / Positioning  Moisture Interventions  Moisturizers/Barriers: Barrier Wipes      Pain  Pain Rating Scale  0 - No Pain  Pain Location  Head, Neck  Pain Location Orientation  Posterior  Pain Interventions   Declines    ADLs    Bathing   Shower, * * With Assistance from, Staff  Linen Change   Complete  Personal Hygiene  Change Octavia Pads, Perineal Care  Chlorhexidine Bath      Oral Care  Brushed Teeth  Teeth/Dentures     Shave     Nutrition Percentage Eaten  Lunch  Environmental Precautions  Treaded Slipper Socks on Patient, Personal Belongings, Wastebasket, Call Bell etc. in Easy Reach, Transferred to Stronger Side, Report Given to Other Health Care Providers Regarding Fall Risk, Bed in Low Position  Patient Turns/Positioning  Patient Turns Self from Side to Side  Patient Turns Assistance/Tolerance     Bed Positions  Bed Controls On  Head of Bed Elevated  Self regulated      Psychosocial/Neurologic Assessment  Psychosocial Assessment  Psychosocial (WDL):  WDL Except  Patient Behaviors:  (pleasant)  Family Behaviors: No Family Present  Neurologic Assessment  Neuro (WDL): Exceptions to WDL  Level of Consciousness: Alert  Orientation Level: Oriented to person, Oriented to place  Cognition: Confused in conversation  Speech: Clear  Pupil Assesment: No  R Pupil Size (mm): 3  R Pupil Shape / Description: Round  R Pupil Reaction: Brisk  L Pupil Size (mm): 3  L Pupil Shape / Description:  Round  L Pupil Reaction: Brisk  RUE Motor Response: Responds to commands  RUE Sensation: Numbness, Tingling  Muscle Strength Right Arm: Good Strength Against Gravity and Moderate Resistance  RLE Motor Response: Responds to commands  RLE Sensation: Numbness, Tingling  Muscle Strength Right Leg: Good Strength Against Gravity and Moderate Resistance  EENT (WDL):  WDL Except    Cardio/Pulmonary Assessment  Edema      Respiratory Breath Sounds  RUL Breath Sounds: Diminished  RML Breath Sounds: Diminished  RLL Breath Sounds: Diminished  KASEY Breath Sounds: Diminished  LLL Breath Sounds: Diminished  Cardiac Assessment   Cardiac (WDL):  WDL Except

## 2024-02-19 NOTE — PROGRESS NOTES
"  Physical Medicine & Rehabilitation Progress Note    Encounter Date: 2/19/2024    Chief Complaint: Weakness    Interval Events (Subjective):  Seen in posey bed. Combative this am. Mentation was improved Sunday and patient states she remembered her poor behavior. She was pleasant enough to go to the dining room yesterday.     Holding all therapy today as patient is combative and paranoid    Objective:  VITAL SIGNS: BP (!) 146/106   Pulse 84   Temp 37.1 °C (98.7 °F) (Axillary)   Resp 18   Ht 1.676 m (5' 6\")   Wt 63 kg (138 lb 14.2 oz)   SpO2 93%   BMI 22.42 kg/m²   Gen: No acute distress, well developed well nourished adult  HEENT: Normal Cephalic Atraumatic, Normal conjunctiva.   CV: warm extremities, well perfused, no edema  Resp: symmetric chest rise, breathing comfortably on room air  Abd: Soft, Non distended  Extremities: normal bulk, no atrophy  Skin: no visible rashes or lesions.   Neuro: alert, awake  Psych: Mood and affect appropriate and congruent    Laboratory Values:  Recent Results (from the past 72 hour(s))   URINALYSIS    Collection Time: 02/16/24  9:00 PM    Specimen: Urine, Cath   Result Value Ref Range    Color Yellow     Character Turbid (A)     Specific Gravity 1.030 <1.035    Ph 6.0 5.0 - 8.0    Glucose Negative Negative mg/dL    Ketones Trace (A) Negative mg/dL    Protein 30 (A) Negative mg/dL    Bilirubin Small (A) Negative    Urobilinogen, Urine 0.2 Negative    Nitrite Negative Negative    Leukocyte Esterase Large (A) Negative    Occult Blood Moderate (A) Negative    Micro Urine Req Microscopic    URINE MICROSCOPIC (W/UA)    Collection Time: 02/16/24  9:00 PM   Result Value Ref Range    WBC Packed (A) /hpf    RBC 10-20 (A) /hpf    Bacteria Moderate (A) None /hpf    Epithelial Cells Rare /hpf    Hyaline Cast 0-2 /lpf   URINE CULTURE-EXISTING-LESS THAN 48 HOURS    Collection Time: 02/16/24  9:00 PM    Specimen: Urine, Straight Cath   Result Value Ref Range    Significant Indicator POS " (POS)     Source UR     Site URINE, STRAIGHT CATH     Culture Result - (A)     Culture Result (A)      Citrobacter freundii complex  >100,000 cfu/mL  [place] count unreliable due to antimicrobial inhibition.         Susceptibility    Citrobacter freundii complex - VALERIANO     Ampicillin >16 Resistant mcg/mL     Amoxicillin/CA >16/8 Resistant mcg/mL     Ceftriaxone <=1 Sensitive mcg/mL     Cefazolin* >16 Resistant mcg/mL      * Breakpoints when Cefazolin is used for therapy of infections  other than uncomplicated UTIs due to Enterobacterales are as  follows:  VALERIANO and Interpretation:  <=2 S  4 I  >=8 R       Ciprofloxacin <=0.25 Sensitive mcg/mL     Cefepime <=2 Sensitive mcg/mL     Cefuroxime <=4 Resistant mcg/mL     Ampicillin/sulbactam <=4/2 Resistant mcg/mL     Tobramycin <=2 Sensitive mcg/mL     Nitrofurantoin <=32 Sensitive mcg/mL     Gentamicin <=2 Sensitive mcg/mL     Levofloxacin <=0.5 Sensitive mcg/mL     Minocycline <=4 Sensitive mcg/mL     Pip/Tazobactam <=8 Sensitive mcg/mL     Trimeth/Sulfa <=0.5/9.5 Sensitive mcg/mL     Tigecycline <=2 Sensitive mcg/mL   BLOOD CULTURE    Collection Time: 02/17/24 12:00 PM    Specimen: Peripheral; Blood   Result Value Ref Range    Significant Indicator NEG     Source BLD     Site PERIPHERAL     Culture Result       No Growth  Note: Blood cultures are incubated for 5 days and  are monitored continuously.Positive blood cultures  are called to the RN and reported as soon as  they are identified.     CBC WITH DIFFERENTIAL    Collection Time: 02/17/24 12:05 PM   Result Value Ref Range    WBC 7.6 4.8 - 10.8 K/uL    RBC 4.04 (L) 4.20 - 5.40 M/uL    Hemoglobin 12.1 12.0 - 16.0 g/dL    Hematocrit 37.1 37.0 - 47.0 %    MCV 91.8 81.4 - 97.8 fL    MCH 30.0 27.0 - 33.0 pg    MCHC 32.6 32.2 - 35.5 g/dL    RDW 40.4 35.9 - 50.0 fL    Platelet Count 321 164 - 446 K/uL    MPV 9.5 9.0 - 12.9 fL    Neutrophils-Polys 63.00 44.00 - 72.00 %    Lymphocytes 24.70 22.00 - 41.00 %    Monocytes 6.50  0.00 - 13.40 %    Eosinophils 4.70 0.00 - 6.90 %    Basophils 1.00 0.00 - 1.80 %    Immature Granulocytes 0.10 0.00 - 0.90 %    Nucleated RBC 0.00 0.00 - 0.20 /100 WBC    Neutrophils (Absolute) 4.80 1.82 - 7.42 K/uL    Lymphs (Absolute) 1.89 1.00 - 4.80 K/uL    Monos (Absolute) 0.50 0.00 - 0.85 K/uL    Eos (Absolute) 0.36 0.00 - 0.51 K/uL    Baso (Absolute) 0.08 0.00 - 0.12 K/uL    Immature Granulocytes (abs) 0.01 0.00 - 0.11 K/uL    NRBC (Absolute) 0.00 K/uL   Comp Metabolic Panel    Collection Time: 02/17/24 12:05 PM   Result Value Ref Range    Sodium 138 135 - 145 mmol/L    Potassium 3.1 (L) 3.6 - 5.5 mmol/L    Chloride 100 96 - 112 mmol/L    Co2 20 20 - 33 mmol/L    Anion Gap 18.0 (H) 7.0 - 16.0    Glucose 157 (H) 65 - 99 mg/dL    Bun 17 8 - 22 mg/dL    Creatinine 0.81 0.50 - 1.40 mg/dL    Calcium 8.9 8.5 - 10.5 mg/dL    Correct Calcium 9.0 8.5 - 10.5 mg/dL    AST(SGOT) 19 12 - 45 U/L    ALT(SGPT) 13 2 - 50 U/L    Alkaline Phosphatase 136 (H) 30 - 99 U/L    Total Bilirubin 0.3 0.1 - 1.5 mg/dL    Albumin 3.9 3.2 - 4.9 g/dL    Total Protein 7.6 6.0 - 8.2 g/dL    Globulin 3.7 (H) 1.9 - 3.5 g/dL    A-G Ratio 1.1 g/dL   LACTIC ACID    Collection Time: 02/17/24 12:05 PM   Result Value Ref Range    Lactic Acid 2.8 (H) 0.5 - 2.0 mmol/L   PROCALCITONIN    Collection Time: 02/17/24 12:05 PM   Result Value Ref Range    Procalcitonin <0.05 <0.25 ng/mL   AMMONIA    Collection Time: 02/17/24 12:05 PM   Result Value Ref Range    Ammonia 16 11 - 45 umol/L   BLOOD CULTURE    Collection Time: 02/17/24 12:05 PM    Specimen: Peripheral; Blood   Result Value Ref Range    Significant Indicator NEG     Source BLD     Site PERIPHERAL     Culture Result       No Growth  Note: Blood cultures are incubated for 5 days and  are monitored continuously.Positive blood cultures  are called to the RN and reported as soon as  they are identified.     ESTIMATED GFR    Collection Time: 02/17/24 12:05 PM   Result Value Ref Range    GFR (CKD-EPI) 75  >60 mL/min/1.73 m 2       Medications:  Scheduled Medications   Medication Dose Frequency    cefdinir  300 mg Q12HRS    mometasone-formoterol  2 Puff BID (RT)    tiotropium  5 mcg QDAILY (RT)    OLANZapine  5 mg BID    traZODone  50 mg QHS    PARoxetine  20 mg DAILY    losartan  50 mg Q DAY    melatonin  3 mg AFTER DINNER    sulfamethoxazole-trimethoprim  1 Tablet Q12HRS    amLODIPine  10 mg Q DAY    aspirin  81 mg DAILY    atorvastatin  40 mg Q EVENING    clopidogrel  75 mg DAILY    metoprolol tartrate  100 mg BID    montelukast  10 mg Nightly    Pharmacy Consult Request  1 Each PHARMACY TO DOSE    senna-docusate  2 Tablet BID    omeprazole  20 mg DAILY    enoxaparin (LOVENOX) injection  40 mg DAILY AT 1800     PRN medications: ziprasidone, OLANZapine, acetaminophen, Respiratory Therapy Consult, hydrALAZINE, senna-docusate **AND** polyethylene glycol/lytes, ondansetron **OR** ondansetron, sodium chloride    Diet:  Current Diet Order   Procedures    Diet Order Diet: Regular       Medical Decision Making and Plan:  CVA:  - Patient with L MCA stroke on 1/6. Etiology likely Unclear . Received NO intervention  - Continue ASA and statin for secondary stroke prophylaxis.  -Zio patch     Severe Acute metabolic encephalopathy  Verbally and physically agitated towards staff without provokation  -2/16 -Increased Zyprexa to 5mg BID. Hoping to give a dose at night but may need to use GEODON  -Geodon 5mg PRN, 10mg Geodon caused patient to sleep for 3-4hrs. Decreased to 5mg.  -Continue to empirically treat UTI with bactrim. IM Rocephin given on 2/16 as she refused all medications  I certify that the patient currently requires non-pharmacologic restraints. Non-pharmacologic restraints are required to reduce the potential for the patient to harm themselves or others, to limit violent behaviors, and to allow proper assessment and care. I have completed a face to face assessment of this patient on 2/19/2024. Alternative methods  including but not limited to de-escalation techniques, redirection, and pharmacologic treatment have been attempted but patient currently remains at risk without restraints. Our staff has been trained on restraints and patient is currently placed in video monitored room and/or close to the nursing station.  The need for these restraints is assessed by a rehabilitation physician every 24 hours and use of restraints is minimized when appropriate.  Current diagnosis which requires restraints: Acute encephalopathy. Current restraints required: posey.     - 2/18 patient has not required IM geodon since  10Am yesterday, patient did take two doses of abx yesterday.   -2/19- Patient required Geodon at 11am.      Elevated lactic acid  - Lactic acid 2.8  - likely due to UTI, Hospitalist consulted   - patient did take two doses of oral bactrim on 2/17      UTI  - UA + for UTI on 2/16  - s/p one dose IM rocephin 100mg on 2/16  - will plan to repeat dose for treatment (patient unwilling to cooperative with PO meds or IV)   - 2/18 goal is get patient to take another dose of PO bactrim, if not, may need repeat IM rocephin   - 2/19- UA from 2/16 grossly positive. Will send to culture  -Restart Abx. 2/19 start Rocephin 1g daily IM for 5 days. Cultures and sensitivities back     Factor V leiden  -has not been on anticoagulation for 10 years  -bilateral  US to rule out blood clots negative on 2/11     Vertigo  -PT to test BPPV  -Meclizine 12.5mg BID  -holding meclizine     CAD  -Pavix     COPD  -on 3l at home, currently on 3.5L  -Montelucast     HTN  -Norvasc 10mg daily  Losartan 100mg daily  -2/14- decrease Losartan to 50mg daily due to hypotension and poor po intake  Lopressor 100mg BID  -2/15- refusing BP medication  -2/16- refusing all vitals and bp meds  -continue norvasc, losartan and lopressor  -2/18 intermittmently elevated,hospitalist following      Lumbar back pain  Flexeril and gabapentin  -2/14- DC flexeril and gabapentin  due to delerium  -2/15- restarted flexeril 10mg TID PRN as patient triggered by not getting her muscle relaxor  -Holding all pain medications         Neurogenic bladder:  - Timed voids with PVR q4H x3. If PVR > 400mL or if patient is unable to void, straight cath patient.  - 2/17 incontinent      Neurogenic bowel:  -  Colace, Senna BID on admission  - Goal of 1BM/day.  - No documented BM since 2/13    - will neeed to encourage stool softners if able      Mood  Anxiety  -Paxil  -2/14-Wean off Paxil due to delerium. 20mg daily  -Now on Paxil 20mg daily        Pain:  - Neuroceptic - continue tylenol     DVT prophylaxis: continue lovenox     GI prophylaxis:  On Prilosec 20mg daily, 2/15 DC pepcid to minimize drug burden       ____________________________________    Carlin Powers MD  Physical Medicine & Rehabilitation   Brain Injury Medicine   ____________________________________

## 2024-02-19 NOTE — PROGRESS NOTES
Hospital Medicine Daily Progress Note      Chief Complaint  Altered Mental Status    Interval Problem Update  Pt refused F/U labs again this morning.    Review of Systems  Review of Systems   Unable to perform ROS: Mental status change        Physical Exam  Temp:  [37.1 °C (98.7 °F)] 37.1 °C (98.7 °F)  Pulse:  [72-84] 84  Resp:  [16-18] 18  BP: (108-146)/() 146/106  SpO2:  [93 %-94 %] 93 %    Physical Exam  Vitals reviewed.   Constitutional:       General: She is not in acute distress.     Appearance: She is not ill-appearing.   HENT:      Head: Normocephalic and atraumatic.      Right Ear: External ear normal.      Left Ear: External ear normal.      Nose: Nose normal.      Mouth/Throat:      Pharynx: Oropharynx is clear.   Eyes:      General:         Right eye: No discharge.         Left eye: No discharge.   Cardiovascular:      Rate and Rhythm: Normal rate and regular rhythm.   Pulmonary:      Effort: Pulmonary effort is normal. No respiratory distress.      Breath sounds: Normal breath sounds. No wheezing.   Abdominal:      General: Bowel sounds are normal. There is no distension.      Palpations: Abdomen is soft.      Tenderness: There is no abdominal tenderness.   Musculoskeletal:      Cervical back: Normal range of motion and neck supple.      Right lower leg: No edema.      Left lower leg: No edema.   Skin:     General: Skin is warm and dry.   Neurological:      Comments: Resting comfortably         Fluids    Intake/Output Summary (Last 24 hours) at 2/19/2024 0822  Last data filed at 2/18/2024 2046  Gross per 24 hour   Intake 600 ml   Output --   Net 600 ml       Laboratory  Recent Labs     02/17/24  1205   WBC 7.6   RBC 4.04*   HEMOGLOBIN 12.1   HEMATOCRIT 37.1   MCV 91.8   MCH 30.0   MCHC 32.6   RDW 40.4   PLATELETCT 321   MPV 9.5     Recent Labs     02/17/24  1205   SODIUM 138   POTASSIUM 3.1*   CHLORIDE 100   CO2 20   GLUCOSE 157*   BUN 17   CREATININE 0.81   CALCIUM 8.9                  Assessment/Plan  * Acute ischemic left MCA stroke (HCC)- (present on admission)  Assessment & Plan  On ASA, Plavix, and Lipitor  Ongoing management per Physiatry    Hypertension  Assessment & Plan  Observe blood pressure trends on Norvasc, Losartan, and Metoprolol    Hypokalemia  Assessment & Plan  K+ repleted 2/17/24  Pt again refused F/U labs this AM    AMS (altered mental status)  Assessment & Plan  PCT <0.05  NH3 16  UA packed WBC w/ mod bacteria  UCx initially reported as negative by Microbiology, subsequently reported as >100,000 Citrobacter  BCx x 2 NGTD  CXR atx, start IS  CT Head negative acute  Continue Bactrim as initiated by Dr. Powers  Also on Zyprexa, Trazodone, and prn Geodon per Physiatry    CAD (coronary artery disease)  Assessment & Plan  S/P NSTEMI  Echo 1/7/24 EF 45%, grade III DD  On ASA, Plavix, Lipitor, Losartan, and Metoprolol  BNP has been ordered previously but pt refusing lab draws  CXR atx, start IS    COPD (chronic obstructive pulmonary disease) ([facility])  Assessment & Plan  Reportedly on chronic supplemental O2 at 3 lpm via NC  Continue Dulera, Spiriva, and Singulair  RT protocol    Factor V Leiden ([facility])  Assessment & Plan  Not on chronic anticoagulation  U/S BLE 2/11/24 negative for DVT  Recommend Hematology eval    Anxiety  Assessment & Plan  Chronically on Paxil    Full Code

## 2024-02-20 ENCOUNTER — APPOINTMENT (OUTPATIENT)
Dept: SPEECH THERAPY | Facility: REHABILITATION | Age: 79
DRG: 056 | End: 2024-02-20
Attending: PHYSICAL MEDICINE & REHABILITATION
Payer: MEDICARE

## 2024-02-20 ENCOUNTER — APPOINTMENT (OUTPATIENT)
Dept: PHYSICAL THERAPY | Facility: REHABILITATION | Age: 79
DRG: 056 | End: 2024-02-20
Attending: PHYSICAL MEDICINE & REHABILITATION
Payer: MEDICARE

## 2024-02-20 ENCOUNTER — APPOINTMENT (OUTPATIENT)
Dept: OCCUPATIONAL THERAPY | Facility: REHABILITATION | Age: 79
DRG: 056 | End: 2024-02-20
Attending: PHYSICAL MEDICINE & REHABILITATION
Payer: MEDICARE

## 2024-02-20 PROCEDURE — 770010 HCHG ROOM/CARE - REHAB SEMI PRIVAT*

## 2024-02-20 PROCEDURE — 97110 THERAPEUTIC EXERCISES: CPT

## 2024-02-20 PROCEDURE — 94760 N-INVAS EAR/PLS OXIMETRY 1: CPT

## 2024-02-20 PROCEDURE — 700111 HCHG RX REV CODE 636 W/ 250 OVERRIDE (IP): Performed by: PHYSICAL MEDICINE & REHABILITATION

## 2024-02-20 PROCEDURE — 86480 TB TEST CELL IMMUN MEASURE: CPT

## 2024-02-20 PROCEDURE — 700102 HCHG RX REV CODE 250 W/ 637 OVERRIDE(OP): Performed by: PHYSICAL MEDICINE & REHABILITATION

## 2024-02-20 PROCEDURE — 94640 AIRWAY INHALATION TREATMENT: CPT

## 2024-02-20 PROCEDURE — 97130 THER IVNTJ EA ADDL 15 MIN: CPT

## 2024-02-20 PROCEDURE — 97116 GAIT TRAINING THERAPY: CPT

## 2024-02-20 PROCEDURE — 99232 SBSQ HOSP IP/OBS MODERATE 35: CPT | Performed by: HOSPITALIST

## 2024-02-20 PROCEDURE — 97129 THER IVNTJ 1ST 15 MIN: CPT

## 2024-02-20 PROCEDURE — 97530 THERAPEUTIC ACTIVITIES: CPT

## 2024-02-20 PROCEDURE — A9270 NON-COVERED ITEM OR SERVICE: HCPCS | Performed by: PHYSICAL MEDICINE & REHABILITATION

## 2024-02-20 PROCEDURE — 36415 COLL VENOUS BLD VENIPUNCTURE: CPT

## 2024-02-20 RX ORDER — ESCITALOPRAM OXALATE 10 MG/1
10 TABLET ORAL DAILY
Status: DISCONTINUED | OUTPATIENT
Start: 2024-02-21 | End: 2024-02-23 | Stop reason: HOSPADM

## 2024-02-20 RX ORDER — CEFDINIR 300 MG/1
300 CAPSULE ORAL EVERY 12 HOURS
Status: DISCONTINUED | OUTPATIENT
Start: 2024-02-20 | End: 2024-02-20

## 2024-02-20 RX ADMIN — ZIPRASIDONE MESYLATE 10 MG: 20 INJECTION, POWDER, LYOPHILIZED, FOR SOLUTION INTRAMUSCULAR at 05:45

## 2024-02-20 RX ADMIN — Medication 3 MG: at 17:27

## 2024-02-20 RX ADMIN — TIOTROPIUM BROMIDE INHALATION SPRAY 5 MCG: 3.12 SPRAY, METERED RESPIRATORY (INHALATION) at 08:00

## 2024-02-20 RX ADMIN — TRAZODONE HYDROCHLORIDE 50 MG: 50 TABLET ORAL at 20:10

## 2024-02-20 RX ADMIN — PAROXETINE HYDROCHLORIDE 20 MG: 20 TABLET, FILM COATED ORAL at 07:38

## 2024-02-20 RX ADMIN — SENNOSIDES AND DOCUSATE SODIUM 2 TABLET: 50; 8.6 TABLET ORAL at 20:10

## 2024-02-20 RX ADMIN — OLANZAPINE 5 MG: 2.5 TABLET, FILM COATED ORAL at 17:27

## 2024-02-20 RX ADMIN — SENNOSIDES AND DOCUSATE SODIUM 2 TABLET: 50; 8.6 TABLET ORAL at 07:39

## 2024-02-20 RX ADMIN — ENOXAPARIN SODIUM 40 MG: 100 INJECTION SUBCUTANEOUS at 17:27

## 2024-02-20 RX ADMIN — MONTELUKAST 10 MG: 10 TABLET, FILM COATED ORAL at 20:10

## 2024-02-20 RX ADMIN — LIDOCAINE HYDROCHLORIDE 2.1 ML: 10 INJECTION, SOLUTION EPIDURAL; INFILTRATION; INTRACAUDAL; PERINEURAL at 10:44

## 2024-02-20 RX ADMIN — ASPIRIN 81 MG 81 MG: 81 TABLET ORAL at 07:39

## 2024-02-20 RX ADMIN — CEFTRIAXONE SODIUM 1 G: 1 INJECTION, POWDER, FOR SOLUTION INTRAMUSCULAR; INTRAVENOUS at 10:43

## 2024-02-20 RX ADMIN — CLOPIDOGREL BISULFATE 75 MG: 75 TABLET ORAL at 07:39

## 2024-02-20 RX ADMIN — Medication 100 MG: at 07:38

## 2024-02-20 RX ADMIN — ATORVASTATIN CALCIUM 40 MG: 40 TABLET, FILM COATED ORAL at 20:10

## 2024-02-20 RX ADMIN — OMEPRAZOLE 20 MG: 20 CAPSULE, DELAYED RELEASE ORAL at 07:38

## 2024-02-20 RX ADMIN — ACETAMINOPHEN 650 MG: 325 TABLET ORAL at 15:42

## 2024-02-20 RX ADMIN — MOMETASONE FUROATE AND FORMOTEROL FUMARATE DIHYDRATE 2 PUFF: 100; 5 AEROSOL RESPIRATORY (INHALATION) at 08:00

## 2024-02-20 ASSESSMENT — GAIT ASSESSMENTS
ASSISTIVE DEVICE: FRONT WHEEL WALKER
DEVIATION: BRADYKINETIC;INCREASED BASE OF SUPPORT;DECREASED HEEL STRIKE;DECREASED TOE OFF
GAIT LEVEL OF ASSIST: CONTACT GUARD ASSIST
DISTANCE (FEET): 100

## 2024-02-20 ASSESSMENT — ACTIVITIES OF DAILY LIVING (ADL)
BED_CHAIR_WHEELCHAIR_TRANSFER_DESCRIPTION: ADAPTIVE EQUIPMENT;SET-UP OF EQUIPMENT;SUPERVISION FOR SAFETY;VERBAL CUEING;INCREASED TIME

## 2024-02-20 ASSESSMENT — PAIN DESCRIPTION - PAIN TYPE: TYPE: ACUTE PAIN

## 2024-02-20 NOTE — PROGRESS NOTES
Pt remains agitated and belligerent with staff. Orders received for IM Rocephin. Assisted by several nurses, this RN gave the medication to left thigh. Pt combative during injection. Will continue to monitor.

## 2024-02-20 NOTE — PROGRESS NOTES
Anesthesia Evaluation     . Pt has had prior anesthetic. Type: Regional    No history of anesthetic complications          ROS/MED HX    ENT/Pulmonary:     (+)BRE risk factors snores loudly, tobacco use, Past use 0.1 packs/day  , . .    Neurologic:     (+)other neuro RLS    Cardiovascular:     (+) Dyslipidemia, ----. : . . . :. . No previous cardiac testing       METS/Exercise Tolerance:  >4 METS   Hematologic:  - neg hematologic  ROS      (-) history of blood clots and History of Transfusion   Musculoskeletal:   (+) , , other musculoskeletal- periodic central low back pain      GI/Hepatic:     (+) GERD Asymptomatic on medication, bowel prep,       Renal/Genitourinary:  - ROS Renal section negative       Endo:     (+) Obesity, .      Psychiatric:     (+) psychiatric history depression (insomnia, seasonal affective disorder)      Infectious Disease:  - neg infectious disease ROS       Malignancy:   (+) Malignancy History of GI  GI CA Active status post,         Other:    (+) No chance of pregnancy no H/O Chronic Pain,                   Physical Exam  Normal systems: cardiovascular and pulmonary    Airway   Mallampati: II  TM distance: >3 FB  Neck ROM: full    Dental   (+) missing  Comment: Tooth #31 missing, one right upper crown    Cardiovascular   Rhythm and rate: regular and normal      Pulmonary    breath sounds clear to auscultation               PAC Discussion and Assessment    ASA Classification: 3  Case is suitable for: [place]  Anesthetic techniques and relevant risks discussed: GA  Invasive monitoring and risk discussed:   Types:   Possibility and Risk of blood transfusion discussed: Yes  NPO instructions given:   Additional anesthetic preparation and risks discussed:   Needs early admission to pre-op area:   Other:     PAC Resident/NP Anesthesia Assessment:  Sarah Rajan is a 52 year old female scheduled for a Laparoscopic Assisted Low Anterior Resection With Loop Ileostomy and Possible Colostomy,  On  Bedside report given to Shirin SHEETS RN regarding patient and to assume care.   2/14/2018 by Dr. Hampton in treatment of rectal cancer.  PAC referral for risk assessment and optimization for anesthesia with comorbid conditions of: obesity, history of smoking, RLS, GERD, depression, seasonal affective disorder and insomnia.      Pre-operative considerations:  1.  Cardiac:  Functional status- METS >4.  She hasn't been exercising purposefully lately, but up to a few months ago she was doing a run/walk on her treadmill 3 days per week.  She denies any exertional dyspnea.  Intermediate risk surgery with 0.4% risk of major adverse cardiac event.  Risk of MACE < 1%. METS>4.  No further cardiac evaluation needed per 2014 ACC/AHA guidelines for non-cardiac surgery.  2.  Pulm:  Airway feasible.  BRE risk: intermediate.  Quit smoking in 1986.  She is obese with a BMI >30.    3.  GI:  Risk of PONV score = 3.  If > 2, anti-emetic intervention recommended.  GERD is managed well with Tums.      VTE risk:  1.8% due to questionable family history of a maternal aunt with a DVT or PE.      Patient is optimized and is acceptable candidate for the proposed procedure.  No further diagnostic evaluation is needed.     Patient discussed with Dr. Negron.     For further details of assessment, testing, and physical exam please see H and P completed on same date.          Oly Altamirano DNP, RN, APRN      Reviewed and Signed by PAC Mid-Level Provider/Resident  Mid-Level Provider/Resident: Oly Altamirano DNP, RN, APRN  Date: 2/6/2018  Time: 1444    Attending Anesthesiologist Anesthesia Assessment:  I discussed the patient with the SAULO but I did not personally see the patient during this visit.   Patient currently medically optimized for the proposed procedure.   Final anesthetic plan and recommendations to be made by the attending anesthesiologist on the day of surgery.       Reviewed and Signed by PAC Anesthesiologist  Anesthesiologist: CAROLE  Date: 2/6/18  Time:   Pass/Fail: Pass  Disposition:     PAC Pharmacist  Assessment:        Pharmacist:   Date:   Time:                           .

## 2024-02-20 NOTE — PROGRESS NOTES
"Pt was asked this morning if I can take her vital signs and give her, her medications for this morning blood pressure medications and her Zyprexa.  Explained to pt importance of doing these but pt keeps swatting me and telling me \"get out of here.\"  Informed charge nurse Citlaly HEMPHILL RN.  Will continue to monitor patient.  "

## 2024-02-20 NOTE — CARE PLAN
Problem: Balance  Goal: STG-Within one week, patient will maintain static standing for at least 2 minutes using FWW with single UE support and CGA to facilitate standing ADLs   Outcome: Met     Problem: Mobility  Goal: STG-Within one week, patient will ambulate household distance of 50 feet using FWW CGA   Outcome: Met  Goal: STG-Within one week, patient will ascend and descend three to four stairs CGA  Outcome: Met

## 2024-02-20 NOTE — PROGRESS NOTES
Pt daughter at bedside. Pt more calm, but remains suspicious of staff. Medicated with PO meds. Pt sitting in soma, eating dinner. Will monitor.

## 2024-02-20 NOTE — THERAPY
"Physical Therapy   Daily Treatment     Patient Name: Lorraine Leung  Age:  78 y.o., Sex:  female  Medical Record #: [id number]  Today's Date: 2/20/2024     Precautions  Precautions: Fall Risk  Comments: [place], pt wears headphones amplifier, O2=3.5L no wean    Subjective    \"Oh here's my therapy people\" Pt was pleasant, cooperative, and motivated to participate in therapy this morning.      Objective       02/20/24 0831   Vitals   Pulse (!) 101   Pulse Oximetry 96 %   O2 (LPM) 2   O2 Delivery Device Nasal Cannula   Cognition    Level of Consciousness Alert   ABS (Agitated Behavior Scale)   Short Attention Span, Easy Distractibility, Inability to Concentrate 1   Impulsive, Impatient, Low Tolerance for Pain or Frustration 1   Uncooperative, Resistant to Care, Demanding 1   Violent and/or Threatening Violence Toward People or Property 1   Explosive and/or Unpredictable Anger 1   Rocking, Rubbing, Moaning, Other Self-Stimulating Behavior 1   Pulling at Tubes, Restraints, etc. 1   Wandering from Treatment Area 1   Restlessness, Pacing, Excessive Movement 1   Repetitive Behaviors, Motor and/or Verbal 1   Rapid, Loud or Excessive Talking 1   Sudden Changes of Mood 1   Easily Initiated - Excessive Crying and/or Laughter 1   Self-Abusiveness, Physical and/or Verbal 1   Agitated Behavior Scale Total Score 14   Level of Severity No Agitation   Gait Functional Level of Assist    Gait Level Of Assist Contact Guard Assist   Assistive Device Front Wheel Walker   Distance (Feet) 100  (In addition to 20' x 2)   # of Times Distance was Traveled 1   Deviation Bradykinetic;Increased Base Of Support;Decreased Heel Strike;Decreased Toe Off   Transfer Functional Level of Assist   Bed, Chair, Wheelchair Transfer Contact Guard Assist   Bed Chair Wheelchair Transfer Description Adaptive equipment;Set-up of equipment;Supervision for safety;Verbal cueing;Increased time   Sitting Lower Body Exercises   Long Arc Quad 2 sets of 10;Weight (See Comments " "for lbs)  (#2.5)   Nustep Resistance Level 2  (7.5 minutes. .16 miles, 38 SPM)   Bed Mobility    Sit to Stand Contact Guard Assist   Interdisciplinary Plan of Care Collaboration   IDT Collaboration with  Speech Therapist   Patient Position at End of Therapy Seated   Collaboration Comments Handoff to speech therapy     Parallel bars ambulation BUE support (forward and backward) CGA 20' x 2    Figure 8 between cones with visual scanning to the R with FWW (CGA) 20' x 3    Step ups in // bars with BUE support and 4\" step (up with R, down with L) 2x10           Assessment    Pt was pleasant and motivated for therapy this morning. Pt demos increased functional mobility and strength and was CGA with transfers and ambulation today. Pt tolerated session well and took rest breaks when appropriate. Pt still has some difficulty with attenuating to her R side, d/t mild R neglect.     Strengths: Able to follow instructions, Adequate strength, Alert and oriented, Effective communication skills, Independent prior level of function, Motivated for self care and independence, Manages pain appropriately, Pleasant and cooperative, Supportive family, Willingly participates in therapeutic activities  Barriers: Fatigue, Decreased endurance, Hearing impairment, Hemiplegia, Impaired activity tolerance, Impaired balance, Pain    Plan    Check O2 saturation with exercise  Education in exercise with pacing  Transfers  Ambulation with FWW  Scanning R with activity    DME  PT DME Recommendations  Assistive Device: Front Wheeled Walker, Single Point Cane    Passport items to be completed:  Get in/out of bed safely, in/out of a vehicle, safely use mobility device, walk or wheel around home/community, navigate up and down stairs, show how to get up/down from the ground, ensure home is accessible, demonstrate HEP, complete caregiver training    Physical Therapy Problems (Active)       Problem: PT-Long Term Goals       Dates: Start:  02/10/24         " Goal: LTG-By discharge, patient will tolerate standing for at least 5 minutes SPV with LRAD in order to facilitate standing ADLs       Dates: Start:  02/10/24            Goal: LTG-By discharge, patient will ambulate community distances of 150' SPV with LRAD       Dates: Start:  02/10/24            Goal: LTG-By discharge, patient will transfer one surface to another SPV using LRAD        Dates: Start:  02/10/24            Goal: LTG-By discharge, patient will ambulate up/down 4-6 stairs SPV using bilateral hand rails       Dates: Start:  02/10/24

## 2024-02-20 NOTE — FLOWSHEET NOTE
02/20/24 0805   Events/Summary/Plan   Events/Summary/Plan SpO2 check, MDI x 2   Vital Signs   Pulse 70   Respiration 16   Pulse Oximetry 97 %   $ Pulse Oximetry (Spot Check) Yes   Respiratory Assessment   Level of Consciousness Alert   Chest Exam   Work Of Breathing / Effort Within Normal Limits   Oxygen   O2 (LPM) 2   O2 Delivery Device Nasal Cannula

## 2024-02-20 NOTE — PROGRESS NOTES
Physical Medicine & Rehabilitation Progress Note  _____________________________________  Interdisciplinary Team Conference   Most recent IDT on 2/20/2024    ICarlin M.D., was present and led the interdisciplinary team conference on 2/20/2024.  I led the IDT conference and agree with the IDT conference documentation and plan of care as noted below.     Nursing:  Diet Current Diet Order   Procedures    Diet Order Diet: Regular       Eating ADL Supervision  Verbal cueing   % of Last Meal  Oral Nutrition: Breakfast, Less than 25% Consumed (couple bites)   Sleep    Bowel Last BM: 02/16/24   Bladder    Barriers to Discharge Home:Weakness, confusion, aggitation      Physical Therapy:  Bed Mobility    Transfers Contact Guard Assist  Adaptive equipment, Set-up of equipment, Supervision for safety, Verbal cueing, Increased time   Mobility Contact Guard Assist   Stairs    Barriers to Discharge Home:Weakness, confusion, aggitation      Occupational Therapy:  Grooming Supervision (cues to locate items to the right)   Bathing Moderate Assist   UB Dressing Minimal Assist   LB Dressing Moderate Assist (to don pants and  tread socks)   Toileting Moderate Assist   Shower & Transfer    Barriers to Discharge Home:Weakness, confusion, aggitation      Speech-Language Pathology:  Comprehension:  Moderate Assist  Comprehension Description:  Hearing aids/amplifiers, Glasses  Expression:  Independent  Expression Description:  Verbal cueing  Social Interaction:  Maximal Assist  Social Interaction Description:  Enclosure bed, Increased time, Low stimulation environment, Medication, Verbal cues  Problem Solving:  Maximal Assist  Problem Solving Description:  Verbal cueing, Therapy schedule, Bed/chair alarm, Enclosure bed, Increased time  Memory:  Maximal Assist  Memory Description:  Increased time, Enclosure bed, Bed/chair alarm, Verbal cueing, Therapy schedule  Barriers to Discharge Home:Weakness, confusion,  "aggitation    Respiratory Therapy:  O2 (LPM): 2  O2 Delivery Device: Silicone Nasal Cannula    Case Management:  Continues to work on disposition and DME needs.      Discharge Date/Disposition:  2/23/24  _____________________________________    Encounter Date: 2/20/2024    Chief Complaint: Weakness    Interval Events (Subjective):  Seen in bed. Patient had IM Geodon this am then participated in therapy. Calling appropriately. Will trial out of tawana bed.     Objective:  VITAL SIGNS: /70   Pulse 100   Temp 36.6 °C (97.9 °F) (Oral)   Resp 17   Ht 1.676 m (5' 6\")   Wt 63 kg (138 lb 14.2 oz)   SpO2 96%   BMI 22.42 kg/m²   Gen: No acute distress, well developed well nourished adult  HEENT: Normal Cephalic Atraumatic, Normal conjunctiva.   CV: warm extremities, well perfused, no edema  Resp: symmetric chest rise, breathing comfortably on room air  Abd: Soft, Non distended  Extremities: normal bulk, no atrophy  Skin: no visible rashes or lesions.   Neuro: alert, awake  Psych: Mood and affect appropriate and congruent    Laboratory Values:  No results found for this or any previous visit (from the past 72 hour(s)).    Medications:  Scheduled Medications   Medication Dose Frequency    [START ON 2/21/2024] escitalopram  10 mg DAILY    thiamine  100 mg DAILY    OLANZapine  5 mg BID    cefTRIAXone  1 g DAILY    And    lidocaine PF  2.1 mL DAILY    mometasone-formoterol  2 Puff BID (RT)    tiotropium  5 mcg QDAILY (RT)    traZODone  50 mg QHS    [Held by provider] losartan  50 mg Q DAY    melatonin  3 mg AFTER DINNER    [Held by provider] amLODIPine  10 mg Q DAY    aspirin  81 mg DAILY    atorvastatin  40 mg Q EVENING    clopidogrel  75 mg DAILY    [Held by provider] metoprolol tartrate  100 mg BID    montelukast  10 mg Nightly    Pharmacy Consult Request  1 Each PHARMACY TO DOSE    senna-docusate  2 Tablet BID    omeprazole  20 mg DAILY    enoxaparin (LOVENOX) injection  40 mg DAILY AT 1800     PRN medications: " ziprasidone, OLANZapine, acetaminophen, Respiratory Therapy Consult, hydrALAZINE, senna-docusate **AND** polyethylene glycol/lytes, ondansetron **OR** ondansetron, sodium chloride    Diet:  Current Diet Order   Procedures    Diet Order Diet: Regular       Medical Decision Making and Plan:  CVA:  - Patient with L MCA stroke on 1/6. Etiology likely Unclear . Received NO intervention  - Continue ASA and statin for secondary stroke prophylaxis.  -Zio patch     Severe Acute metabolic encephalopathy  Verbally and physically agitated towards staff without provokation  -2/16 -Increased Zyprexa to 5mg BID. Hoping to give a dose at night but may need to use GEODON  -Geodon 5mg PRN, 10mg Geodon caused patient to sleep for 3-4hrs. Decreased to 5mg.  -Continue to empirically treat UTI with bactrim. IM Rocephin given on 2/16 as she refused all medications   - 2/18 patient has not required IM geodon since  10Am yesterday, patient did take two doses of abx yesterday.   -2/19- Patient required Geodon at 11am.   -2/20 IM 10mg Geodon at 6am as patient would not participate. After pleasant, cooperative. Will trial out of posey bed. Patient claustrophobic.  I certify that the patient currently requires non-pharmacologic restraints. Non-pharmacologic restraints are required to reduce the potential for the patient to harm themselves or others, to limit violent behaviors, and to allow proper assessment and care. I have completed a face to face assessment of this patient on 2/20/2024. Alternative methods including but not limited to de-escalation techniques, redirection, and pharmacologic treatment have been attempted but patient currently remains at risk without restraints. Our staff has been trained on restraints and patient is currently placed in video monitored room and/or close to the nursing station.  The need for these restraints is assessed by a rehabilitation physician every 24 hours and use of restraints is minimized when  appropriate.  Current diagnosis which requires restraints: Delerium. Current restraints required: Posey.       Elevated lactic acid  - Lactic acid 2.8  - likely due to UTI, Hospitalist consulted   - patient did take two doses of oral bactrim on 2/17      UTI  - UA + for UTI on 2/16  - s/p one dose IM rocephin 100mg on 2/16  - will plan to repeat dose for treatment (patient unwilling to cooperative with PO meds or IV)   - 2/18 goal is get patient to take another dose of PO bactrim, if not, may need repeat IM rocephin   - 2/19- UA from 2/16 grossly positive. Will send to culture  -Restart Abx. 2/19 start Rocephin 1g daily IM for 5 days. Cultures and sensitivities back  -2/20 continue Rocephin     Factor V leiden  -has not been on anticoagulation for 10 years  -bilateral  US to rule out blood clots negative on 2/11     Vertigo  -PT to test BPPV  -Meclizine 12.5mg BID  -holding meclizine     CAD  -Pavix     COPD  -on 3l at home, currently on 3.5L  -Montelucast     HTN  -Norvasc 10mg daily  Losartan 100mg daily  -2/14- decrease Losartan to 50mg daily due to hypotension and poor po intake  Lopressor 100mg BID  -2/15- refusing BP medication  -2/16- refusing all vitals and bp meds  -continue norvasc, losartan and lopressor  -2/18 intermittmently elevated,hospitalist following   -2/20 IM signed off.  -2/20- Hold all BP meds     Lumbar back pain  Flexeril and gabapentin  -2/14- DC flexeril and gabapentin due to delerium  -2/15- restarted flexeril 10mg TID PRN as patient triggered by not getting her muscle relaxor  -Holding all pain medications         Neurogenic bladder:  - Timed voids with PVR q4H x3. If PVR > 400mL or if patient is unable to void, straight cath patient.  - 2/17 incontinent      Neurogenic bowel:  -  Colace, Senna BID on admission  - Goal of 1BM/day.  - No documented BM since 2/13    - will neeed to encourage stool softners if able      Mood  Anxiety  -Paxil  -2/14-Wean off Paxil due to delerium. 20mg  daily  -Now on Paxil 20mg daily        Pain:  - Neuroceptic - continue tylenol     DVT prophylaxis: continue lovenox     GI prophylaxis:  On Prilosec 20mg daily, 2/15 DC pepcid to minimize drug burden       ____________________________________    Carlin Powers MD  Physical Medicine & Rehabilitation   Brain Injury Medicine   ____________________________________    Total time:  60 minutes. Time spent included pre-rounding, review of vitals and tests, unit/floor time, face-to-face time with the patient including physical examination, care coordination, counseling of patient and/or family, ordering medications/procedures/tests, discussion with CM, PT, OT, SLP and/or other healthcare providers, and documentation in the electronic medical record. Topics discussed included dispostion.

## 2024-02-20 NOTE — DISCHARGE PLANNING
Case Management/IDT follow up.   IDT continues to recommend IRF level of care as patient continue to make progress with all therapies.   Projected dc date set for 2/23/24      DC needs:  Recommendations made for home health for PT/OT/SLP  Follow up with: PCP neuro    Spoke with  patient's daughter to provide update from IDT and discuss plan of care.She would like for the patient to transfer to the senior care on 2/26/24 so that they come and assess her on Friday.    Plan:  Continue to follow

## 2024-02-20 NOTE — CARE PLAN
"The patient is Watcher - Medium risk of patient condition declining or worsening    Shift Goals  Clinical Goals: safety    Problem: Pain - Standard  Goal: Alleviation of pain or a reduction in pain to the patient’s comfort goal  Outcome: Progressing     Problem: Fall Risk - Rehab  Goal: Patient will remain free from falls  Note: Tiffani Hinton Fall risk Assessment Score: 26    High fall risk Interventions   - Bed and strip alarm   - Yellow sign by the door   - Yellow wrist band \"Fall risk\"  - Room near to the nurse station  - Do not leave patient unattended in the bathroom  - Fall risk education provided  Patient has order for [facility] bed     "

## 2024-02-20 NOTE — PROGRESS NOTES
NURSING DAILY NOTE    Name: Lorraine Leung   Date of Admission: 2/9/2024   Admitting Diagnosis: Acute ischemic left MCA stroke (HCC)  Attending Physician: Carlin Powers M.d.  Allergies: Patient has no known allergies.    Safety  Patient Assist  min  Patient Precautions  Fall Risk  Precaution Comments  [place], pt wears headphones amplifier, O2=3.5L no wean  Bed Transfer Status  Minimal Assist  Toilet Transfer Status   Contact Guard Assist  Assistive Devices  Rails, Wheelchair  Oxygen  Silicone Nasal Cannula  Diet/Therapeutic Dining  Current Diet Order   Procedures    Diet Order Diet: Regular     Pill Administration  whole  Agitated Behavioral Scale  37  ABS Level of Severity  Severe Agitation    Fall Risk  Has the patient had a fall this admission?   No  Tiffani Hinton Fall Risk Scoring  27, HIGH RISK  Fall Risk Safety Measures  bed alarm, chair alarm, seatbelt alarm, and posey bed     Vitals  Temperature: 37.1 °C (98.7 °F)  Temp src: Axillary  Pulse: 84  Respiration: 18  Blood Pressure : (!) 146/106  Blood Pressure MAP (Calculated): 119 MM HG  BP Location: Left, Upper Arm  Patient BP Position: Supine     Oxygen  Pulse Oximetry: 93 %  O2 (LPM): 2  O2 Delivery Device: Silicone Nasal Cannula    Bowel and Bladder  Last Bowel Movement  02/16/24 (per daughter)  Stool Type  Type 4: Like a sausage or snake, smooth and soft  Bowel Device  Diaper, Bathroom  Continent  Bladder: Did not void   Bowel: No movement  Bladder Function  Urine Void (mL):  (after taking vitals, offered to take pt to bathroom, pt. replied with a sarcastic lara NO!, asked if there was anything i could get for her...another lara NO!)  Number of Times Voided: 1  Urinary Options: Yes  Urine Color: Yellow  Urine Clarity: Cloudy (Unable to see Through)  Urine Odor: Malodorous  Number of Times Incontinent of Urine: 1  Straight Catheter: 425 ml  Wet Diaper Count: 1  Genitourinary Assessment   Bladder  Assessment (WDL):  WDL Except  [place] Catheter: Not Applicable  Urinary Elimination: Incontinence  Urine Color: Yellow  Urine Clarity: Cloudy (Unable to see Through)  Urine Odor: Malodorous  Number of Bladder Accidents: 1  Total Number of Bladder of Accidents in Last 7 Days: 1  Number of Times Incontinent of Urine: 1  Bladder Device: Bathroom, Diaper  Time Void: Yes  Bladder Scan: Post Void  $ Bladder Scan Results (mL): 70    Skin  Selwyn Score   15  Sensory Interventions   Bed Types:  (posey)  Skin Preventative Measures: Pillows in Use for Support / Positioning  Moisture Interventions  Moisturizers/Barriers: Barrier Wipes      Pain  Pain Rating Scale  3 - Sometimes distracts me  Pain Location  Head, Neck  Pain Location Orientation  Posterior  Pain Interventions   Declines    ADLs    Bathing   Shower, * * With Assistance from, Staff  Linen Change   Complete  Personal Hygiene  Perineal Care  Chlorhexidine Bath      Oral Care  Brushed Teeth  Teeth/Dentures     Shave     Nutrition Percentage Eaten   (Refused lunch.)  Environmental Precautions  Treaded Slipper Socks on Patient, Personal Belongings, Wastebasket, Call Bell etc. in Easy Reach, Transferred to Stronger Side, Report Given to Other Health Care Providers Regarding Fall Risk, Bed in Low Position  Patient Turns/Positioning  Patient Turns Self from Side to Side  Patient Turns Assistance/Tolerance  Assistance of Two or More, General Weakness  Bed Positions  Bed Controls On  Head of Bed Elevated  Self regulated      Psychosocial/Neurologic Assessment  Psychosocial Assessment  Psychosocial (WDL):  WDL Except  Patient Behaviors: Agitated, Combative, Hostile  Family Behaviors: No Family Present  Neurologic Assessment  Neuro (WDL): Exceptions to WDL  Level of Consciousness: Alert  Orientation Level: Oriented to person  Cognition: Confused in conversation, Poor judgement  Speech: Clear  Pupil Assesment: No  R Pupil Size (mm): 3  R Pupil Shape / Description: Round  R Pupil  Reaction: Brisk  L Pupil Size (mm): 3  L Pupil Shape / Description: Round  L Pupil Reaction: Brisk  RUE Motor Response: Responds to commands  RUE Sensation: Numbness, Tingling  Muscle Strength Right Arm: Good Strength Against Gravity and Moderate Resistance  RLE Motor Response: Responds to commands  RLE Sensation: Numbness, Tingling  Muscle Strength Right Leg: Good Strength Against Gravity and Moderate Resistance  EENT (WDL):  WDL Except    Cardio/Pulmonary Assessment  Edema      Respiratory Breath Sounds  RUL Breath Sounds: Clear  RML Breath Sounds: Clear  RLL Breath Sounds: Diminished  KASEY Breath Sounds: Clear  LLL Breath Sounds: Diminished  Cardiac Assessment   Cardiac (WDL):  WDL Except (hx htn, cad)

## 2024-02-20 NOTE — THERAPY
Speech Language Pathology  Daily Treatment     Patient Name: Lorraine Leung  Age:  78 y.o., Sex:  female  Medical Record #: [id number]  Today's Date: 2024     Precautions  Precautions: Fall Risk  Comments: [place], pt wears headphones amplifier, O2=3.5L no wean    Subjective    Assumed care of patient from PT and PT student. Pt pleasant and appeared in good spirits, agreeable to participate in ST office.     Objective       24 0934   Treatment Charges   SLP Cognitive Skill Development First 15 Minutes 1   SLP Cognitive Skill Development Additional 15 Minutes 3   SLP Total Time Spent   SLP Individual Total Time Spent (Mins) 60   Cognition   Attention to Task Minimal (4)   Simple Attention Minimal (4)   Orientation  Minimal (4)   Interdisciplinary Plan of Care Collaboration   IDT Collaboration with  Physical Therapist   Patient Position at End of Therapy Seated;Chair Alarm On;Self Releasing Lap Belt Applied   Collaboration Comments assumed care from PT and PT student. Pt at nurses station at end of session.         Assessment    Simple sustained attention tasks x3 trials using Blink cards. Pt sorted the following:  By color: 98%  By number: 86% - pt frequently confusing 4 and 5 requiring cues to look closely at each card before sorting.  By shape: 98%    Orientation lo/30 - pt not oriented to date or deficits.    Strengths: Supportive family  Barriers: Agitation, Confused, Decreased endurance, Difficulty following instructions, Bladder incontinence, Impaired activity tolerance, Impaired carryover of learning, Impaired insight/denial of deficits, Impaired functional cognition, Impulsive, Uncooperative    Plan    Continue to target orientation, attention    Passport items to be completed:  Express basic needs, understand food/liquid recommendations, consistently follow swallow precautions, manage finances, manage medications, arrive to therapy appointments on time, complete daily memory log entries, solve  problems related to safety situations, review education related to hospitalization, complete caregiver training     Speech Therapy Problems (Active)       Problem: Memory STGs       Dates: Start:  02/10/24         Goal: STG-Within one week, patient will       Dates: Start:  02/10/24       Description: 1) Individualized goal:  recall novel information related to rehabilitation using external and internal memory strategies with 80% acc and MIN A.   2) Interventions:  SLP Self Care / ADL Training , SLP Cognitive Skill Development, and SLP Group Treatment          Goal Note filed on 02/20/24 0808 by Tracy Calix MS,[facility]       Delirium, agitation - pt unable to participate in structured therapy.                 Problem: Problem Solving STGs       Dates: Start:  02/13/24         Goal: STG-Within one week, patient will complete medication management tasks with min A to achieve 80% accuracy.         Dates: Start:  02/13/24         Goal Note filed on 02/20/24 0808 by [facility]ix [facility]       Delirium, agitation - pt unable to participate in structured therapy.              Goal: STG-Within one week, patient will complete financial management tasks with min A to achieve 80% accuracy.         Dates: Start:  02/13/24         Goal Note filed on 02/20/24 0808 by Tracy Calix MS,[facility]       Delirium, agitation - pt unable to participate in structured therapy.                 Problem: Speech/Swallowing LTGs       Dates: Start:  02/10/24         Goal: LTG-By discharge, patient will safely swallow       Dates: Start:  02/10/24       Description: 1) Individualized goal:  level 7 regular textures and thin liquids with no overt clinical s/sx of aspiration for a safe D/C to PLOF.  2) Interventions:  SLP Swallowing Dysfunction Treatment, SLP Self Care / ADL Training , and SLP Group Treatment             Goal: LTG-By discharge, patient will solve complex problem       Dates: Start:  02/10/24       Description: 1)  Individualized goal:  related to health/safety with 80% acc and MOD I for a safe D/C to PLOF.   2) Interventions:  SLP Self Care / ADL Training , SLP Cognitive Skill Development, and SLP Group Treatment                Problem: Swallowing STGs       Dates: Start:  02/10/24         Goal: STG-Within one week, patient will safely swallow       Dates: Start:  02/10/24       Description: 1) Individualized goal:  trials of level 7 regular textures and thin liquids with no overt clinical s/sx of aspiration for 2/2 meals w/ SLP.  2) Interventions:  SLP Swallowing Dysfunction Treatment, SLP Self Care / ADL Training , and SLP Group Treatment          Goal Note filed on 02/20/24 0808 by Tracy Calix MS,[facility]-SLP       Pt refusing participation in dysphagia intervention.

## 2024-02-20 NOTE — CARE PLAN
"  Problem: Knowledge Deficit - Standard  Goal: Patient and family/care givers will demonstrate understanding of plan of care, disease process/condition, diagnostic tests and medications  Outcome: Not Met  Note: Pt agrees with plan of care tonight regarding medications and safety.  Will continue to monitor patient.     Problem: Fall Risk - Rehab  Goal: Patient will remain free from falls  Outcome: Not Met  Note: Tiffani Hinton Fall risk Assessment Score: 27    High fall risk Interventions   - Alarming seatbelt  - Wander guard  - Bed and strip alarm   - Yellow sign by the door   - Yellow wrist band \"Fall risk\"  - Room near to the nurse station  - Do not leave patient unattended in the bathroom  - Fall risk education provided       The patient is Stable - Low risk of patient condition declining or worsening    Shift Goals  Clinical Goals: Safety  Patient Goals: Sleep well      Patient is not progressing towards the following goals:      Problem: Knowledge Deficit - Standard  Goal: Patient and family/care givers will demonstrate understanding of plan of care, disease process/condition, diagnostic tests and medications  Outcome: Not Met  Note: Pt agrees with plan of care tonight regarding medications and safety.  Will continue to monitor patient.     Problem: Fall Risk - Rehab  Goal: Patient will remain free from falls  Outcome: Not Met  Note: Tiffani Hinton Fall risk Assessment Score: 27    High fall risk Interventions   - Alarming seatbelt  - Wander guard  - Bed and strip alarm   - Yellow sign by the door   - Yellow wrist band \"Fall risk\"  - Room near to the nurse station  - Do not leave patient unattended in the bathroom  - Fall risk education provided          "

## 2024-02-20 NOTE — THERAPY
Missed Therapy    Patient Name: Lorraine Leung  Age:  78 y.o., Sex:  female  Medical Record #: [id number]  Today's Date: 2/20/2024 02/20/24 1401   Therapy Missed   Missed Therapy (Minutes) 30   Reason For Missed Therapy Medical - Patient on Hold from Therapy;Medical - Patient  in Procedure  (pt getting labs done in order to d/c to long-term)   Interdisciplinary Plan of Care Collaboration   Patient Position at End of Therapy Seated;Chair Alarm On;Self Releasing Lap Belt Applied;Call Light within Reach;Tray Table within Reach;Phone within Reach;Other (Comments)  (left with nursing and lab techs)       Discussed missed therapy with nursing and Dr. Powers.

## 2024-02-20 NOTE — PROGRESS NOTES
Able to give Geodon IM this morning.  Needed 4 staff members to help with linen change, incontinent of bladder.  Resistant to ADL care, verbally abusive to staff and combative.  Remains in S[facility] bed.  Will continue to monitor patient.

## 2024-02-20 NOTE — PROGRESS NOTES
MD aware that ziopatch might have been removed by patient or accidentally fell off. No orders to replace at this time.

## 2024-02-20 NOTE — PROGRESS NOTES
NURSING DAILY NOTE    Name: Lorraine Leung   Date of Admission: 2/9/2024   Admitting Diagnosis: Acute ischemic left MCA stroke (HCC)  Attending Physician: Carlin Powers M.d.  Allergies: Patient has no known allergies.    Safety  Patient Assist  Min Assist  Patient Precautions  Fall Risk  Precaution Comments  [place], pt wears headphones amplifier, O2=3.5L no wean  Bed Transfer Status  Minimal Assist  Toilet Transfer Status   Contact Guard Assist  Assistive Devices  Wheelchair, Rails  Oxygen  Silicone Nasal Cannula  Diet/Therapeutic Dining  Current Diet Order   Procedures    Diet Order Diet: Regular     Pill Administration  whole  Agitated Behavioral Scale  43  ABS Level of Severity  Severe Agitation    Fall Risk  Has the patient had a fall this admission?   No  Tiffani Hinton Fall Risk Scoring  27, HIGH RISK  Fall Risk Safety Measures  posey bed     Vitals  Temperature: 37.1 °C (98.7 °F)  Temp src: Axillary  Pulse: 84  Respiration: 18  Blood Pressure : (!) 146/106  Blood Pressure MAP (Calculated): 119 MM HG  BP Location: Left, Upper Arm  Patient BP Position: Supine     Oxygen  Pulse Oximetry: 93 %  O2 (LPM): 2  O2 Delivery Device: Silicone Nasal Cannula    Bowel and Bladder  Last Bowel Movement  02/16/24 (per daughter)  Stool Type  Type 4: Like a sausage or snake, smooth and soft  Bowel Device  Bathroom, Other (Comment) (Bowel Meds)  Continent  Bladder: Did not void   Bowel: No movement  Bladder Function  Urine Void (mL):  (after taking vitals, offered to take pt to bathroom, pt. replied with a sarcastic lara NO!, asked if there was anything i could get for her...another lara NO!)  Number of Times Voided: 1  Urinary Options: Yes  Urine Color: Yellow  Urine Clarity: Cloudy (Unable to see Through)  Urine Odor: Malodorous  Number of Times Incontinent of Urine: 1  Straight Catheter: 425 ml  Wet Diaper Count: 1  Genitourinary Assessment   Bladder Assessment (WDL):   WDL Except  [place] Catheter: Not Applicable  Urinary Elimination: Incontinence  Urine Color: Yellow  Urine Clarity: Cloudy (Unable to see Through)  Urine Odor: Malodorous  Number of Bladder Accidents: 1  Total Number of Bladder of Accidents in Last 7 Days: 1  Number of Times Incontinent of Urine: 1  Bladder Device: Diaper  Time Void: Yes  Bladder Scan: Post Void  $ Bladder Scan Results (mL): 70    Skin  Selwyn Score   15  Sensory Interventions   Bed Types: Other (Comments) ([facility])  Skin Preventative Measures: Pillows in Use for Support / Positioning  Moisture Interventions  Moisturizers/Barriers: Barrier Wipes      Pain  Pain Rating Scale  3 - Sometimes distracts me  Pain Location  Head, Neck  Pain Location Orientation  Posterior  Pain Interventions   Declines    ADLs    Bathing   Shower, * * With Assistance from, Staff  Linen Change   Complete  Personal Hygiene  Perineal Care  Chlorhexidine Bath      Oral Care  Brushed Teeth  Teeth/Dentures     Shave     Nutrition Percentage Eaten   (Refused lunch.)  Environmental Precautions  Treaded Slipper Socks on Patient, Bed in Low Position  Patient Turns/Positioning  Patient Turns Self from Side to Side  Patient Turns Assistance/Tolerance  Assistance of Two or More, General Weakness  Bed Positions  Bed Controls On, Bed Locked  Head of Bed Elevated  Self regulated      Psychosocial/Neurologic Assessment  Psychosocial Assessment  Psychosocial (WDL):  WDL Except  Patient Behaviors: Anxious, Confused  Family Behaviors: No Family Present  Neurologic Assessment  Neuro (WDL): Exceptions to WDL  Level of Consciousness: Alert  Orientation Level: Disoriented to time, Disoriented to situation  Cognition: Confused in conversation, Impulsive, Poor safety awareness, Unable to follow commands  Speech: Clear  Pupil Assesment: Yes  R Pupil Size (mm): 3  R Pupil Shape / Description: Round  R Pupil Reaction: Brisk  L Pupil Size (mm): 3  L Pupil Shape / Description: Round  L Pupil Reaction:  Brisk  RUE Motor Response: Responds to commands  RUE Sensation: Numbness, Tingling  Muscle Strength Right Arm: Good Strength Against Gravity and Moderate Resistance  RLE Motor Response: Responds to commands  RLE Sensation: Numbness, Tingling  Muscle Strength Right Leg: Good Strength Against Gravity and Moderate Resistance  EENT (WDL):  WDL Except    Cardio/Pulmonary Assessment  Edema      Respiratory Breath Sounds  RUL Breath Sounds: Clear, Diminished  RML Breath Sounds: Clear, Diminished  RLL Breath Sounds: Clear, Diminished  KASEY Breath Sounds: Clear, Diminished  LLL Breath Sounds: Clear, Diminished  Cardiac Assessment   Cardiac (WDL):  WDL Except (H/O HTN, CAD)

## 2024-02-20 NOTE — PROGRESS NOTES
Hospital Medicine Daily Progress Note      Chief Complaint  Altered Mental Status  Hypertension    Interval Problem Update  Note (02/19):  D/W Physiatrist -- Physiatrist will manage patient.                          Will sign off.                          Re-consult if needed.    Review of Systems  Review of Systems   Unable to perform ROS: Mental status change        Physical Exam  Pulse:  [] 105  Resp:  [16-20] 20  BP: (128)/(86) 128/86  SpO2:  [91 %-97 %] 91 %    Physical Exam  Vitals and nursing note reviewed.   Constitutional:       Appearance: Normal appearance.   HENT:      Head: Atraumatic.   Eyes:      Conjunctiva/sclera: Conjunctivae normal.      Pupils: Pupils are equal, round, and reactive to light.   Cardiovascular:      Rate and Rhythm: Normal rate and regular rhythm.      Heart sounds: No murmur heard.  Pulmonary:      Effort: Pulmonary effort is normal.      Breath sounds: No stridor. No wheezing or rales.   Abdominal:      General: There is no distension.      Palpations: Abdomen is soft.      Tenderness: There is no abdominal tenderness.   Musculoskeletal:      Cervical back: Normal range of motion and neck supple.      Right lower leg: No edema.      Left lower leg: No edema.   Skin:     General: Skin is warm and dry.      Findings: No rash.   Neurological:      Mental Status: She is alert and oriented to person, place, and time.   Psychiatric:         Mood and Affect: Mood normal.         Behavior: Behavior normal.         Fluids    Intake/Output Summary (Last 24 hours) at 2/20/2024 0958  Last data filed at 2/19/2024 2011  Gross per 24 hour   Intake 120 ml   Output --   Net 120 ml       Laboratory  Recent Labs     02/17/24  1205   WBC 7.6   RBC 4.04*   HEMOGLOBIN 12.1   HEMATOCRIT 37.1   MCV 91.8   MCH 30.0   MCHC 32.6   RDW 40.4   PLATELETCT 321   MPV 9.5     Recent Labs     02/17/24  1205   SODIUM 138   POTASSIUM 3.1*   CHLORIDE 100   CO2 20   GLUCOSE 157*   BUN 17   CREATININE 0.81    CALCIUM 8.9                 Assessment/Plan  * Acute ischemic left MCA stroke (HCC)- (present on admission)  Assessment & Plan  Cont ASA, Plavix, and Lipitor    Hypertension  Assessment & Plan  BP ok  Norvasc, Cozaar, and Lopressor on hold (pt has been refusing)  Cont to monitor    Hypokalemia  Assessment & Plan  K+: 3.1 (2/17)  Likely 2nd to diminished appetite and oral intake  S/P KCL 40 meq x 2 doses on 2/17  Note: Pt has been refusing labs  Cont to monitor    AMS (altered mental status)  Assessment & Plan  PCT <0.05  NH4: 16  Urine cultures show Citrobacter  BC x 2: neg  CXR: showed left atx or pneumonia  CT head: negative for acute  Cont Rocephin (thru 2/23)  Note: Zyprexa, Trazodone, and prn Geodon (per Physiatry)    CAD (coronary artery disease)  Assessment & Plan  S/P NSTEMI  Echo (1/7/24): EF 45%, grade III DD  Cont ASA, Plavix, Lipitor  Cont Cozaar and Lopressor    COPD (chronic obstructive pulmonary disease) ([facility])  Assessment & Plan  Reportedly on chronic supplemental O2 at 3 lpm via NC  Currently on 2 liters O2  Cont Dulera, Spiriva, and Singulair    Factor V Leiden ([facility])  Assessment & Plan  Not on chronic anticoagulation  U/S BLE (2/11): negative for DVT    Anxiety  Assessment & Plan  Cont Paxil

## 2024-02-20 NOTE — CARE PLAN
Problem: Swallowing STGs  Goal: STG-Within one week, patient will safely swallow  Description: 1) Individualized goal:  trials of level 7 regular textures and thin liquids with no overt clinical s/sx of aspiration for 2/2 meals w/ SLP.  2) Interventions:  SLP Swallowing Dysfunction Treatment, SLP Self Care / ADL Training , and SLP Group Treatment      Outcome: Not Met  Note: Pt refusing participation in dysphagia intervention.     Problem: Memory STGs  Goal: STG-Within one week, patient will  Description: 1) Individualized goal:  recall novel information related to rehabilitation using external and internal memory strategies with 80% acc and MIN A.   2) Interventions:  SLP Self Care / ADL Training , SLP Cognitive Skill Development, and SLP Group Treatment      Outcome: Not Met  Note: Delirium, agitation - pt unable to participate in structured therapy.     Problem: Problem Solving STGs  Goal: STG-Within one week, patient will complete medication management tasks with min A to achieve 80% accuracy.    Outcome: Not Met  Note: Delirium, agitation - pt unable to participate in structured therapy.  Goal: STG-Within one week, patient will complete financial management tasks with min A to achieve 80% accuracy.    Outcome: Not Met  Note: Delirium, agitation - pt unable to participate in structured therapy.     Problem: Memory STGs  Goal: STG-Within one week, patient will  Description: 1) Individualized goal:  recall novel information related to rehabilitation using external and internal memory strategies with 80% acc and MIN A.   2) Interventions:  SLP Self Care / ADL Training , SLP Cognitive Skill Development, and SLP Group Treatment      Outcome: Not Met  Note: Delirium, agitation - pt unable to participate in structured therapy.

## 2024-02-21 ENCOUNTER — APPOINTMENT (OUTPATIENT)
Dept: SPEECH THERAPY | Facility: REHABILITATION | Age: 79
DRG: 056 | End: 2024-02-21
Attending: PHYSICAL MEDICINE & REHABILITATION
Payer: MEDICARE

## 2024-02-21 ENCOUNTER — APPOINTMENT (OUTPATIENT)
Dept: PHYSICAL THERAPY | Facility: REHABILITATION | Age: 79
DRG: 056 | End: 2024-02-21
Attending: PHYSICAL MEDICINE & REHABILITATION
Payer: MEDICARE

## 2024-02-21 ENCOUNTER — APPOINTMENT (OUTPATIENT)
Dept: OCCUPATIONAL THERAPY | Facility: REHABILITATION | Age: 79
DRG: 056 | End: 2024-02-21
Attending: PHYSICAL MEDICINE & REHABILITATION
Payer: MEDICARE

## 2024-02-21 ENCOUNTER — APPOINTMENT (OUTPATIENT)
Dept: INPATIENT REHAB | Facility: REHABILITATION | Age: 79
DRG: 056 | End: 2024-02-21
Payer: MEDICARE

## 2024-02-21 LAB
GAMMA INTERFERON BACKGROUND BLD IA-ACNC: 0.04 IU/ML
M TB IFN-G BLD-IMP: NEGATIVE
M TB IFN-G CD4+ BCKGRND COR BLD-ACNC: 0 IU/ML
MITOGEN IGNF BCKGRD COR BLD-ACNC: >10 IU/ML
QFT TB2 - NIL TBQ2: -0.01 IU/ML

## 2024-02-21 PROCEDURE — 97116 GAIT TRAINING THERAPY: CPT

## 2024-02-21 PROCEDURE — 94760 N-INVAS EAR/PLS OXIMETRY 1: CPT

## 2024-02-21 PROCEDURE — 97112 NEUROMUSCULAR REEDUCATION: CPT

## 2024-02-21 PROCEDURE — 700111 HCHG RX REV CODE 636 W/ 250 OVERRIDE (IP): Mod: JZ | Performed by: PHYSICAL MEDICINE & REHABILITATION

## 2024-02-21 PROCEDURE — 92526 ORAL FUNCTION THERAPY: CPT

## 2024-02-21 PROCEDURE — A9270 NON-COVERED ITEM OR SERVICE: HCPCS | Performed by: PHYSICAL MEDICINE & REHABILITATION

## 2024-02-21 PROCEDURE — 97130 THER IVNTJ EA ADDL 15 MIN: CPT

## 2024-02-21 PROCEDURE — 97129 THER IVNTJ 1ST 15 MIN: CPT

## 2024-02-21 PROCEDURE — 770010 HCHG ROOM/CARE - REHAB SEMI PRIVAT*

## 2024-02-21 PROCEDURE — 94640 AIRWAY INHALATION TREATMENT: CPT

## 2024-02-21 PROCEDURE — 97535 SELF CARE MNGMENT TRAINING: CPT

## 2024-02-21 PROCEDURE — 97530 THERAPEUTIC ACTIVITIES: CPT

## 2024-02-21 PROCEDURE — 97602 WOUND(S) CARE NON-SELECTIVE: CPT

## 2024-02-21 PROCEDURE — 700102 HCHG RX REV CODE 250 W/ 637 OVERRIDE(OP): Performed by: PHYSICAL MEDICINE & REHABILITATION

## 2024-02-21 RX ORDER — FAMOTIDINE 20 MG/1
20 TABLET, FILM COATED ORAL 2 TIMES DAILY
Status: DISCONTINUED | OUTPATIENT
Start: 2024-02-21 | End: 2024-02-23 | Stop reason: HOSPADM

## 2024-02-21 RX ORDER — CEFDINIR 300 MG/1
300 CAPSULE ORAL EVERY 12 HOURS
Status: DISCONTINUED | OUTPATIENT
Start: 2024-02-22 | End: 2024-02-23 | Stop reason: HOSPADM

## 2024-02-21 RX ADMIN — CLOPIDOGREL BISULFATE 75 MG: 75 TABLET ORAL at 08:24

## 2024-02-21 RX ADMIN — FAMOTIDINE 20 MG: 20 TABLET ORAL at 20:34

## 2024-02-21 RX ADMIN — MOMETASONE FUROATE AND FORMOTEROL FUMARATE DIHYDRATE 2 PUFF: 100; 5 AEROSOL RESPIRATORY (INHALATION) at 06:40

## 2024-02-21 RX ADMIN — CEFTRIAXONE SODIUM 1 G: 1 INJECTION, POWDER, FOR SOLUTION INTRAMUSCULAR; INTRAVENOUS at 08:28

## 2024-02-21 RX ADMIN — ACETAMINOPHEN 650 MG: 325 TABLET ORAL at 13:07

## 2024-02-21 RX ADMIN — OLANZAPINE 5 MG: 2.5 TABLET, FILM COATED ORAL at 05:46

## 2024-02-21 RX ADMIN — Medication 3 MG: at 17:36

## 2024-02-21 RX ADMIN — MOMETASONE FUROATE AND FORMOTEROL FUMARATE DIHYDRATE 2 PUFF: 100; 5 AEROSOL RESPIRATORY (INHALATION) at 20:28

## 2024-02-21 RX ADMIN — LIDOCAINE HYDROCHLORIDE 2.1 ML: 10 INJECTION, SOLUTION EPIDURAL; INFILTRATION; INTRACAUDAL; PERINEURAL at 08:27

## 2024-02-21 RX ADMIN — TIOTROPIUM BROMIDE INHALATION SPRAY 5 MCG: 3.12 SPRAY, METERED RESPIRATORY (INHALATION) at 06:41

## 2024-02-21 RX ADMIN — ENOXAPARIN SODIUM 40 MG: 100 INJECTION SUBCUTANEOUS at 17:35

## 2024-02-21 RX ADMIN — OLANZAPINE 5 MG: 2.5 TABLET, FILM COATED ORAL at 17:36

## 2024-02-21 RX ADMIN — OMEPRAZOLE 20 MG: 20 CAPSULE, DELAYED RELEASE ORAL at 08:24

## 2024-02-21 RX ADMIN — ATORVASTATIN CALCIUM 40 MG: 40 TABLET, FILM COATED ORAL at 20:29

## 2024-02-21 RX ADMIN — TRAZODONE HYDROCHLORIDE 50 MG: 50 TABLET ORAL at 20:29

## 2024-02-21 RX ADMIN — FAMOTIDINE 20 MG: 20 TABLET ORAL at 11:34

## 2024-02-21 RX ADMIN — ASPIRIN 81 MG 81 MG: 81 TABLET ORAL at 08:24

## 2024-02-21 RX ADMIN — Medication 100 MG: at 08:24

## 2024-02-21 RX ADMIN — ESCITALOPRAM OXALATE 10 MG: 10 TABLET ORAL at 08:25

## 2024-02-21 RX ADMIN — MONTELUKAST 10 MG: 10 TABLET, FILM COATED ORAL at 20:28

## 2024-02-21 ASSESSMENT — GAIT ASSESSMENTS
GAIT LEVEL OF ASSIST: CONTACT GUARD ASSIST
ASSISTIVE DEVICE: FRONT WHEEL WALKER
ASSISTIVE DEVICE: FRONT WHEEL WALKER
DEVIATION: ATAXIC;BRADYKINETIC;DECREASED HEEL STRIKE;DECREASED TOE OFF
DEVIATION: BRADYKINETIC;INCREASED BASE OF SUPPORT;DECREASED HEEL STRIKE;DECREASED TOE OFF
DISTANCE (FEET): 90
DISTANCE (FEET): 100
GAIT LEVEL OF ASSIST: CONTACT GUARD ASSIST

## 2024-02-21 ASSESSMENT — ACTIVITIES OF DAILY LIVING (ADL)
BED_CHAIR_WHEELCHAIR_TRANSFER_DESCRIPTION: ADAPTIVE EQUIPMENT;INCREASED TIME;SET-UP OF EQUIPMENT;VERBAL CUEING
TOILET_TRANSFER_DESCRIPTION: ADAPTIVE EQUIPMENT;GRAB BAR;INCREASED TIME;SET-UP OF EQUIPMENT;VERBAL CUEING

## 2024-02-21 ASSESSMENT — PAIN DESCRIPTION - PAIN TYPE: TYPE: ACUTE PAIN

## 2024-02-21 NOTE — PROGRESS NOTES
Received bedside shift report from Khushbu ESPINOSA RN regarding patient and assumed care. Patient awake, calm and stable, currently positioned in bed for comfort and safety; call light within reach. Denies pain or discomfort at this time. Will continue to monitor.

## 2024-02-21 NOTE — DISCHARGE PLANNING
Nurse from [facility] here to assess the patient. Will continue to assist with the discharge planning.

## 2024-02-21 NOTE — THERAPY
Physical Therapy   Daily Treatment     Patient Name: Lorraine Leung  Age:  78 y.o., Sex:  female  Medical Record #: [id number]  Today's Date: 2/21/2024     Precautions  Precautions: Fall Risk  Comments: [place], pt wears headphones amplifier, O2=3.5L no wean    Subjective    Pt resting in bed, willing to participate.     Objective       02/21/24 1031   PT Charge Group   PT Gait Training (Units) 1   PT Therapeutic Activities (Units) 1   PT Total Time Spent   PT Individual Total Time Spent (Mins) 30   Gait Functional Level of Assist    Gait Level Of Assist Contact Guard Assist  (Wc in tow)   Assistive Device Front Wheel Walker   Distance (Feet) 90   # of Times Distance was Traveled 2   Deviation Ataxic;Bradykinetic;Decreased Heel Strike;Decreased Toe Off  (impaired coordination noted RLE)   Transfer Functional Level of Assist   Bed, Chair, Wheelchair Transfer Contact Guard Assist   Bed Chair Wheelchair Transfer Description Adaptive equipment;Increased time;Set-up of equipment;Verbal cueing   Toilet Transfers Contact Guard Assist   Toilet Transfer Description Adaptive equipment;Grab bar;Increased time;Set-up of equipment;Verbal cueing   Bed Mobility    Supine to Sit Contact Guard Assist   Sit to Stand Contact Guard Assist         Assessment    Pt tolerated gait endurance and transfers with CGA for safety, presents with R inattention/ impaired motor planning/ sequencing and impaired coordination RLE  / impaired balance with standing/ transfers and gait as noted. Pleasant and cooperative throughout therapy.    Strengths: Able to follow instructions, Adequate strength, Alert and oriented, Effective communication skills, Independent prior level of function, Motivated for self care and independence, Manages pain appropriately, Pleasant and cooperative, Supportive family, Willingly participates in therapeutic activities  Barriers: Fatigue, Decreased endurance, Hearing impairment, Hemiplegia, Impaired activity tolerance, Impaired  balance, Pain    Plan    Check O2 saturation with exercise  Education in exercise with pacing  Transfers  Ambulation with FWW  Scanning R with activity    DME  PT DME Recommendations  Assistive Device: Front Wheeled Walker, Single Point Cane    Passport items to be completed:  Get in/out of bed safely, in/out of a vehicle, safely use mobility device, walk or wheel around home/community, navigate up and down stairs, show how to get up/down from the ground, ensure home is accessible, demonstrate HEP, complete caregiver training    Physical Therapy Problems (Active)       Problem: PT-Long Term Goals       Dates: Start:  02/10/24         Goal: LTG-By discharge, patient will tolerate standing for at least 5 minutes SPV with LRAD in order to facilitate standing ADLs       Dates: Start:  02/10/24            Goal: LTG-By discharge, patient will ambulate community distances of 150' SPV with LRAD       Dates: Start:  02/10/24            Goal: LTG-By discharge, patient will transfer one surface to another SPV using LRAD        Dates: Start:  02/10/24            Goal: LTG-By discharge, patient will ambulate up/down 4-6 stairs SPV using bilateral hand rails       Dates: Start:  02/10/24

## 2024-02-21 NOTE — PROGRESS NOTES
"  Physical Medicine & Rehabilitation Progress Note    Encounter Date: 2/21/2024    Chief Complaint: Weakness    Interval Events (Subjective):  Seen in bed. Out of posey bed overnight. No behavioral issues. Blood pressure continues to be controlled off home medications. Taking oral medication. Participating with care.    Objective:  VITAL SIGNS: /66   Pulse 98   Temp 36.8 °C (98.3 °F) (Oral)   Resp 16   Ht 1.676 m (5' 6\")   Wt 63 kg (138 lb 14.2 oz)   SpO2 98%   BMI 22.42 kg/m²   Gen: No acute distress, well developed well nourished adult  HEENT: Normal Cephalic Atraumatic, Normal conjunctiva.   CV: warm extremities, well perfused, no edema  Resp: symmetric chest rise, breathing comfortably on room air  Abd: Soft, Non distended  Extremities: normal bulk, no atrophy  Skin: no visible rashes or lesions.   Neuro: alert, awake  Psych: Mood and affect appropriate and congruent    Laboratory Values:  No results found for this or any previous visit (from the past 72 hour(s)).    Medications:  Scheduled Medications   Medication Dose Frequency    famotidine  20 mg BID    escitalopram  10 mg DAILY    thiamine  100 mg DAILY    OLANZapine  5 mg BID    cefTRIAXone  1 g DAILY    And    lidocaine PF  2.1 mL DAILY    mometasone-formoterol  2 Puff BID (RT)    tiotropium  5 mcg QDAILY (RT)    traZODone  50 mg QHS    [Held by provider] losartan  50 mg Q DAY    melatonin  3 mg AFTER DINNER    [Held by provider] amLODIPine  10 mg Q DAY    aspirin  81 mg DAILY    atorvastatin  40 mg Q EVENING    clopidogrel  75 mg DAILY    [Held by provider] metoprolol tartrate  100 mg BID    montelukast  10 mg Nightly    Pharmacy Consult Request  1 Each PHARMACY TO DOSE    senna-docusate  2 Tablet BID    enoxaparin (LOVENOX) injection  40 mg DAILY AT 1800     PRN medications: ziprasidone, OLANZapine, acetaminophen, Respiratory Therapy Consult, hydrALAZINE, senna-docusate **AND** polyethylene glycol/lytes, ondansetron **OR** ondansetron, " sodium chloride    Diet:  Current Diet Order   Procedures    Diet Order Diet: Regular       Medical Decision Making and Plan:  CVA:  - Patient with L MCA stroke on 1/6. Etiology likely Unclear . Received NO intervention  - Continue ASA, Pavix and statin for secondary stroke prophylaxis.  -Zio patch     Severe Acute metabolic encephalopathy  Verbally and physically agitated towards staff without provokation  -2/16 -Increased Zyprexa to 5mg BID. Hoping to give a dose at night but may need to use GEODON  -Geodon 5mg PRN, 10mg Geodon caused patient to sleep for 3-4hrs. Decreased to 5mg.  -Continue to empirically treat UTI with bactrim. IM Rocephin given on 2/16 as she refused all medications   - 2/18 patient has not required IM geodon since  10Am yesterday, patient did take two doses of abx yesterday.   -2/19- Patient required Geodon at 11am.   -2/20 IM 10mg Geodon at 6am as patient would not participate. After pleasant, cooperative. Will trial out of posey bed. Patient claustrophobic.  -continue Zyprexa 5mg BID     Elevated lactic acid  - Lactic acid 2.8  - likely due to UTI, Hospitalist consulted   - patient did take two doses of oral bactrim on 2/17      UTI  - UA + for UTI on 2/16  - s/p one dose IM rocephin 100mg on 2/16  - will plan to repeat dose for treatment (patient unwilling to cooperative with PO meds or IV)   - 2/18 goal is get patient to take another dose of PO bactrim, if not, may need repeat IM rocephin   - 2/19- UA from 2/16 grossly positive. Will send to culture  -Restart Abx. 2/19 start Rocephin 1g daily IM for 5 days. Cultures and sensitivities back  -2/20 continue Rocephin  -2/21- transition to oral abx Omnicef 300mg BID    Globes sensation with swallowing  -consider GI referall     Factor V leiden  -has not been on anticoagulation for 10 years  -bilateral  US to rule out blood clots negative on 2/11  -Continue ASA, Plavix. Follow up with Heme     Vertigo  -PT to test BPPV  -Meclizine 12.5mg  BID  -holding meclizine     CAD  -Plavix     COPD  -on 3l at home, currently on 3.5L  -Montelucast     HTN  -Norvasc 10mg daily  Losartan 100mg daily  -2/14- decrease Losartan to 50mg daily due to hypotension and poor po intake  Lopressor 100mg BID  -2/15- refusing BP medication  -2/16- refusing all vitals and bp meds  -continue norvasc, losartan and lopressor  -2/18 intermittmently elevated,hospitalist following   -2/20 IM signed off.  -2/20- Hold all BP meds  -2/21- continue to hold bp meds     Lumbar back pain  Flexeril and gabapentin  -2/14- DC flexeril and gabapentin due to delerium  -2/15- restarted flexeril 10mg TID PRN as patient triggered by not getting her muscle relaxor  -Holding all pain medications  -continue to hold all pain medications due to high risk of delerium         Neurogenic bladder:  - Timed voids with PVR q4H x3. If PVR > 400mL or if patient is unable to void, straight cath patient.  - 2/17 incontinent   -2/21- continent     Neurogenic bowel:  -  Colace, Senna BID on admission  - Goal of 1BM/day.  - No documented BM since 2/13    - will neeed to encourage stool softners if able   -2/21 continent     Mood  Anxiety  -Paxil  -2/14-Wean off Paxil due to delerium. 20mg daily  -Now on Paxil 20mg daily  -2/21- start Lexipro 10mg daily. DC Paxil due to increased risk of delerium        Pain:  - Neuroceptic - continue tylenol     DVT prophylaxis: continue lovenox     GI prophylaxis:  2/21- start pepcid for heartburn    ____________________________________    Carlin Powers MD  Physical Medicine & Rehabilitation   Brain Injury Medicine   ____________________________________

## 2024-02-21 NOTE — PROGRESS NOTES
Pt was impulsive once tonight, and that was to get up to use the bathroom.  She had been using the call light tonight for assistance to the bathroom.  Pt was also calm and cooperative when woken up for morning medication.  Alarms in place.

## 2024-02-21 NOTE — WOUND TEAM
[facility]  Inpatient Services  Wound and Skin Care Follow-up    Admission Date: 2024     Last order of IP CONSULT TO WOUND CARE was found on 2024 from Hospital Encounter on 2024     HPI, PMH, SH: Reviewed    History reviewed. No pertinent surgical history.  Social History     Tobacco Use    Smoking status: Former     Current packs/day: 0.00     Types: Cigarettes     Quit date: 1990     Years since quittin.1     Passive exposure: Never    Smokeless tobacco: Not on file   Substance Use Topics    Alcohol use: Never     No chief complaint on file.    Diagnosis: Acute ischemic left MCA stroke (HCC) [I63.512]    Unit where seen by Wound Team:      WOUND FOLLOW UP RELATED TO:  Buttocks MASD       WOUND TEAM PLAN OF CARE - Frequency of Follow-up:   Nursing to follow dressing orders written for wound care. Contact wound team if area fails to progress, deteriorates or with any questions/concerns if something comes up before next scheduled follow up (See below as to whether wound is following and frequency of wound follow up)  Dressing changes by wound team:                   Weekly - Buttocks    WOUND HISTORY:     From wound care notes on  -  Pt arrived with partial thickness and what appeared to be a moisture fissure with some satellite lesions to coccyx and redness to heels. Pt reported incontinence of bowel and bladder.     Recently patient can been combative and impulsive with medical staff. Was told by multiple medical staff members that patient was refusing medications, refused being changed/cleaned posted urination or bowel movements and even refusing to eat, since last week, after last wound care consult.  Patient was disoriented. Today patient was cooperative with this wound care RN and bedside RN Na.         WOUND ASSESSMENT/LDA  Wound 24 Partial Thickness Wound Sacrum Midline Buttocks MASD (Active)   Date First Assessed/Time First Assessed: 24 1600    Present on Original Admission: Yes  Primary Wound Type: Partial Thickness Wound  Location: Sacrum  Wound Orientation: Midline  Wound Description (Comments): Buttocks MASD      Assessments 2/21/2024  1:00 PM   Wound Image     Site Assessment Pink;Red   Periwound Assessment Pink;Excoriated;Blanchable erythema   Closure Open to air   Drainage Amount None   Treatments Cleansed;Site care   Offloading/DME Other (comment)   Wound Cleansing Foam Cleanser/Washcloth   Periwound Protectant Barrier Paste   Dressing Status Open to Air   NEXT Weekly Photo (Inpatient Only) 02/26/24   Wound Team Following Weekly   Shape irregular   Wound Odor None   WOUND NURSE ONLY - Time Spent with Patient (mins) 30        Vascular:    MARRY:   No results found.    Lab Values:    Lab Results   Component Value Date/Time    WBC 7.6 02/17/2024 12:05 PM    RBC 4.04 (L) 02/17/2024 12:05 PM    HEMOGLOBIN 12.1 02/17/2024 12:05 PM    HEMATOCRIT 37.1 02/17/2024 12:05 PM    HBA1C 5.8 (H) 02/10/2024 05:39 AM         Culture Results show:  No results found for this or any previous visit (from the past 720 hour(s)).    Pain Level/Medicated:  Patient denies pain       INTERVENTIONS BY WOUND TEAM:  Chart and images reviewed. Discussed with bedside RN. All areas of concern (based on picture review, LDA review and discussion with bedside RN) have been thoroughly assessed. Documentation of areas based on significant findings. This RN in to assess patient. Performed standard wound care which includes appropriate positioning, dressing removal and non-selective debridement. Pictures and measurements obtained weekly if/when required.    Wound:  Buttocks  Preparation for Dressing removal: Open to air  Cleansed/Non-selectively Debrided with:  Moist warm washcloth  Octavia wound: Cleansed with Moist warm washcloth, Prepped with N/A  Primary Dressing:  Barrier paste    Advanced Wound Care Discharge Planning  Number of Clinicians necessary to complete wound care: 2  Is patient  requiring IV pain medications for dressing changes:  No   Length of time for dressing change 20 min. (This does not include chart review, pre-medication time, set up, clean up or time spent charting.)    Interdisciplinary consultation: Patient, Bedside RN (Na),  Rita .  Pressure injury and staging reviewed with  Rita .    EVALUATION / RATIONALE FOR TREATMENT:     Date:  02/21/24  Wound Status:  No improvement possible decline    Today patient was cooperative with this RN and bedside RN for inspection, assessment and application of barrier paste for skin barrier/protectant. Patient on low airloss bed and is working with therapy and up out of bed and changing positions.           Goals: Steady decrease in wound area and depth weekly.    NURSING PLAN OF CARE ORDERS:  No new orders this visit    NUTRITION RECOMMENDATIONS   Wound Team Recommendations:  N/A     DIET ORDERS (From admission to next 24h)       Start     Ordered    02/15/24 1334  Supplements  ALL MEALS        Question Answer Comment   Which Supplement Ensure    Ensure: Ensure Compact Carton        02/15/24 1333    02/10/24 1514  Diet Order Diet: Regular  ALL MEALS        Question:  Diet:  Answer:  Regular    02/10/24 1514    02/10/24 0812  DIET COMMENT  ALL MEALS        Comments: Please prepare a trial tray of level 6 soft and bite size and thin liquids ASAP for pt's breakfast meal today, 2/10/2024. Thank you!    02/10/24 0812                    PREVENTATIVE INTERVENTIONS:   Q shift [place] - performed per nursing policy  Q shift pressure point assessments - performed per nursing policy    Surface/Positioning  Low Airloss - Currently in Place  Heel Float boots - Currently in place    Containment/Moisture Prevention    Barrier paste - Currently in Place    Mobilization      Working with therapies      Anticipated discharge plans:  TBD         Vac Discharge Needs:  Vac Discharge plan is purely a recommendation from wound team and not a requirement  for discharge unless otherwise stated by physician.  Not Applicable Pt not on a wound vac

## 2024-02-21 NOTE — CARE PLAN
The patient is Watcher - Medium risk of patient condition declining or worsening    Shift Goals  Clinical Goals: Safety  Patient Goals: Rest    Problem: Psychosocial  Goal: Patient's level of anxiety will decrease  Outcome: Progressing    Patient is calm and pleasant.  No s/s of anxiety or distress noted.       Problem: Risk for Aspiration  Goal: Patient's risk for aspiration will be absent or decrease  Outcome: Progressing    Patient is able to swallow pills whole with thin liquids without difficulty.  No s/s of aspiration or choking noted.

## 2024-02-21 NOTE — THERAPY
Speech Language Pathology  Daily Treatment     Patient Name: Lorraine Leung  Age:  78 y.o., Sex:  female  Medical Record #: [id number]  Today's Date: 2/21/2024     Precautions  Precautions: Fall Risk  Comments: [place], pt wears headphones amplifier, O2=3.5L no wean    Subjective    Pt seen at 0830 and 1134 Pt up in w/c, assumed care from nursing in a.m. session. Pt alert, pleasant and agreeable to therapy, appeared in good spirits.     Objective       02/21/24 1134   Treatment Charges   SLP Swallowing Dysfunction Treatment Swallowing Dysfunction Treatment   SLP Cognitive Skill Development First 15 Minutes 1   SLP Cognitive Skill Development Additional 15 Minutes 1   SLP Total Time Spent   SLP Individual Total Time Spent (Mins) 60   Cognition   Attention to Task Supervision (5)   Simple Attention Supervision (5)   Orientation  Within Functional Limits (6-7)   Dysphagia    Positioning / Behavior Modification Multiple Swallows;Modulate Rate or Bite Size;Alternate Solids and Liquids;Other (see Comments)  (extra time between bites)   Diet / Liquid Recommendation Regular - Easy to Chew (7);Thin (0)   Nutritional Liquid Intake Rating Scale Non thickened beverages   Nutritional Food Intake Rating Scale Total oral diet with no restrictions   Interdisciplinary Plan of Care Collaboration   IDT Collaboration with  Nursing   Patient Position at End of Therapy Seated;Self Releasing Lap Belt Applied;Chair Alarm On;Call Light within Reach;Tray Table within Reach   Collaboration Comments informed nursing, pt requesting Tylenol         Assessment    0830: orientation log :29/30 (improvement from day prior score of 23), pt required logical cue for date (stated 20th). Continued alternating attention with simple money math (who has more coins with 3 denominations): Pt require extra time, particularly with calculations on the right. Pt aware of neglect and field cut, benefits from gestural cues for scanning. 63% IND, cues to break  "calculations into smaller units to increase accuracy to 100%    1134: Pt seen during meal with regular textures/thin liquids. Pt reports long standing history of globus sensation, feeling like foods \"stick in my throat\" - breads, meats and pills often result in globus sensation. Pt verbalized strategies including extra time, alternating solids and liquids and taking breaks during meals which she did implement this session IND. Recommend GI referral at d/c to further assess suspected esophageal dysphagia. No further dysphagia intervention warranted at this time.    Strengths: Supportive family  Barriers: Agitation, Confused, Decreased endurance, Difficulty following instructions, Bladder incontinence, Impaired activity tolerance, Impaired carryover of learning, Impaired insight/denial of deficits, Impaired functional cognition, Impulsive, Uncooperative    Plan    D/c dysphagia intervention, continue to target attention, memory, fxl med mngt.    Passport items to be completed:  Express basic needs, understand food/liquid recommendations, consistently follow swallow precautions, manage finances, manage medications, arrive to therapy appointments on time, complete daily memory log entries, solve problems related to safety situations, review education related to hospitalization, complete caregiver training     Speech Therapy Problems (Active)       Problem: Memory STGs       Dates: Start:  02/10/24         Goal: STG-Within one week, patient will       Dates: Start:  02/10/24       Description: 1) Individualized goal:  recall novel information related to rehabilitation using external and internal memory strategies with 80% acc and MIN A.   2) Interventions:  SLP Self Care / ADL Training , SLP Cognitive Skill Development, and SLP Group Treatment          Goal Note filed on 02/20/24 0808 by Tracy Calix MS,CCC-SLP       Delirium, agitation - pt unable to participate in structured therapy.                 Problem: Problem " Solving STGs       Dates: Start:  02/13/24         Goal: STG-Within one week, patient will complete medication management tasks with min A to achieve 80% accuracy.         Dates: Start:  02/13/24         Goal Note filed on 02/20/24 0808 by Tracy Calix MS,CCC-SLP       Delirium, agitation - pt unable to participate in structured therapy.              Goal: STG-Within one week, patient will complete financial management tasks with min A to achieve 80% accuracy.         Dates: Start:  02/13/24         Goal Note filed on 02/20/24 0808 by Tracy Calix MS,CCC-SLP       Delirium, agitation - pt unable to participate in structured therapy.                 Problem: Speech/Swallowing LTGs       Dates: Start:  02/10/24         Goal: LTG-By discharge, patient will safely swallow       Dates: Start:  02/10/24       Description: 1) Individualized goal:  level 7 regular textures and thin liquids with no overt clinical s/sx of aspiration for a safe D/C to PLOF.  2) Interventions:  SLP Swallowing Dysfunction Treatment, SLP Self Care / ADL Training , and SLP Group Treatment             Goal: LTG-By discharge, patient will solve complex problem       Dates: Start:  02/10/24       Description: 1) Individualized goal:  related to health/safety with 80% acc and MOD I for a safe D/C to PLOF.   2) Interventions:  SLP Self Care / ADL Training , SLP Cognitive Skill Development, and SLP Group Treatment                Problem: Swallowing STGs       Dates: Start:  02/10/24         Goal: STG-Within one week, patient will safely swallow       Dates: Start:  02/10/24       Description: 1) Individualized goal:  trials of level 7 regular textures and thin liquids with no overt clinical s/sx of aspiration for 2/2 meals w/ SLP.  2) Interventions:  SLP Swallowing Dysfunction Treatment, SLP Self Care / ADL Training , and SLP Group Treatment          Goal Note filed on 02/20/24 0808 by Tracy Calix MS,CCC-SLP       Pt refusing participation in  dysphagia intervention.

## 2024-02-21 NOTE — PROGRESS NOTES
Pt impulsive.  Attempting to get out of bed without assist, setting off bed alarms.  JOEL Cannon assisted pt to the bathroom and put alarms back in place.  Will continue to monitor patient.

## 2024-02-21 NOTE — PROGRESS NOTES
NURSING DAILY NOTE    Name: Lorraine Leung   Date of Admission: 2/9/2024   Admitting Diagnosis: Acute ischemic left MCA stroke (HCC)  Attending Physician: Carlin Powers M.d.  Allergies: Patient has no known allergies.    Safety  Patient Assist  minimum assist  Patient Precautions  Fall Risk  Precaution Comments  [place], pt wears headphones amplifier, O2=3.5L no wean  Bed Transfer Status  Contact Guard Assist  Toilet Transfer Status   Contact Guard Assist  Assistive Devices  Rails, Wheelchair  Oxygen  Nasal Cannula  Diet/Therapeutic Dining  Current Diet Order   Procedures    Diet Order Diet: Regular     Pill Administration  whole  Agitated Behavioral Scale  16  ABS Level of Severity  No Agitation    Fall Risk  Has the patient had a fall this admission?   No  Tiffani Hinton Fall Risk Scoring  30, HIGH RISK  Fall Risk Safety Measures  Bed strip alarm    Vitals  Temperature: 36.8 °C (98.3 °F)  Temp src: Oral  Pulse: 92  Respiration: 18  Blood Pressure : 106/66  Blood Pressure MAP (Calculated): 79 MM HG  BP Location: Right, Upper Arm  Patient BP Position: Supine     Oxygen  Pulse Oximetry: 96 %  O2 (LPM): 2  O2 Delivery Device: Nasal Cannula    Bowel and Bladder  Last Bowel Movement  02/21/24  Stool Type  Type 5: Soft blob with clear cut edges (passed easily)  Bowel Device  Bathroom  Continent  Bladder: Did not void   Bowel: No movement  Bladder Function  Urine Void (mL):  (after taking vitals, offered to take pt to bathroom, pt. replied with a sarcastic lara NO!, asked if there was anything i could get for her...another lara NO!)  Number of Times Voided: 1  Urinary Options: Yes  Urine Color: Unable To Evaluate  Urine Clarity: Cloudy (Unable to see Through)  Urine Odor: Malodorous  Number of Times Incontinent of Urine: 1  Straight Catheter: 425 ml  Wet Diaper Count: 1  Genitourinary Assessment   Bladder Assessment (WDL):  WDL Except  [place] Catheter: Not  Applicable  Urinary Elimination: Incontinence  Urine Color: Unable To Evaluate  Urine Clarity: Cloudy (Unable to see Through)  Urine Odor: Malodorous  Number of Bladder Accidents: 1  Total Number of Bladder of Accidents in Last 7 Days: 1  Number of Times Incontinent of Urine: 1  Bladder Device: Bathroom  Time Void: Yes  Bladder Scan: Post Void  $ Bladder Scan Results (mL): 70    Skin  Selwyn Score   15  Sensory Interventions   Bed Types: Standard/Trauma Mattress with Overlay, Standard/Trauma Mattress  Skin Preventative Measures: Pillows in Use for Support / Positioning  Moisture Interventions  Moisturizers/Barriers: Barrier Wipes, Barrier Paste      Pain  Pain Rating Scale  0 - No Pain  Pain Location  Head, Neck  Pain Location Orientation  Posterior  Pain Interventions   Declines    ADLs    Bathing   Shower, * * With Assistance from, Staff  Linen Change   Complete  Personal Hygiene  Perineal Care, Moist Octavia Wipes, Change Octavia Pads  Chlorhexidine Bath      Oral Care  Brushed Teeth  Teeth/Dentures     Shave     Nutrition Percentage Eaten  Dinner, Between 25-50% Consumed  Environmental Precautions  Treaded Slipper Socks on Patient, Bed in Low Position  Patient Turns/Positioning  Patient Turns Self from Side to Side  Patient Turns Assistance/Tolerance  Assistance of One, General Weakness  Bed Positions  Bed Controls On, Bed Locked  Head of Bed Elevated  Self regulated      Psychosocial/Neurologic Assessment  Psychosocial Assessment  Psychosocial (WDL):  WDL Except  Patient Behaviors: Confused  Family Behaviors: Family Present  Neurologic Assessment  Neuro (WDL): Exceptions to WDL  Level of Consciousness: Alert  Orientation Level: Disoriented to time, Disoriented to situation  Cognition: Confused in conversation, Impulsive, Poor safety awareness, Unable to follow commands  Speech: Clear  Pupil Assesment: Yes  R Pupil Size (mm): 3  R Pupil Shape / Description: Round  R Pupil Reaction: Brisk  L Pupil Size (mm): 3  L Pupil  Shape / Description: Round  L Pupil Reaction: Brisk  RUE Motor Response: Responds to commands  RUE Sensation: Numbness, Tingling  Muscle Strength Right Arm: Good Strength Against Gravity and Moderate Resistance  RLE Motor Response: Responds to commands  RLE Sensation: Numbness, Tingling  Muscle Strength Right Leg: Good Strength Against Gravity and Moderate Resistance  EENT (WDL):  WDL Except    Cardio/Pulmonary Assessment  Edema      Respiratory Breath Sounds  RUL Breath Sounds: Clear, Diminished  RML Breath Sounds: Clear, Diminished  RLL Breath Sounds: Clear, Diminished  KASEY Breath Sounds: Clear, Diminished  LLL Breath Sounds: Clear, Diminished  Cardiac Assessment   Cardiac (WDL):  WDL Except (H/O HTN, CAD)

## 2024-02-21 NOTE — THERAPY
Physical Therapy   Daily Treatment     Patient Name: Lorraine Leung  Age:  78 y.o., Sex:  female  Medical Record #: [id number]  Today's Date: 2/21/2024     Precautions  Precautions: Fall Risk  Comments: [place], pt wears headphones amplifier, O2=3.5L no wean    Subjective    Pt was received in the main gym after OT session and was motivated to participate in physical therapy session.      Objective       02/21/24 0901   Vitals   Pulse 71   Pulse Oximetry 96 %   O2 (LPM) 2   O2 Delivery Device Silicone Nasal Cannula   Cognition    Level of Consciousness Alert   Gait Functional Level of Assist    Gait Level Of Assist Contact Guard Assist   Assistive Device Front Wheel Walker   Distance (Feet) 100  (In addition to 50 + 50)   # of Times Distance was Traveled 1   Deviation Bradykinetic;Increased Base Of Support;Decreased Heel Strike;Decreased Toe Off   Stairs Functional Level of Assist   Level of Assist with Stairs Contact Guard Assist   # of Stairs Climbed 4   Stairs Description Extra time;Hand rails;Oxygen;Supervision for safety;Verbal cueing   Transfer Functional Level of Assist   Bed, Chair, Wheelchair Transfer Contact Guard Assist   Bed Chair Wheelchair Transfer Description Verbal cueing;Supervision for safety;Initial preparation for task;Increased time   Interdisciplinary Plan of Care Collaboration   IDT Collaboration with  Occupational Therapist   Patient Position at End of Therapy Seated;Chair Alarm On;Call Light within Reach;Tray Table within Reach;Phone within Reach   Collaboration Comments Handoff from OT in main gym     Gait training using FWW (CGA):   - R and L visual scanning and picture identification to promote R-sided scanning when navigating environment 50' + 100' + 50'   - 1 self-corrected lob noted     Standing dynamic balance in // bars: 6 minutes + 4 minutes   - Cornhole bean bag toss CGA (R hand to grab bean bags on right side, L hand to toss bean bag).   - Alternating single UE support    - Seated rest  break before each effort     Assessment    Pt is improving in her functional strength and mobility and is progressing towards her goals nicely. Pt educated on importance of R-sided visual scanning d/t mild R neglect and verbally acknowledged importance. Pt is aware of endurance deficits and gives adequate time when she requires seated rest breaks. Pt able to perform dynamic balance with single UE support in // bars, but has notable sway when she has no UE support. Reports she feels most unstable with anterior weight shifts. Tolerated stairs well, but was fatigued to conclude session.     Strengths: Able to follow instructions, Adequate strength, Alert and oriented, Effective communication skills, Independent prior level of function, Motivated for self care and independence, Manages pain appropriately, Pleasant and cooperative, Supportive family, Willingly participates in therapeutic activities  Barriers: Fatigue, Decreased endurance, Hearing impairment, Hemiplegia, Impaired activity tolerance, Impaired balance, Pain    Plan    Check O2 saturation with exercise  Education in exercise with pacing  Transfers  Ambulation with FWW  Scanning R with activity    DME  PT DME Recommendations  Assistive Device: Front Wheeled Walker, Single Point Cane    Passport items to be completed:  Get in/out of bed safely, in/out of a vehicle, safely use mobility device, walk or wheel around home/community, navigate up and down stairs, show how to get up/down from the ground, ensure home is accessible, demonstrate HEP, complete caregiver training    Physical Therapy Problems (Active)       Problem: PT-Long Term Goals       Dates: Start:  02/10/24         Goal: LTG-By discharge, patient will tolerate standing for at least 5 minutes SPV with LRAD in order to facilitate standing ADLs       Dates: Start:  02/10/24            Goal: LTG-By discharge, patient will ambulate community distances of 150' SPV with LRAD       Dates: Start:  02/10/24             Goal: LTG-By discharge, patient will transfer one surface to another SPV using LRAD        Dates: Start:  02/10/24            Goal: LTG-By discharge, patient will ambulate up/down 4-6 stairs SPV using bilateral hand rails       Dates: Start:  02/10/24

## 2024-02-21 NOTE — PROGRESS NOTES
NURSING DAILY NOTE    Name: Lorraine Leung   Date of Admission: 2/9/2024   Admitting Diagnosis: Acute ischemic left MCA stroke (HCC)  Attending Physician: Carlin Powres M.d.  Allergies: Patient has no known allergies.    Safety  Patient Assist  Min Assist  Patient Precautions  Fall Risk  Precaution Comments  [place], pt wears headphones amplifier, O2=3.5L no wean  Bed Transfer Status  Contact Guard Assist  Toilet Transfer Status   Contact Guard Assist  Assistive Devices  Rails, Wheelchair  Oxygen  Silicone Nasal Cannula  Diet/Therapeutic Dining  Current Diet Order   Procedures    Diet Order Diet: Regular     Pill Administration  whole  Agitated Behavioral Scale  16  ABS Level of Severity  No Agitation    Fall Risk  Has the patient had a fall this admission?   No  Tiffani Hinton Fall Risk Scoring  26, HIGH RISK  Fall Risk Safety Measures  bed alarm, chair alarm, poor balance, and low vision/ hearing    Vitals  Temperature: 37.4 °C (99.3 °F)  Temp src: Oral  Pulse: (!) 108  Respiration: 19  Blood Pressure : 134/78  Blood Pressure MAP (Calculated): 97 MM HG  BP Location: Right, Upper Arm  Patient BP Position: Sitting     Oxygen  Pulse Oximetry: 95 %  O2 (LPM): 2  O2 Delivery Device: Silicone Nasal Cannula    Bowel and Bladder  Last Bowel Movement  02/16/24  Stool Type  Type 4: Like a sausage or snake, smooth and soft  Bowel Device  Bathroom, Other (Comment) (Bowel Meds)  Continent  Bladder: Did not void   Bowel: No movement  Bladder Function  Urine Void (mL):  (after taking vitals, offered to take pt to bathroom, pt. replied with a sarcastic lara NO!, asked if there was anything i could get for her...another lara NO!)  Number of Times Voided: 1  Urinary Options: Yes  Urine Color: Other (Comments)  Urine Clarity: Cloudy (Unable to see Through)  Urine Odor: Malodorous  Number of Times Incontinent of Urine: 1  Straight Catheter: 425 ml  Wet Diaper Count:  1  Genitourinary Assessment   Bladder Assessment (WDL):  WDL Except  [place] Catheter: Not Applicable  Urinary Elimination: Incontinence  Urine Color: Other (Comments)  Urine Clarity: Cloudy (Unable to see Through)  Urine Odor: Malodorous  Number of Bladder Accidents: 1  Total Number of Bladder of Accidents in Last 7 Days: 1  Number of Times Incontinent of Urine: 1  Bladder Device: Bathroom  Time Void: Yes  Bladder Scan: Post Void  $ Bladder Scan Results (mL): 70    Skin  Selwyn Score   15  Sensory Interventions   Bed Types:  (SOMA)  Skin Preventative Measures: Pillows in Use for Support / Positioning  Moisture Interventions  Moisturizers/Barriers: Barrier Wipes      Pain  Pain Rating Scale  0 - No Pain  Pain Location  Head, Neck  Pain Location Orientation  Posterior  Pain Interventions   Declines    ADLs    Bathing   Shower, * * With Assistance from, Staff  Linen Change   Complete  Personal Hygiene  Perineal Care  Chlorhexidine Bath      Oral Care  Brushed Teeth  Teeth/Dentures     Shave     Nutrition Percentage Eaten  Breakfast, Less than 25% Consumed (couple bites)  Environmental Precautions  Treaded Slipper Socks on Patient, Bed in Low Position  Patient Turns/Positioning  Sitting Up in Wheelchair  Patient Turns Assistance/Tolerance  Assistance of One  Bed Positions  Bed Controls On, Bed Locked  Head of Bed Elevated  Self regulated      Psychosocial/Neurologic Assessment  Psychosocial Assessment  Psychosocial (WDL):  WDL Except  Patient Behaviors: Flat Affect, Confused  Family Behaviors: Family Present  Neurologic Assessment  Neuro (WDL): Exceptions to WDL  Level of Consciousness: Alert  Orientation Level: Oriented to person, Oriented to place  Cognition: Follows commands, Memory Loss, Confused in conversation  Speech: Clear  Pupil Assesment: Yes  R Pupil Size (mm): 3  R Pupil Shape / Description: Round  R Pupil Reaction: Brisk  L Pupil Size (mm): 3  L Pupil Shape / Description: Round  L Pupil Reaction: Brisk  RUE  Motor Response: Responds to commands  RUE Sensation: Numbness, Tingling  Muscle Strength Right Arm: Good Strength Against Gravity and Moderate Resistance  RLE Motor Response: Responds to commands  RLE Sensation: Numbness, Tingling  Muscle Strength Right Leg: Good Strength Against Gravity and Moderate Resistance  EENT (WDL):  WDL Except    Cardio/Pulmonary Assessment  Edema      Respiratory Breath Sounds  RUL Breath Sounds: Clear, Diminished  RML Breath Sounds: Clear, Diminished  RLL Breath Sounds: Clear, Diminished  KASEY Breath Sounds: Clear, Diminished  LLL Breath Sounds: Clear, Diminished  Cardiac Assessment   Cardiac (WDL):  WDL Except

## 2024-02-21 NOTE — CARE PLAN
"  Problem: Knowledge Deficit - Standard  Goal: Patient and family/care givers will demonstrate understanding of plan of care, disease process/condition, diagnostic tests and medications  Outcome: Not Met  Note: Pt agrees with plan of care tonight regarding medications and safety.  Will continue to monitor patient.     Problem: Fall Risk - Rehab  Goal: Patient will remain free from falls  Outcome: Not Met  Note: Tiffani Hinton Fall risk Assessment Score: 30    High fall risk Interventions   - Alarming seatbelt  - Wander guard  - Bed and strip alarm   - Yellow sign by the door   - Yellow wrist band \"Fall risk\"  - Room near to the nurse station  - Do not leave patient unattended in the bathroom  - Fall risk education provided       The patient is Stable - Low risk of patient condition declining or worsening    Shift Goals  Clinical Goals: Safety  Patient Goals: Sleep well      Patient is not progressing towards the following goals:      Problem: Knowledge Deficit - Standard  Goal: Patient and family/care givers will demonstrate understanding of plan of care, disease process/condition, diagnostic tests and medications  Outcome: Not Met  Note: Pt agrees with plan of care tonight regarding medications and safety.  Will continue to monitor patient.     Problem: Fall Risk - Rehab  Goal: Patient will remain free from falls  Outcome: Not Met  Note: Tiffani Hinton Fall risk Assessment Score: 30    High fall risk Interventions   - Alarming seatbelt  - Wander guard  - Bed and strip alarm   - Yellow sign by the door   - Yellow wrist band \"Fall risk\"  - Room near to the nurse station  - Do not leave patient unattended in the bathroom  - Fall risk education provided          "

## 2024-02-21 NOTE — THERAPY
Occupational Therapy  Daily Treatment     Patient Name: Lorraine Leung  Age:  78 y.o., Sex:  female  Medical Record #: [id number]  Today's Date: 2/21/2024     Precautions  Precautions: (P) Fall Risk  Comments: (P) [place], pt wears headphones amplifier, O2=2L no wean    Safety   ADL Safety : Requires Supervision for Safety, Requires Physical Assist for Safety, Requires Cueing for Safety  Bathroom Safety: Requires Supervision for Safety, Requires Physical Assist for Safety, Requires Cuing for Safety    Subjective    Pt in bed upon arrival, agreeable to participate in OT. Apologetic re: not being herself previously.      Objective     02/21/24 1331   OT Charge Group   OT Self Care / ADL (Units) 2   OT Neuromuscular Re-education / Balance (Units) 1   OT Therapy Activity (Units) 1   OT Total Time Spent   OT Individual Total Time Spent (Mins) 60   Precautions   Precautions Fall Risk   Comments [place], pt wears headphones amplifier, O2=2L no wean   Cognition    Level of Consciousness Alert   Sleep/Wake Cycle   Sleep & Rest Awake   Functional Level of Assist   Grooming Standby Assist;Standing  (to wash hands while standing @ sink)   Bed, Chair, Wheelchair Transfer Contact Guard Assist  (FWW level)   Toilet Transfers Contact Guard Assist  (FWW level)   Tub / Shower Transfers Minimal Assist  (Min to CGA w/ FWW, dry WIS txfr to simulate custodial setup.)   Bed Mobility    Supine to Sit Standby Assist   Scooting Contact Guard Assist   Interdisciplinary Plan of Care Collaboration   IDT Collaboration with  Family / Caregiver;Other (See Comments)   Patient Position at End of Therapy Seated;Self Releasing Lap Belt Applied;Other (Comments)   Collaboration Comments Daughter and [employer] rep present during OT session     Community mobility w/ FWW ~75' w/ min to CGA, limited by decreased RLE coordination,decreased R side attention, and impaired balance     CGA for toileting     Pt able to don/doff socks w/ mildly increased time/SBA @ bed level, no AE  needed     Assessment    Patient maddison's significant improvement w/ activity tolerance/overall participation this session. Demo's ability to complete FWW mobility (indoor/outdoor) @ min to CGA level and ADLs @ CGA to SBA level. Intermittent cues required to attend to R side of envt. YON nurse/rep present during OT session to evaluate patient's functional status/intermediate needs.   Strengths: Making steady progress towards goals, Motivated for self care and independence, Pleasant and cooperative, Supportive family, Willingly participates in therapeutic activities  Barriers: Fatigue, Hemiparesis, Generalized weakness, Hearing impairment, Impaired activity tolerance, Impaired balance, Impaired insight/denial of deficits, Limited mobility    Plan    ADL re-assesment tomorrow AM @ 830    DME    Passport items to be completed:  Perform bathroom transfers, complete dressing, complete feeding, get ready for the day, prepare a simple meal, participate in household tasks, adapt home for safety needs, demonstrate home exercise program, complete caregiver training     Occupational Therapy Goals (Active)       Problem: Dressing       Dates: Start:  02/10/24         Goal: STG-Within one week, patient will dress UB at Mod Indep       Dates: Start:  02/10/24            Goal: STG-Within one week, patient will dress LB at Sup via AE/DME PRN       Dates: Start:  02/10/24               Problem: Functional Transfers       Dates: Start:  02/10/24         Goal: STG-Within one week, patient will transfer to toilet at Sup via DME       Dates: Start:  02/10/24               Problem: IADL's       Dates: Start:  02/10/24         Goal: STG-Within one week, patient will prepare a simple meal at Min assist via AE/DME PRN.       Dates: Start:  02/10/24               Problem: OT Long Term Goals       Dates: Start:  02/10/24         Goal: LTG-By discharge, patient will complete basic self care tasks at Sup to Mod Indep via AE/DME PRN.       Dates: Start:   02/10/24            Goal: LTG-By discharge, patient will perform bathroom transfers at [facility] via DME.       Dates: Start:  02/10/24               Problem: Toileting       Dates: Start:  02/10/24         Goal: STG-Within one week, patient will complete toileting tasks at [facility] via AE/DME PRN - including management of disposable briefs and hygiene.       Dates: Start:  02/10/24

## 2024-02-21 NOTE — FLOWSHEET NOTE
02/21/24 0640   Events/Summary/Plan   Events/Summary/Plan mdis   Vital Signs   Pulse 98   Respiration 16   Pulse Oximetry 98 %   $ Pulse Oximetry (Spot Check) Yes   Respiratory Assessment   Level of Consciousness Alert   Chest Exam   Work Of Breathing / Effort Within Normal Limits   Breath Sounds   RUL Breath Sounds Diminished   RML Breath Sounds Diminished   RLL Breath Sounds Diminished   KASEY Breath Sounds Diminished   LLL Breath Sounds Diminished   Oxygen   O2 (LPM) 2   O2 Delivery Device Silicone Nasal Cannula

## 2024-02-22 ENCOUNTER — APPOINTMENT (OUTPATIENT)
Dept: OCCUPATIONAL THERAPY | Facility: REHABILITATION | Age: 79
DRG: 056 | End: 2024-02-22
Attending: PHYSICAL MEDICINE & REHABILITATION
Payer: MEDICARE

## 2024-02-22 ENCOUNTER — APPOINTMENT (OUTPATIENT)
Dept: INPATIENT REHAB | Facility: REHABILITATION | Age: 79
DRG: 056 | End: 2024-02-22
Payer: MEDICARE

## 2024-02-22 ENCOUNTER — APPOINTMENT (OUTPATIENT)
Dept: SPEECH THERAPY | Facility: REHABILITATION | Age: 79
DRG: 056 | End: 2024-02-22
Attending: PHYSICAL MEDICINE & REHABILITATION
Payer: MEDICARE

## 2024-02-22 ENCOUNTER — APPOINTMENT (OUTPATIENT)
Dept: PHYSICAL THERAPY | Facility: REHABILITATION | Age: 79
DRG: 056 | End: 2024-02-22
Attending: PHYSICAL MEDICINE & REHABILITATION
Payer: MEDICARE

## 2024-02-22 LAB
BACTERIA BLD CULT: NORMAL
BACTERIA BLD CULT: NORMAL
EKG IMPRESSION: NORMAL
EKG IMPRESSION: NORMAL
SIGNIFICANT IND 70042: NORMAL
SIGNIFICANT IND 70042: NORMAL
SITE SITE: NORMAL
SITE SITE: NORMAL
SOURCE SOURCE: NORMAL
SOURCE SOURCE: NORMAL

## 2024-02-22 PROCEDURE — 770010 HCHG ROOM/CARE - REHAB SEMI PRIVAT*

## 2024-02-22 PROCEDURE — 700111 HCHG RX REV CODE 636 W/ 250 OVERRIDE (IP): Mod: JZ | Performed by: PHYSICAL MEDICINE & REHABILITATION

## 2024-02-22 PROCEDURE — 97116 GAIT TRAINING THERAPY: CPT

## 2024-02-22 PROCEDURE — 700102 HCHG RX REV CODE 250 W/ 637 OVERRIDE(OP): Performed by: PHYSICAL MEDICINE & REHABILITATION

## 2024-02-22 PROCEDURE — 97530 THERAPEUTIC ACTIVITIES: CPT

## 2024-02-22 PROCEDURE — 97535 SELF CARE MNGMENT TRAINING: CPT

## 2024-02-22 PROCEDURE — A9270 NON-COVERED ITEM OR SERVICE: HCPCS | Performed by: PHYSICAL MEDICINE & REHABILITATION

## 2024-02-22 PROCEDURE — 94760 N-INVAS EAR/PLS OXIMETRY 1: CPT

## 2024-02-22 PROCEDURE — 97129 THER IVNTJ 1ST 15 MIN: CPT

## 2024-02-22 PROCEDURE — 93005 ELECTROCARDIOGRAM TRACING: CPT | Performed by: PHYSICAL MEDICINE & REHABILITATION

## 2024-02-22 PROCEDURE — 97130 THER IVNTJ EA ADDL 15 MIN: CPT

## 2024-02-22 PROCEDURE — 93010 ELECTROCARDIOGRAM REPORT: CPT | Performed by: INTERNAL MEDICINE

## 2024-02-22 PROCEDURE — 94640 AIRWAY INHALATION TREATMENT: CPT

## 2024-02-22 RX ORDER — OLANZAPINE 2.5 MG/1
2.5 TABLET, FILM COATED ORAL 2 TIMES DAILY
Status: DISCONTINUED | OUTPATIENT
Start: 2024-02-22 | End: 2024-02-23 | Stop reason: HOSPADM

## 2024-02-22 RX ADMIN — ASPIRIN 81 MG 81 MG: 81 TABLET ORAL at 08:33

## 2024-02-22 RX ADMIN — MOMETASONE FUROATE AND FORMOTEROL FUMARATE DIHYDRATE 2 PUFF: 100; 5 AEROSOL RESPIRATORY (INHALATION) at 08:26

## 2024-02-22 RX ADMIN — CEFDINIR 300 MG: 300 CAPSULE ORAL at 20:28

## 2024-02-22 RX ADMIN — ENOXAPARIN SODIUM 40 MG: 100 INJECTION SUBCUTANEOUS at 17:46

## 2024-02-22 RX ADMIN — METOPROLOL TARTRATE 25 MG: 25 TABLET, FILM COATED ORAL at 12:03

## 2024-02-22 RX ADMIN — CLOPIDOGREL BISULFATE 75 MG: 75 TABLET ORAL at 08:33

## 2024-02-22 RX ADMIN — TIOTROPIUM BROMIDE INHALATION SPRAY 5 MCG: 3.12 SPRAY, METERED RESPIRATORY (INHALATION) at 08:26

## 2024-02-22 RX ADMIN — FAMOTIDINE 20 MG: 20 TABLET ORAL at 08:33

## 2024-02-22 RX ADMIN — Medication 100 MG: at 08:33

## 2024-02-22 RX ADMIN — OLANZAPINE 2.5 MG: 2.5 TABLET, FILM COATED ORAL at 17:46

## 2024-02-22 RX ADMIN — FAMOTIDINE 20 MG: 20 TABLET ORAL at 20:27

## 2024-02-22 RX ADMIN — CEFDINIR 300 MG: 300 CAPSULE ORAL at 08:33

## 2024-02-22 RX ADMIN — METOPROLOL TARTRATE 25 MG: 25 TABLET, FILM COATED ORAL at 14:21

## 2024-02-22 RX ADMIN — ATORVASTATIN CALCIUM 40 MG: 40 TABLET, FILM COATED ORAL at 20:28

## 2024-02-22 RX ADMIN — TRAZODONE HYDROCHLORIDE 50 MG: 50 TABLET ORAL at 20:27

## 2024-02-22 RX ADMIN — ESCITALOPRAM OXALATE 10 MG: 10 TABLET ORAL at 08:33

## 2024-02-22 RX ADMIN — OLANZAPINE 5 MG: 2.5 TABLET, FILM COATED ORAL at 06:08

## 2024-02-22 RX ADMIN — Medication 3 MG: at 17:46

## 2024-02-22 RX ADMIN — ACETAMINOPHEN 650 MG: 325 TABLET ORAL at 08:39

## 2024-02-22 ASSESSMENT — GAIT ASSESSMENTS
GAIT LEVEL OF ASSIST: STANDBY ASSIST
DEVIATION: STEP TO;BRADYKINETIC;DECREASED HEEL STRIKE;DECREASED TOE OFF
DISTANCE (FEET): 300
ASSISTIVE DEVICE: FRONT WHEEL WALKER

## 2024-02-22 ASSESSMENT — PAIN DESCRIPTION - PAIN TYPE: TYPE: ACUTE PAIN

## 2024-02-22 ASSESSMENT — ACTIVITIES OF DAILY LIVING (ADL): BED_CHAIR_WHEELCHAIR_TRANSFER_DESCRIPTION: SQUAT PIVOT TRANSFER TO WHEELCHAIR;SUPERVISION FOR SAFETY

## 2024-02-22 NOTE — CARE PLAN
The patient is Stable - Low risk of patient condition declining or worsening    Shift Goals  Clinical Goals: safety, no behaviors  Patient Goals: rest    Progress made toward(s) clinical / shift goals:  Patient is a/o x4 throughout shift, c/o headaches, administered prn tylenol with good relief. Patient with no behaviors or aggressiveness towards staff. Patient uses call light appropriately and alarms in place for safety.      Problem: Skin Integrity  Goal: Patient's skin integrity will be maintained or improve  Outcome: Progressing     Problem: Nutrition  Goal: Patient's nutritional and fluid intake will be adequate or improve  Outcome: Progressing     Problem: Self Care  Goal: Patient will have the ability to perform ADLs independently or with assistance  Outcome: Progressing

## 2024-02-22 NOTE — DIETARY
Nutrition Update:    Day 13 of admit.  Lorraine Leung is a 78 y.o. female with admitting DX of Acute ischemic left MCA stroke (HCC) [I63.512].  Patient being followed to optimize nutrition.    Current Diet: regular    Pt with improving cognition and behavior per MD's note. Recorded PO intake has improved from <25% to 50-75% x 1 and % x1 on 2/21 and 2/22. Improved intake most likely r/t improved cognition.     Problem: Nutritional:  Goal: Achieve adequate nutritional intake  Description: Patient will consume >50% of meals and supplements  Outcome: progressing    RD following.

## 2024-02-22 NOTE — PROGRESS NOTES
"  Physical Medicine & Rehabilitation Progress Note    Encounter Date: 2/22/2024    Chief Complaint: Weakness    Interval Events (Subjective):  Cognition and behavior continues to improve over yesterday, blood pressure stable. Patient intermittantly tachycardic. Patient anxious and restless. Discussed case with patient and daughter,    Objective:  VITAL SIGNS: /79   Pulse (!) 106 Comment: notified RN Angie  Temp 36.7 °C (98 °F) (Oral)   Resp 18   Ht 1.676 m (5' 6\")   Wt 63 kg (138 lb 14.2 oz)   SpO2 98%   BMI 22.42 kg/m²   Gen: No acute distress, well developed well nourished adult  HEENT: Normal Cephalic Atraumatic, Normal conjunctiva.   CV: warm extremities, well perfused, no edema  Resp: symmetric chest rise, breathing comfortably on room air  Abd: Soft, Non distended  Extremities: normal bulk, no atrophy  Skin: no visible rashes or lesions.   Neuro: alert, awake  Psych: Mood and affect appropriate and congruent    Laboratory Values:  Recent Results (from the past 72 hour(s))   Quantiferon Gold Plus TB (4 tube)    Collection Time: 02/20/24  2:17 PM   Result Value Ref Range    QFT NIL 0.04 IU/mL    QFT TB1 - NIL 0.00 <=0.34 IU/mL    QFT TB2 - NIL -0.01 <=0.34 IU/mL    QFT Mitogen - NIL >10.00 IU/mL    QFT Gold Plus Negative Negative       Medications:  Scheduled Medications   Medication Dose Frequency    metoprolol tartrate  25 mg BID    famotidine  20 mg BID    cefdinir  300 mg Q12HRS    escitalopram  10 mg DAILY    thiamine  100 mg DAILY    OLANZapine  5 mg BID    mometasone-formoterol  2 Puff BID (RT)    tiotropium  5 mcg QDAILY (RT)    traZODone  50 mg QHS    [Held by provider] losartan  50 mg Q DAY    melatonin  3 mg AFTER DINNER    [Held by provider] amLODIPine  10 mg Q DAY    aspirin  81 mg DAILY    atorvastatin  40 mg Q EVENING    clopidogrel  75 mg DAILY    montelukast  10 mg Nightly    Pharmacy Consult Request  1 Each PHARMACY TO DOSE    senna-docusate  2 Tablet BID    enoxaparin (LOVENOX) " injection  40 mg DAILY AT 1800     PRN medications: ziprasidone, OLANZapine, acetaminophen, Respiratory Therapy Consult, hydrALAZINE, senna-docusate **AND** polyethylene glycol/lytes, ondansetron **OR** ondansetron, sodium chloride    Diet:  Current Diet Order   Procedures    Diet Order Diet: Regular       Medical Decision Making and Plan:  CVA:  - Patient with L MCA stroke on 1/6. Etiology likely Unclear . Received NO intervention  - Continue ASA, Pavix and statin for secondary stroke prophylaxis.  -Zio patch     Severe Acute metabolic encephalopathy  Verbally and physically agitated towards staff without provokation  -2/16 -Increased Zyprexa to 5mg BID. Hoping to give a dose at night but may need to use GEODON  -Geodon 5mg PRN, 10mg Geodon caused patient to sleep for 3-4hrs. Decreased to 5mg.  -Continue to empirically treat UTI with bactrim. IM Rocephin given on 2/16 as she refused all medications   - 2/18 patient has not required IM geodon since  10Am yesterday, patient did take two doses of abx yesterday.   -2/19- Patient required Geodon at 11am.   -2/20 IM 10mg Geodon at 6am as patient would not participate. After pleasant, cooperative. Will trial out of posey bed. Patient claustrophobic.  -2/22- Wean Zyprexa to 2.5mg BID to help with restlessness    Tachycardia, restlessness, anxiety  Started 2/22- likely due to COPD medications  -Stop all COPD medications  -Stop Spiriva, tiotropium and Dulera  -patient was not on these medicaitons at home per patient and daughter  -EKG 2/22 shows sinus tachycardia  -Restart Metoprolol 50mg BID       Elevated lactic acid- reolved  - Lactic acid 2.8  - likely due to UTI, Hospitalist consulted   - patient did take two doses of oral bactrim on 2/17      UTI  - UA + for UTI on 2/16  - s/p one dose IM rocephin 100mg on 2/16  - will plan to repeat dose for treatment (patient unwilling to cooperative with PO meds or IV)   - 2/18 goal is get patient to take another dose of PO bactrim,  if not, may need repeat IM rocephin   - 2/19- UA from 2/16 grossly positive. Will send to culture  -Restart Abx. 2/19 start Rocephin 1g daily IM for 5 days. Cultures and sensitivities back  -2/20 continue Rocephin  -2/21- transition to oral abx Omnicef 300mg BID. Through 2/24/24     Globes sensation with swallowing  -consider GI referall     Factor V leiden  -has not been on anticoagulation for 10 years  -bilateral  US to rule out blood clots negative on 2/11  -Continue ASA, Plavix. Follow up with Heme  -referral sent to hematology     Vertigo  -PT to test BPPV  -Meclizine 12.5mg BID  -holding meclizine     CAD  -Plavix     COPD  -on 3l at home, currently on 3.5L     HTN  Home meds Norvasc 10mg daily, Losartan 100mg daily, Lopressor 100mg BID  -2/14- decrease Losartan to 50mg daily due to hypotension and poor po intake  -2/15- refusing BP medication  -2/16- refusing all vitals and bp meds  -continue norvasc, losartan and lopressor  -2/18 intermittmently elevated,hospitalist following   -2/20 IM signed off.  -2/20- Hold all BP meds  -2/21- continue to hold bp meds  2/22- [id number] BID of lopressor.  -Consider restarting low dose of Losartan if needed  -Monitor BP 2 times daily     Lumbar back pain  Flexeril and gabapentin  -2/14- DC flexeril and gabapentin due to delerium  -2/15- restarted flexeril 10mg TID PRN as patient triggered by not getting her muscle relaxor  -Holding all pain medications  -continue to hold all pain medications due to high risk of delerium  -Recommend not restarting flexeril or gabapentin. If back pain becomes a problem may trial low dose Flexeril; however, risk of delerium increases.          Mood  Anxiety  -Paxil  -2/14-Wean off Paxil due to delerium. 20mg daily  -Now on Paxil 20mg daily  -2/21- start Lexipro 10mg daily. DC Paxil due to increased risk of delerium  -PCP may want to increase lexipro in the outpatient setting.        Pain:  - Neuroceptic - continue tylenol     DVT  prophylaxis: continue lovenox     GI prophylaxis:  2/21- start pepcid for heartburn    ____________________________________    Carlin Powers MD  Physical Medicine & Rehabilitation   Brain Injury Medicine   ____________________________________  Total time:  60 minutes. Time spent included pre-rounding review of vitals and tests, unit/floor time, face-to-face time with the patient including physical examination, care coordination, counseling of patient and/or family, ordering medications/procedures/tests, discussion with CM, PT, OT, SLP and/or other healthcare providers, and documentation in the electronic medical record. Topics discussed included medical management of anxiety, tachycardia and restlessness. Disposition setup with assisted living

## 2024-02-22 NOTE — PROGRESS NOTES
"This RN offered pt her NOC meds, to include senna- pt became irritated and said \"I am tired of being bullied into trying to take the docusate.\" I told pt I will leave a note on her whiteboard and another in the chart asking to DC the RX.   "

## 2024-02-22 NOTE — PROGRESS NOTES
NURSING DAILY NOTE    Name: Lorraine Leung   Date of Admission: 2/9/2024   Admitting Diagnosis: Acute ischemic left MCA stroke (HCC)  Attending Physician: Carlin Powers M.d.  Allergies: Patient has no known allergies.    Safety  Patient Assist  Min assist  Patient Precautions  Fall Risk  Precaution Comments  [place], pt wears headphones amplifier, O2=2L no wean  Bed Transfer Status  Contact Guard Assist (FWW level)  Toilet Transfer Status   Contact Guard Assist (FWW level)  Assistive Devices  Gait Belt, Rails, Wheelchair  Oxygen  Nasal Cannula  Diet/Therapeutic Dining  Current Diet Order   Procedures    Diet Order Diet: Regular     Pill Administration  whole  Agitated Behavioral Scale  16  ABS Level of Severity  No Agitation    Fall Risk  Has the patient had a fall this admission?   No  Tiffani Hinton Fall Risk Scoring  30, HIGH RISK  Fall Risk Safety Measures  bed alarm and chair alarm    Vitals  Temperature: 37.2 °C (98.9 °F)  Temp src: Oral  Pulse: 65  Respiration: 17  Blood Pressure : 115/84  Blood Pressure MAP (Calculated): 94 MM HG  BP Location: Right, Upper Arm  Patient BP Position: Sitting     Oxygen  Pulse Oximetry: 95 %  O2 (LPM): 2  O2 Delivery Device: Nasal Cannula    Bowel and Bladder  Last Bowel Movement  02/21/24  Stool Type  Type 5: Soft blob with clear cut edges (passed easily)  Bowel Device  Bathroom  Continent  Bladder: Did not void   Bowel: No movement  Bladder Function  Urine Void (mL):  (after taking vitals, offered to take pt to bathroom, pt. replied with a sarcastic lara NO!, asked if there was anything i could get for her...another lara NO!)  Number of Times Voided: 1  Urinary Options: Yes  Urine Color: Yellow  Urine Clarity: Clear  Urine Odor: Malodorous  Number of Times Incontinent of Urine: 1  Straight Catheter: 425 ml  Wet Diaper Count: 1  Genitourinary Assessment   Bladder Assessment (WDL):  WDL Except  Villela Catheter: Not  Applicable  Urinary Elimination: Incontinence  Urine Color: Yellow  Urine Clarity: Clear  Urine Odor: Malodorous  Number of Bladder Accidents: 1  Total Number of Bladder of Accidents in Last 7 Days: 1  Number of Times Incontinent of Urine: 1  Bladder Device: Bathroom  Time Void: Yes  Bladder Scan: Post Void  $ Bladder Scan Results (mL): 70    Skin  Selwyn Score   15  Sensory Interventions   Bed Types: Standard/Trauma Mattress  Skin Preventative Measures: Pillows in Use for Support / Positioning  Moisture Interventions  Moisturizers/Barriers: Barrier Paste, Barrier Wipes      Pain  Pain Rating Scale  3 - Sometimes distracts me  Pain Location  Head, Neck  Pain Location Orientation  Posterior  Pain Interventions   Medication (see MAR)    ADLs    Bathing   Shower, * * With Assistance from, Staff  Linen Change   Partial  Personal Hygiene  Perineal Care, Moist Octavia Wipes, Change Octavia Pads  Chlorhexidine Bath      Oral Care  Brushed Teeth  Teeth/Dentures     Shave     Nutrition Percentage Eaten  Breakfast, Between % Consumed  Environmental Precautions  Treaded Slipper Socks on Patient, Bed in Low Position  Patient Turns/Positioning  Patient Turns Self from Side to Side  Patient Turns Assistance/Tolerance  Assistance of One  Bed Positions  Bed Controls On, Bed Locked  Head of Bed Elevated  Self regulated      Psychosocial/Neurologic Assessment  Psychosocial Assessment  Psychosocial (WDL):  WDL Except  Patient Behaviors: Confused  Family Behaviors: Family Present  Neurologic Assessment  Neuro (WDL): Exceptions to WDL  Level of Consciousness: Alert  Orientation Level: Disoriented to time, Disoriented to situation  Cognition: Confused in conversation, Impulsive, Poor safety awareness, Unable to follow commands  Speech: Clear  Pupil Assesment: Yes  R Pupil Size (mm): 3  R Pupil Shape / Description: Round  R Pupil Reaction: Brisk  L Pupil Size (mm): 3  L Pupil Shape / Description: Round  L Pupil Reaction: Brisk  RUE Motor  Response: Responds to commands  RUE Sensation: Numbness, Tingling  Muscle Strength Right Arm: Good Strength Against Gravity and Moderate Resistance  RLE Motor Response: Responds to commands  RLE Sensation: Numbness, Tingling  Muscle Strength Right Leg: Good Strength Against Gravity and Moderate Resistance  EENT (WDL):  WDL Except    Cardio/Pulmonary Assessment  Edema      Respiratory Breath Sounds  RUL Breath Sounds: Diminished  RML Breath Sounds: Diminished  RLL Breath Sounds: Diminished  KASEY Breath Sounds: Diminished  LLL Breath Sounds: Diminished  Cardiac Assessment   Cardiac (WDL):  WDL Except (Hx HTN, CAD)

## 2024-02-22 NOTE — THERAPY
"Occupational Therapy  Daily Treatment     Patient Name: Lorraine Leung  Age:  78 y.o., Sex:  female  Medical Record #: [id number]  Today's Date: 2/22/2024     Precautions  Precautions: Fall Risk  Comments: [place], pt wears headphones amplifier, O2=2L no wean    Safety   ADL Safety : Requires Supervision for Safety, Requires Physical Assist for Safety, Requires Cueing for Safety  Bathroom Safety: Requires Supervision for Safety, Requires Physical Assist for Safety, Requires Cuing for Safety    Subjective    Pt agreeable to participate in OT however presented w/ increased anxiety (concerned re: DC tomorrow being \"cancelled\").     Objective       02/22/24 1331   Therapy Missed   Missed Therapy (Minutes) 15   Reason For Missed Therapy Medical - Patient on Hold from Therapy  (MD rounding w/ patient; increased restlessness/anxiety)   OT Charge Group   OT Self Care / ADL (Units) 2   OT Therapy Activity (Units) 1   OT Total Time Spent   OT Individual Total Time Spent (Mins) 45   Vitals   O2 (LPM) 2   O2 Delivery Device Nasal Cannula   Sleep/Wake Cycle   Sleep & Rest Awake   Functional Level of Assist   Eating Supervision  (Setup)   Grooming Standby Assist;Standing   Lower Body Dressing Contact Guard Assist  (to don elastic waist pants while incorporating sitting and standing w/ FWW)   Toileting Contact Guard Assist   Toilet Transfers Contact Guard Assist  (FWW level)   Interdisciplinary Plan of Care Collaboration   IDT Collaboration with  Family / Caregiver;Physician   Patient Position at End of Therapy Seated   Collaboration Comments Daughter present during 1330 session; MD rounded w/ patient     Min A to CGA for FWW mobility ~ 100' w/ intermittent cues to look up/for proximity of walker    Elevated HR (120bpm) noted following FWW mobility     Assessment    Pt limited by increased anxiety/restlessness today, requiring frequent encouragement/reassurance that DC is still scheduled for tomorrow. CGA to SBA overall for ADLs. "   Strengths: Making steady progress towards goals, Motivated for self care and independence, Pleasant and cooperative, Supportive family, Willingly participates in therapeutic activities  Barriers: Fatigue, Hemiparesis, Generalized weakness, Hearing impairment, Impaired activity tolerance, Impaired balance, Impaired insight/denial of deficits, Limited mobility    Plan    Anticipate DC tomorrow to [facility] in [place]. Day of DC IRF Kim tomorrow     Occupational Therapy Goals (Active)       Problem: Dressing       Dates: Start:  02/10/24         Goal: STG-Within one week, patient will dress UB at Mod Indep       Dates: Start:  02/10/24            Goal: STG-Within one week, patient will dress LB at Sup via AE/DME PRN       Dates: Start:  02/10/24               Problem: Functional Transfers       Dates: Start:  02/10/24         Goal: STG-Within one week, patient will transfer to toilet at Sup via DME       Dates: Start:  02/10/24               Problem: IADL's       Dates: Start:  02/10/24         Goal: STG-Within one week, patient will prepare a simple meal at Min assist via AE/DME PRN.       Dates: Start:  02/10/24               Problem: OT Long Term Goals       Dates: Start:  02/10/24         Goal: LTG-By discharge, patient will complete basic self care tasks at Sup to Mod Indep via AE/DME PRN.       Dates: Start:  02/10/24            Goal: LTG-By discharge, patient will perform bathroom transfers at Sup via DME.       Dates: Start:  02/10/24               Problem: Toileting       Dates: Start:  02/10/24         Goal: STG-Within one week, patient will complete toileting tasks at Sup via AE/DME PRN - including management of disposable briefs and hygiene.       Dates: Start:  02/10/24

## 2024-02-22 NOTE — CARE PLAN
"The patient is Watcher - Medium risk of patient condition declining or worsening    Shift Goals  Clinical Goals: safety, sleep  Patient Goals: sleep    Progress made toward(s) clinical / shift goals:    Problem: Fall Risk - Rehab  Goal: Patient will remain free from falls  Outcome: Progressing  Note: Tiffani Hinton Fall risk Assessment Score: 30    High fall risk Interventions   - Alarming seatbelt  - Bed and strip alarm   - Yellow sign by the door   - Yellow wrist band \"Fall risk\"  - Room near to the nurse station  - Do not leave patient unattended in the bathroom  - Fall risk education provided       "

## 2024-02-22 NOTE — PROGRESS NOTES
NURSING DAILY NOTE    Name: Lorraine Leung   Date of Admission: 2/9/2024   Admitting Diagnosis: Acute ischemic left MCA stroke (HCC)  Attending Physician: Carlin Powers M.d.  Allergies: Patient has no known allergies.    Safety  Patient Assist  Min assist  Patient Precautions  Fall Risk  Precaution Comments  [place], pt wears headphones amplifier, O2=2L no wean  Bed Transfer Status  Contact Guard Assist (FWW level)  Toilet Transfer Status   Contact Guard Assist (FWW level)  Assistive Devices  Rails, Wheelchair  Oxygen  Nasal Cannula  Diet/Therapeutic Dining  Current Diet Order   Procedures    Diet Order Diet: Regular     Pill Administration  whole  Agitated Behavioral Scale  16  ABS Level of Severity  No Agitation    Fall Risk  Has the patient had a fall this admission?   No  Tiffani Hinton Fall Risk Scoring  30, HIGH RISK  Fall Risk Safety Measures  bed alarm and chair alarm    Vitals  Temperature: 37.2 °C (98.9 °F)  Temp src: Oral  Pulse: 65  Respiration: 17  Blood Pressure : 115/84  Blood Pressure MAP (Calculated): 94 MM HG  BP Location: Right, Upper Arm  Patient BP Position: Sitting     Oxygen  Pulse Oximetry: 95 %  O2 (LPM): 2  O2 Delivery Device: Nasal Cannula    Bowel and Bladder  Last Bowel Movement  02/21/24  Stool Type  Type 5: Soft blob with clear cut edges (passed easily)  Bowel Device  Bathroom  Continent  Bladder: Did not void   Bowel: No movement  Bladder Function  Urine Void (mL):  (large)  Number of Times Voided: 1  Urinary Options: Yes  Urine Color: Yellow  Urine Clarity: Clear  Urine Odor: Malodorous  Number of Times Incontinent of Urine: 1  Straight Catheter: 425 ml  Wet Diaper Count: 1  Genitourinary Assessment   Bladder Assessment (WDL):  WDL Except  Villela Catheter: Not Applicable  Urinary Elimination: Incontinence  Urine Color: Yellow  Urine Clarity: Clear  Urine Odor: Malodorous  Number of Bladder Accidents: 1  Total Number of Bladder of  Accidents in Last 7 Days: 1  Number of Times Incontinent of Urine: 1  Bladder Device: Bathroom  Time Void: Yes  Bladder Scan: Post Void  $ Bladder Scan Results (mL): 70    Skin  Selwyn Score   15  Sensory Interventions   Bed Types: Standard/Trauma Mattress  Skin Preventative Measures: Pillows in Use for Support / Positioning  Moisture Interventions  Moisturizers/Barriers: Barrier Paste, Barrier Wipes      Pain  Pain Rating Scale  0 - No Pain  Pain Location  Head, Neck  Pain Location Orientation  Posterior  Pain Interventions   Declines    ADLs    Bathing   Shower, * * With Assistance from, Staff  Linen Change   Partial  Personal Hygiene  Perineal Care, Moist Octavia Wipes, Change Octavia Pads  Chlorhexidine Bath      Oral Care  Brushed Teeth  Teeth/Dentures     Shave     Nutrition Percentage Eaten  Breakfast, Between % Consumed  Environmental Precautions  Treaded Slipper Socks on Patient, Personal Belongings, Wastebasket, Call Bell etc. in Easy Reach, Transferred to Stronger Side, Report Given to Other Health Care Providers Regarding Fall Risk, Bed in Low Position  Patient Turns/Positioning  Patient Turns Self from Side to Side  Patient Turns Assistance/Tolerance  Assistance of One  Bed Positions  Bed Controls On, Bed Locked  Head of Bed Elevated  Self regulated      Psychosocial/Neurologic Assessment  Psychosocial Assessment  Psychosocial (WDL):  WDL Except  Patient Behaviors: Anxious, Fatigue  Family Behaviors: Family Present  Neurologic Assessment  Neuro (WDL): Exceptions to WDL  Level of Consciousness: Alert  Orientation Level: Disoriented to time, Disoriented to situation  Cognition: Confused in conversation, Impulsive, Poor safety awareness, Unable to follow commands  Speech: Clear  Pupil Assesment: Yes  R Pupil Size (mm): 3  R Pupil Shape / Description: Round  R Pupil Reaction: Brisk  L Pupil Size (mm): 3  L Pupil Shape / Description: Round  L Pupil Reaction: Brisk  RUE Motor Response: Responds to  commands  RUE Sensation: Numbness, Tingling  Muscle Strength Right Arm: Good Strength Against Gravity and Moderate Resistance  RLE Motor Response: Responds to commands  RLE Sensation: Numbness, Tingling  Muscle Strength Right Leg: Good Strength Against Gravity and Moderate Resistance  EENT (WDL):  WDL Except    Cardio/Pulmonary Assessment  Edema      Respiratory Breath Sounds  RUL Breath Sounds: Diminished  RML Breath Sounds: Diminished  RLL Breath Sounds: Diminished  KASEY Breath Sounds: Diminished  LLL Breath Sounds: Diminished  Cardiac Assessment   Cardiac (WDL):  WDL Except (Hx HTN, CAD)

## 2024-02-22 NOTE — THERAPY
Speech Language Pathology  Daily Treatment     Patient Name: Lorraine Leung  Age:  78 y.o., Sex:  female  Medical Record #: [id number]  Today's Date: 2/22/2024     Precautions  Precautions: Fall Risk  Comments: [place], pt wears headphones amplifier, O2=2L no wean    Subjective    Pt in bed, labile. Comfort and encouragement provided at which time pt was agreeable to transfer and attend therapy session in ST office.     Objective       02/22/24 0934   Treatment Charges   SLP Cognitive Skill Development First 15 Minutes 1   SLP Cognitive Skill Development Additional 15 Minutes 3   SLP Total Time Spent   SLP Individual Total Time Spent (Mins) 60   Cognition   Cognitive-Linguistic (WDL) X   Orientation  Supervision (5)   Functional Math / Financial Management Minimal (4)   Interdisciplinary Plan of Care Collaboration   IDT Collaboration with  Certified Nursing Assistant;Other (See Comments)  (nursing student)   Patient Position at End of Therapy Seated;Edge of Bed;Call Light within Reach;Tray Table within Reach   Collaboration Comments CLOF   Speech Language Pathologist Assigned   Assigned SLP / Treatment Time / Comments EA/60 cog only         Assessment    Follow up outcome assessment not completed this date due to time constraints, pt required extra time to transfer to w/c and then needing to toilet. Orientation log :27/30 (slight decrease from day prior of 29). Med ID errors using colored illustrations - pt able to locate sorting errors in 11/16 trials IND, pt using self talk and finger tracking to maintain attention. Recommend family sort meds for patient in pill organizer and also call patient in a.m./p.m. to remind to take meds.    Strengths: Supportive family  Barriers: Agitation, Confused, Decreased endurance, Difficulty following instructions, Bladder incontinence, Impaired activity tolerance, Impaired carryover of learning, Impaired insight/denial of deficits, Impaired functional cognition, Impulsive,  Uncooperative    Plan    Anticipated d/c 2/23/24    Passport items to be completed:  Express basic needs, understand food/liquid recommendations, consistently follow swallow precautions, manage finances, manage medications, arrive to therapy appointments on time, complete daily memory log entries, solve problems related to safety situations, review education related to hospitalization, complete caregiver training     Speech Therapy Problems (Active)       Problem: Memory STGs       Dates: Start:  02/10/24         Goal: STG-Within one week, patient will       Dates: Start:  02/10/24       Description: 1) Individualized goal:  recall novel information related to rehabilitation using external and internal memory strategies with 80% acc and MIN A.   2) Interventions:  SLP Self Care / ADL Training , SLP Cognitive Skill Development, and SLP Group Treatment          Goal Note filed on 02/20/24 0808 by Tracy Calix MS,[facility]-SLP       Delirium, agitation - pt unable to participate in structured therapy.                 Problem: Problem Solving STGs       Dates: Start:  02/13/24         Goal: STG-Within one week, patient will complete medication management tasks with min A to achieve 80% accuracy.         Dates: Start:  02/13/24         Goal Note filed on 02/20/24 0808 by Tracy Calix MS,[facility]-SLP       Delirium, agitation - pt unable to participate in structured therapy.              Goal: STG-Within one week, patient will complete financial management tasks with min A to achieve 80% accuracy.         Dates: Start:  02/13/24         Goal Note filed on 02/20/24 0808 by Tracy Calix MS,[facility]-SLP       Delirium, agitation - pt unable to participate in structured therapy.                 Problem: Speech/Swallowing LTGs       Dates: Start:  02/10/24         Goal: LTG-By discharge, patient will safely swallow       Dates: Start:  02/10/24       Description: 1) Individualized goal:  level 7 regular textures and thin liquids with no  overt clinical s/sx of aspiration for a safe D/C to PLOF.  2) Interventions:  SLP Swallowing Dysfunction Treatment, SLP Self Care / ADL Training , and SLP Group Treatment             Goal: LTG-By discharge, patient will solve complex problem       Dates: Start:  02/10/24       Description: 1) Individualized goal:  related to health/safety with 80% acc and MOD I for a safe D/C to PLOF.   2) Interventions:  SLP Self Care / ADL Training , SLP Cognitive Skill Development, and SLP Group Treatment                Problem: Swallowing STGs       Dates: Start:  02/10/24         Goal: STG-Within one week, patient will safely swallow       Dates: Start:  02/10/24       Description: 1) Individualized goal:  trials of level 7 regular textures and thin liquids with no overt clinical s/sx of aspiration for 2/2 meals w/ SLP.  2) Interventions:  SLP Swallowing Dysfunction Treatment, SLP Self Care / ADL Training , and SLP Group Treatment          Goal Note filed on 02/20/24 0808 by Tracy Calix MS,[facility]-SLP       Pt refusing participation in dysphagia intervention.

## 2024-02-23 ENCOUNTER — APPOINTMENT (OUTPATIENT)
Dept: OCCUPATIONAL THERAPY | Facility: REHABILITATION | Age: 79
End: 2024-02-23
Attending: PHYSICAL MEDICINE & REHABILITATION
Payer: MEDICARE

## 2024-02-23 ENCOUNTER — APPOINTMENT (OUTPATIENT)
Dept: INPATIENT REHAB | Facility: REHABILITATION | Age: 79
End: 2024-02-23
Payer: MEDICARE

## 2024-02-23 VITALS
OXYGEN SATURATION: 95 % | TEMPERATURE: 98 F | DIASTOLIC BLOOD PRESSURE: 70 MMHG | BODY MASS INDEX: 22.32 KG/M2 | WEIGHT: 138.89 LBS | RESPIRATION RATE: 16 BRPM | SYSTOLIC BLOOD PRESSURE: 108 MMHG | HEART RATE: 67 BPM | HEIGHT: 66 IN

## 2024-02-23 LAB
ANION GAP SERPL CALC-SCNC: 12 MMOL/L (ref 7–16)
BUN SERPL-MCNC: 10 MG/DL (ref 8–22)
CALCIUM SERPL-MCNC: 8.3 MG/DL (ref 8.5–10.5)
CHLORIDE SERPL-SCNC: 110 MMOL/L (ref 96–112)
CO2 SERPL-SCNC: 21 MMOL/L (ref 20–33)
CREAT SERPL-MCNC: 0.53 MG/DL (ref 0.5–1.4)
ERYTHROCYTE [DISTWIDTH] IN BLOOD BY AUTOMATED COUNT: 43.8 FL (ref 35.9–50)
GFR SERPLBLD CREATININE-BSD FMLA CKD-EPI: 95 ML/MIN/1.73 M 2
GLUCOSE SERPL-MCNC: 87 MG/DL (ref 65–99)
HCT VFR BLD AUTO: 32.5 % (ref 37–47)
HGB BLD-MCNC: 10.1 G/DL (ref 12–16)
MCH RBC QN AUTO: 29.7 PG (ref 27–33)
MCHC RBC AUTO-ENTMCNC: 31.1 G/DL (ref 32.2–35.5)
MCV RBC AUTO: 95.6 FL (ref 81.4–97.8)
PLATELET # BLD AUTO: 266 K/UL (ref 164–446)
PMV BLD AUTO: 9.9 FL (ref 9–12.9)
POTASSIUM SERPL-SCNC: 3.7 MMOL/L (ref 3.6–5.5)
RBC # BLD AUTO: 3.4 M/UL (ref 4.2–5.4)
SODIUM SERPL-SCNC: 143 MMOL/L (ref 135–145)
WBC # BLD AUTO: 6.8 K/UL (ref 4.8–10.8)

## 2024-02-23 PROCEDURE — 80048 BASIC METABOLIC PNL TOTAL CA: CPT

## 2024-02-23 PROCEDURE — 700102 HCHG RX REV CODE 250 W/ 637 OVERRIDE(OP): Performed by: PHYSICAL MEDICINE & REHABILITATION

## 2024-02-23 PROCEDURE — 36415 COLL VENOUS BLD VENIPUNCTURE: CPT

## 2024-02-23 PROCEDURE — A9270 NON-COVERED ITEM OR SERVICE: HCPCS | Performed by: PHYSICAL MEDICINE & REHABILITATION

## 2024-02-23 PROCEDURE — 85027 COMPLETE CBC AUTOMATED: CPT

## 2024-02-23 PROCEDURE — 97535 SELF CARE MNGMENT TRAINING: CPT

## 2024-02-23 PROCEDURE — 94760 N-INVAS EAR/PLS OXIMETRY 1: CPT

## 2024-02-23 RX ORDER — CEFDINIR 300 MG/1
300 CAPSULE ORAL EVERY 12 HOURS
Qty: 4 CAPSULE | Refills: 0 | Status: ACTIVE | OUTPATIENT
Start: 2024-02-23 | End: 2024-02-23

## 2024-02-23 RX ORDER — CLOPIDOGREL BISULFATE 75 MG/1
75 TABLET ORAL DAILY
Qty: 30 TABLET | Refills: 0 | Status: SHIPPED | OUTPATIENT
Start: 2024-02-24

## 2024-02-23 RX ORDER — CEFDINIR 300 MG/1
300 CAPSULE ORAL EVERY 12 HOURS
Qty: 4 CAPSULE | Refills: 0 | Status: SHIPPED
Start: 2024-02-23 | End: 2024-02-23

## 2024-02-23 RX ORDER — ASPIRIN 81 MG/1
81 TABLET, CHEWABLE ORAL DAILY
Qty: 100 TABLET | Refills: 0 | Status: SHIPPED | OUTPATIENT
Start: 2024-02-24

## 2024-02-23 RX ORDER — ATORVASTATIN CALCIUM 40 MG/1
40 TABLET, FILM COATED ORAL EVERY EVENING
Qty: 30 TABLET | Refills: 0 | Status: SHIPPED | OUTPATIENT
Start: 2024-02-23

## 2024-02-23 RX ORDER — FAMOTIDINE 20 MG/1
20 TABLET, FILM COATED ORAL 2 TIMES DAILY
Qty: 60 TABLET | Refills: 0 | Status: SHIPPED | OUTPATIENT
Start: 2024-02-23

## 2024-02-23 RX ORDER — TRAZODONE HYDROCHLORIDE 50 MG/1
50 TABLET ORAL
Qty: 30 TABLET | Refills: 0 | Status: SHIPPED | OUTPATIENT
Start: 2024-02-23

## 2024-02-23 RX ORDER — ESCITALOPRAM OXALATE 10 MG/1
10 TABLET ORAL DAILY
Qty: 30 TABLET | Refills: 0 | Status: SHIPPED | OUTPATIENT
Start: 2024-02-24

## 2024-02-23 RX ORDER — METOPROLOL TARTRATE 50 MG/1
50 TABLET, FILM COATED ORAL 2 TIMES DAILY
Qty: 60 TABLET | Refills: 0 | Status: SHIPPED | OUTPATIENT
Start: 2024-02-23

## 2024-02-23 RX ORDER — ACETAMINOPHEN 325 MG/1
650 TABLET ORAL EVERY 6 HOURS PRN
Qty: 30 TABLET | Refills: 0 | Status: SHIPPED | OUTPATIENT
Start: 2024-02-23

## 2024-02-23 RX ORDER — OLANZAPINE 2.5 MG/1
TABLET, FILM COATED ORAL
Qty: 9 TABLET | Refills: 0 | Status: SHIPPED | OUTPATIENT
Start: 2024-02-23 | End: 2024-02-29

## 2024-02-23 RX ORDER — LANOLIN ALCOHOL/MO/W.PET/CERES
100 CREAM (GRAM) TOPICAL DAILY
Qty: 30 TABLET | Refills: 0 | Status: SHIPPED | OUTPATIENT
Start: 2024-02-24

## 2024-02-23 RX ORDER — CEFDINIR 300 MG/1
300 CAPSULE ORAL EVERY 12 HOURS
Qty: 4 CAPSULE | Refills: 0 | Status: ACTIVE | OUTPATIENT
Start: 2024-02-23 | End: 2024-02-24

## 2024-02-23 RX ADMIN — CEFDINIR 300 MG: 300 CAPSULE ORAL at 08:34

## 2024-02-23 RX ADMIN — OLANZAPINE 2.5 MG: 2.5 TABLET, FILM COATED ORAL at 12:02

## 2024-02-23 RX ADMIN — Medication 100 MG: at 08:34

## 2024-02-23 RX ADMIN — CLOPIDOGREL BISULFATE 75 MG: 75 TABLET ORAL at 08:34

## 2024-02-23 RX ADMIN — ESCITALOPRAM OXALATE 10 MG: 10 TABLET ORAL at 08:34

## 2024-02-23 RX ADMIN — OLANZAPINE 2.5 MG: 2.5 TABLET, FILM COATED ORAL at 05:15

## 2024-02-23 RX ADMIN — METOPROLOL TARTRATE 50 MG: 25 TABLET, FILM COATED ORAL at 05:16

## 2024-02-23 RX ADMIN — FAMOTIDINE 20 MG: 20 TABLET ORAL at 08:34

## 2024-02-23 RX ADMIN — ASPIRIN 81 MG 81 MG: 81 TABLET ORAL at 08:34

## 2024-02-23 ASSESSMENT — BRIEF INTERVIEW FOR MENTAL STATUS (BIMS)
WHAT MONTH IS IT: ACCURATE WITHIN 5 DAYS
WHAT YEAR IS IT: CORRECT
WHAT DAY OF THE WEEK IS IT: CORRECT
ASKED TO RECALL SOCK: YES, NO CUE REQUIRED
BIMS SUMMARY SCORE: 15
ASKED TO RECALL BED: YES, NO CUE REQUIRED
ASKED TO RECALL BLUE: YES, NO CUE REQUIRED
INITIAL REPETITION OF BED BLUE SOCK - FIRST ATTEMPT: 3

## 2024-02-23 ASSESSMENT — PATIENT HEALTH QUESTIONNAIRE - PHQ9
8. MOVING OR SPEAKING SO SLOWLY THAT OTHER PEOPLE COULD HAVE NOTICED. OR THE OPPOSITE, BEING SO FIGETY OR RESTLESS THAT YOU HAVE BEEN MOVING AROUND A LOT MORE THAN USUAL: NOT AT ALL
4. FEELING TIRED OR HAVING LITTLE ENERGY: NOT AT ALL
6. FEELING BAD ABOUT YOURSELF - OR THAT YOU ARE A FAILURE OR HAVE LET YOURSELF OR YOUR FAMILY DOWN: NOT AL ALL
3. TROUBLE FALLING OR STAYING ASLEEP OR SLEEPING TOO MUCH: NOT AT ALL
5. POOR APPETITE OR OVEREATING: NOT AT ALL
2. FEELING DOWN, DEPRESSED, IRRITABLE, OR HOPELESS: NOT AT ALL
9. THOUGHTS THAT YOU WOULD BE BETTER OFF DEAD, OR OF HURTING YOURSELF: NOT AT ALL
7. TROUBLE CONCENTRATING ON THINGS, SUCH AS READING THE NEWSPAPER OR WATCHING TELEVISION: NOT AT ALL
SUM OF ALL RESPONSES TO PHQ QUESTIONS 1-9: 1
1. LITTLE INTEREST OR PLEASURE IN DOING THINGS: SEVERAL DAYS
SUM OF ALL RESPONSES TO PHQ9 QUESTIONS 1 AND 2: 1

## 2024-02-23 NOTE — PROGRESS NOTES
NURSING DAILY NOTE    Name: Lorraine Leung   Date of Admission: 2/9/2024   Admitting Diagnosis: Acute ischemic left MCA stroke (HCC)  Attending Physician: Carlin Powers M.d.  Allergies: Patient has no known allergies.    Safety  Patient Assist  Min A  Patient Precautions  Fall Risk  Precaution Comments  [place], pt wears headphones amplifier, O2=2L no wean  Bed Transfer Status  Supervised  Toilet Transfer Status   Contact Guard Assist (FWW level)  Assistive Devices  Wheelchair, Rails  Oxygen  Nasal Cannula  Diet/Therapeutic Dining  Current Diet Order   Procedures    Diet Order Diet: Regular     Pill Administration  whole  Agitated Behavioral Scale  14  ABS Level of Severity  No Agitation    Fall Risk  Has the patient had a fall this admission?   No  Tiffani Hinton Fall Risk Scoring  17, HIGH RISK  Fall Risk Safety Measures  bed alarm, chair alarm, poor balance, and low vision/ hearing    Vitals  Temperature: 37.1 °C (98.7 °F)  Temp src: Oral  Pulse: 86  Respiration: 18  Blood Pressure : 108/70  Blood Pressure MAP (Calculated): 83 MM HG  BP Location: Right, Upper Arm  Patient BP Position: Supine     Oxygen  Pulse Oximetry: 95 %  O2 (LPM): 2  O2 Delivery Device: Nasal Cannula    Bowel and Bladder  Last Bowel Movement  02/22/24  Stool Type  Type 5: Soft blob with clear cut edges (passed easily)  Bowel Device  Bathroom  Continent  Bladder: Did not void   Bowel: No movement  Bladder Function  Urine Void (mL):  (large)  Number of Times Voided: 1  Urinary Options: Yes  Urine Color: Yellow  Urine Clarity: Clear  Urine Odor: Malodorous  Number of Times Incontinent of Urine: 1  Straight Catheter: 425 ml  Wet Diaper Count: 1  Genitourinary Assessment   Bladder Assessment (WDL):  WDL Except  Villela Catheter: Not Applicable  Urinary Elimination: Incontinence  Urine Color: Yellow  Urine Clarity: Clear  Urine Odor: Malodorous  Number of Bladder Accidents: 1  Total Number of  Bladder of Accidents in Last 7 Days: 1  Number of Times Incontinent of Urine: 1  Bladder Device: Bathroom  Time Void: Yes  Bladder Scan: Post Void  $ Bladder Scan Results (mL): 70    Skin  Selwyn Score   15  Sensory Interventions   Bed Types: Standard/Trauma Mattress  Skin Preventative Measures: Pillows in Use for Support / Positioning  Moisture Interventions  Moisturizers/Barriers: Barrier Paste, Barrier Wipes      Pain  Pain Rating Scale  0 - No Pain  Pain Location  Head, Neck  Pain Location Orientation  Posterior  Pain Interventions   Declines    ADLs    Bathing   Shower, * * With Assistance from, Staff  Linen Change   Partial  Personal Hygiene  Perineal Care, Moist Octavia Wipes, Change Octavia Pads  Chlorhexidine Bath      Oral Care  Brushed Teeth  Teeth/Dentures     Shave     Nutrition Percentage Eaten  *  * Meal *  *, Dinner, Between % Consumed  Environmental Precautions  Treaded Slipper Socks on Patient, Personal Belongings, Wastebasket, Call Bell etc. in Easy Reach, Transferred to Stronger Side, Report Given to Other Health Care Providers Regarding Fall Risk, Bed in Low Position  Patient Turns/Positioning  Patient Turns Self from Side to Side  Patient Turns Assistance/Tolerance  Assistance of One  Bed Positions  Bed Controls On, Bed Locked  Head of Bed Elevated  Self regulated      Psychosocial/Neurologic Assessment  Psychosocial Assessment  Psychosocial (WDL):  WDL Except  Patient Behaviors: Fatigue  Family Behaviors: Family Present  Neurologic Assessment  Neuro (WDL): Exceptions to WDL  Level of Consciousness: Alert  Orientation Level: Oriented X4  Cognition: Appropriate judgement, Appropriate attention/concentration, Follows commands  Speech: Clear  Facial Symmetry:  (wdl)  Pupil Assesment: No  R Pupil Size (mm): 3  R Pupil Shape / Description: Round  R Pupil Reaction: Brisk  L Pupil Size (mm): 3  L Pupil Shape / Description: Round  L Pupil Reaction: Brisk  RUE Motor Response: Responds to commands  RUE  Sensation: Numbness, Tingling  Muscle Strength Right Arm: Good Strength Against Gravity and Moderate Resistance  Muscle Strength Left Arm: Normal Strength Against Gravity and Full Resistance  RLE Motor Response: Responds to commands  RLE Sensation: Numbness, Tingling  Muscle Strength Right Leg: Good Strength Against Gravity and Moderate Resistance  Muscle Strength Left Leg: Normal Strength Against Gravity and Full Resistance  EENT (WDL):  WDL Except    Cardio/Pulmonary Assessment  Edema      Respiratory Breath Sounds  RUL Breath Sounds: Diminished  RML Breath Sounds: Diminished  RLL Breath Sounds: Diminished  KASEY Breath Sounds: Diminished  LLL Breath Sounds: Diminished  Cardiac Assessment   Cardiac (WDL):  WDL Except

## 2024-02-23 NOTE — PROGRESS NOTES
Patient discharged to assisted living per order.  Discharge instructions reviewed with patient and daughter; they verbalize understanding and signed copies placed in chart.  Patient has all belongings; signed copy of form in chart.  Patient left facility at 1305 via [facility] accompanied by rehab staff and daughter.  Patient was mildly agitated before discharge. MD and CN made aware. PRN zyprexa given.   Prescriptions given to daughter.

## 2024-02-23 NOTE — CARE PLAN
Problem: Speech/Swallowing LTGs  Goal: LTG-By discharge, patient will safely swallow  Description: 1) Individualized goal:  level 7 regular textures and thin liquids with no overt clinical s/sx of aspiration for a safe D/C to PLOF.  2) Interventions:  SLP Swallowing Dysfunction Treatment, SLP Self Care / ADL Training , and SLP Group Treatment      Outcome: Met     Problem: Swallowing STGs  Goal: STG-Within one week, patient will safely swallow  Description: 1) Individualized goal:  trials of level 7 regular textures and thin liquids with no overt clinical s/sx of aspiration for 2/2 meals w/ SLP.  2) Interventions:  SLP Swallowing Dysfunction Treatment, SLP Self Care / ADL Training , and SLP Group Treatment      Outcome: Met     Problem: Speech/Swallowing LTGs  Goal: LTG-By discharge, patient will solve complex problem  Description: 1) Individualized goal:  related to health/safety with 80% acc and MOD I for a safe D/C to PLOF.   2) Interventions:  SLP Self Care / ADL Training , SLP Cognitive Skill Development, and SLP Group Treatment      Outcome: Discharged - Not Met     Problem: Memory STGs  Goal: STG-Within one week, patient will  Description: 1) Individualized goal:  recall novel information related to rehabilitation using external and internal memory strategies with 80% acc and MIN A.   2) Interventions:  SLP Self Care / ADL Training , SLP Cognitive Skill Development, and SLP Group Treatment      Outcome: Discharged - Not Met     Problem: Problem Solving STGs  Goal: STG-Within one week, patient will complete medication management tasks with min A to achieve 80% accuracy.    Outcome: Discharged - Not Met  Goal: STG-Within one week, patient will complete financial management tasks with min A to achieve 80% accuracy.    Outcome: Discharged - Not Met

## 2024-02-23 NOTE — PROGRESS NOTES
NURSING DAILY NOTE    Name: Lorraine Leung   Date of Admission: 2/9/2024   Admitting Diagnosis: Acute ischemic left MCA stroke (HCC)  Attending Physician: Carlin Powers M.d.  Allergies: Patient has no known allergies.    Safety  Patient Assist  cga  Patient Precautions  Fall Risk  Precaution Comments  [place], pt wears headphones amplifier, O2=2L no wean  Bed Transfer Status  Supervised  Toilet Transfer Status   Contact Guard Assist (FWW level)  Assistive Devices  Wheelchair, Rails  Oxygen  Silicone Nasal Cannula  Diet/Therapeutic Dining  Current Diet Order   Procedures    Diet Order Diet: Regular     Pill Administration  whole and thin  Agitated Behavioral Scale  14  ABS Level of Severity  No Agitation    Fall Risk  Has the patient had a fall this admission?   No  Tiffani Hinton Fall Risk Scoring  17, HIGH RISK  Fall Risk Safety Measures  bed alarm and low vision/ hearing    Vitals  Temperature: 37.1 °C (98.7 °F)  Temp src: Oral  Pulse: (!) 101  Respiration: 18  Blood Pressure : 117/84  Blood Pressure MAP (Calculated): 95 MM HG  BP Location: Right, Upper Arm  Patient BP Position: Sitting     Oxygen  Pulse Oximetry: 93 %  O2 (LPM): 2  O2 Delivery Device: Silicone Nasal Cannula    Bowel and Bladder  Last Bowel Movement  02/21/24  Stool Type  Type 5: Soft blob with clear cut edges (passed easily)  Bowel Device  Bathroom  Continent  Bladder: Did not void   Bowel: No movement  Bladder Function  Urine Void (mL):  (large)  Number of Times Voided: 1  Urinary Options: Yes  Urine Color: Yellow  Urine Clarity: Clear  Urine Odor: Malodorous  Number of Times Incontinent of Urine: 1  Straight Catheter: 425 ml  Wet Diaper Count: 1  Genitourinary Assessment   Bladder Assessment (WDL):  WDL Except  [place]la Catheter: Not Applicable  Urinary Elimination: Incontinence  Urine Color: Yellow  Urine Clarity: Clear  Urine Odor: Malodorous  Number of Bladder Accidents: 1  Total Number of  Bladder of Accidents in Last 7 Days: 1  Number of Times Incontinent of Urine: 1  Bladder Device: Bathroom  Time Void: Yes  Bladder Scan: Post Void  $ Bladder Scan Results (mL): 70    Skin  Selwyn Score   15  Sensory Interventions   Bed Types: Standard/Trauma Mattress  Skin Preventative Measures: Pillows in Use for Support / Positioning, Silicone Oxygen Tubing in Use  Moisture Interventions  Moisturizers/Barriers: Barrier Paste, Barrier Wipes      Pain  Pain Rating Scale  4 - Distracts me, can do usual activities  Pain Location  Head, Neck  Pain Location Orientation  Posterior  Pain Interventions   Medication (see MAR)    ADLs    Bathing   Shower, * * With Assistance from, Staff  Linen Change   Partial  Personal Hygiene  Perineal Care, Moist Octavia Wipes, Change Octavia Pads  Chlorhexidine Bath      Oral Care  Brushed Teeth  Teeth/Dentures     Shave     Nutrition Percentage Eaten  Between 50-75% Consumed, Lunch  Environmental Precautions  Treaded Slipper Socks on Patient, Personal Belongings, Wastebasket, [place] etc. in Easy Reach, Transferred to Stronger Side, Report Given to Other Health Care Providers Regarding Fall Risk, Bed in Low Position  Patient Turns/Positioning  Patient Turns Self from Side to Side  Patient Turns Assistance/Tolerance  Assistance of One  Bed Positions  Bed Controls On, Bed Locked  Head of Bed Elevated  Self regulated      Psychosocial/Neurologic Assessment  Psychosocial Assessment  Psychosocial (WDL):  WDL Except  Patient Behaviors: Fatigue  Family Behaviors: Family Present  Neurologic Assessment  Neuro (WDL): Exceptions to WDL  Level of Consciousness: Alert  Orientation Level: Oriented X4  Cognition: Appropriate judgement, Appropriate attention/concentration, Follows commands  Speech: Clear  Facial Symmetry:  (wdl)  Pupil Assesment: No  R Pupil Size (mm): 3  R Pupil Shape / Description: Round  R Pupil Reaction: Brisk  L Pupil Size (mm): 3  L Pupil Shape / Description: Round  L Pupil Reaction:  Brisk  RUE Motor Response: Responds to commands  RUE Sensation: Numbness, Tingling  Muscle Strength Right Arm: Good Strength Against Gravity and Moderate Resistance  Muscle Strength Left Arm: Normal Strength Against Gravity and Full Resistance  RLE Motor Response: Responds to commands  RLE Sensation: Numbness, Tingling  Muscle Strength Right Leg: Good Strength Against Gravity and Moderate Resistance  Muscle Strength Left Leg: Normal Strength Against Gravity and Full Resistance  EENT (WDL):  WDL Except    Cardio/Pulmonary Assessment  Edema      Respiratory Breath Sounds  RUL Breath Sounds: Diminished  RML Breath Sounds: Diminished  RLL Breath Sounds: Diminished  KASEY Breath Sounds: Diminished  LLL Breath Sounds: Diminished  Cardiac Assessment   Cardiac (WDL):  WDL Except

## 2024-02-23 NOTE — CARE PLAN
"  Problem: Bladder / Voiding  Goal: Patient will establish and maintain regular urinary output  Outcome: Progressing  Note: OOB ,to bathroom with assist , voiding freely.      Problem: Pain - Standard  Goal: Alleviation of pain or a reduction in pain to the patient’s comfort goal  Outcome: Progressing  Note: Assist for pain and discomfort , needs anticipated and attended. Pain under control.     Problem: Fall Risk - Rehab  Goal: Patient will remain free from falls  Outcome: Progressing  Note: Tiffani Hinton Fall risk Assessment Score: 17    High fall risk Interventions     - Bed and strip alarm   - Yellow sign by the door   - Yellow wrist band \"Fall risk\"  - Room near to the nurse station  - Do not leave patient unattended in the bathroom  - Fall risk education provided      The patient is Stable - Low risk of patient condition declining or worsening    Shift Goals  Clinical Goals: safety, no behaviors  Patient Goals: rest    Progress made toward(s) clinical / shift goals:  Pt free from fall and injury.    "

## 2024-02-23 NOTE — FLOWSHEET NOTE
02/23/24 0638   Events/Summary/Plan   Events/Summary/Plan 02 pulse ox check   Vital Signs   Pulse 67   Respiration 16   Pulse Oximetry 95 %   $ Pulse Oximetry (Spot Check) Yes   Respiratory Assessment   Level of Consciousness Alert   Chest Exam   Work Of Breathing / Effort Within Normal Limits   Breath Sounds   RUL Breath Sounds Diminished   RML Breath Sounds Diminished   RLL Breath Sounds Diminished   KASEY Breath Sounds Diminished   LLL Breath Sounds Diminished   Secretions   Cough Non Productive   Oxygen   O2 (LPM) 2   O2 Delivery Device Silicone Nasal Cannula

## 2024-02-23 NOTE — DISCHARGE PLANNING
Case management  Reviewed signed copy of IMM and answered all questions.    Dc date /disposition: 2/23/24 YON with home health.

## 2024-02-23 NOTE — DISCHARGE INSTRUCTIONS
[facility] NURSING DISCHARGE INSTRUCTIONS    Blood Pressure : 108/70  Weight: 63 kg (138 lb 14.2 oz)  Nursing recommendations for Lorraine Leung at time of discharge are as follows:  Client verbalized understanding of all discharge instructions and prescriptions.     Review all your home medications and newly ordered medications with your doctor and/or pharmacist. Follow medication instructions as directed by your doctor and/or pharmacist.    Pain Management:   Discharge Pain Medication Instructions:  Comfort Goal: Comfort with Movement, Perform Activity  Notify your primary care provider if pain is unrelieved with these measures, if the pain is new, or increased in intensity.    Discharge Skin Characteristics:    Discharge Skin Exam:    Wound 02/09/24 Sacrum Midline Buttocks MASD (Active)   Wound Image   02/21/24 1300   Site Assessment JACEK 02/21/24 1900   Periwound Assessment Pink;Excoriated;Blanchable erythema 02/21/24 1300   Margins Defined edges 02/20/24 2010   Closure Open to air 02/21/24 1300   Drainage Amount None 02/21/24 1300   Treatments Cleansed;Site care 02/21/24 1300   Offloading/DME Other (comment) 02/21/24 1300   Wound Cleansing Foam Cleanser/Washcloth 02/21/24 1300   Periwound Protectant Barrier Paste 02/21/24 1300   Dressing Status Open to Air 02/21/24 1300   NEXT Weekly Photo (Inpatient Only) 02/26/24 02/21/24 1300   Wound Team Following Weekly 02/21/24 1300   Wound Length (cm) 2.3 cm 02/13/24 1100   Wound Width (cm) 1.2 cm 02/13/24 1100   Wound Surface Area (cm^2) 2.76 cm^2 02/13/24 1100   Tunneling (cm) 0 cm 02/13/24 1100   Undermining (cm) 0 cm 02/13/24 1100   Shape irregular 02/21/24 1300   Wound Odor None 02/21/24 1300   WOUND NURSE ONLY - Time Spent with Patient (mins) 30 02/21/24 1300     Skin / Wound Care Instructions: Please contact your primary care physician for any change in skin integrity. Increase in redness, excoriation    f You Have Surgical Incisions /  Wounds:  Monitor surgical site(s) for signs of increased swelling, redness or symptoms of drainage from the site or fever as this could indicate signs and symptoms of infection. If these symptoms are noted, notifiy your primary care provider.      Discharge Safety Instructions:       Discharge Safety Concerns:    The interdisciplinary team has made recommendation that you should not be left alone  in the house due to balance problem and weakness  Anti-embolic stockings are not required to increase circulation to the lower extremities.    Discharge Diet:  Regular      Discharge Liquids:  Thin  Discharge Bowel Function:  Continent  Please contact your primary care physician for any changes in bowel habits.  Discharge Bowel Program:  continent  Discharge Bladder Function:  continent  Discharge Urinary Devices:        Nursing Discharge Plan:   Influenza Vaccine Indication: Not indicated: Previously immunized this influenza season and > 8 years of age    Case Management Discharge Instructions:   Discharge Location:    Agency Name/Address/Phone:    Home Health:    Outpatient Services:    DME Provider/Phone:    Medical Equipment Ordered:    Prescription Faxed to:      Discharge Medication Instructions:  Below are the medications your physician expects you to take upon discharge:    Urinary Tract Infection, Adult  A urinary tract infection (UTI) is an infection of any part of the urinary tract. The urinary tract includes:  The kidneys.  The ureters.  The bladder.  The urethra.  These organs make, store, and get rid of pee (urine) in the body.  What are the causes?  This infection is caused by germs (bacteria) in your genital area. These germs grow and cause swelling (inflammation) of your urinary tract.  What increases the risk?  The following factors may make you more likely to develop this condition:  Using a small, thin tube (catheter) to drain pee.  Not being able to control when you pee or poop (incontinence).  Being  female. If you are female, these things can increase the risk:  Using these methods to prevent pregnancy:  A medicine that kills sperm (spermicide).  A device that blocks sperm (diaphragm).  Having low levels of a female hormone (estrogen).  Being pregnant.  You are more likely to develop this condition if:  You have genes that add to your risk.  You are sexually active.  You take antibiotic medicines.  You have trouble peeing because of:  A prostate that is bigger than normal, if you are male.  A blockage in the part of your body that drains pee from the bladder.  A kidney stone.  A nerve condition that affects your bladder.  Not getting enough to drink.  Not peeing often enough.  You have other conditions, such as:  Diabetes.  A weak disease-fighting system (immune system).  Sickle cell disease.  Gout.  Injury of the spine.  What are the signs or symptoms?  Symptoms of this condition include:  Needing to pee right away.  Peeing small amounts often.  Pain or burning when peeing.  Blood in the pee.  Pee that smells bad or not like normal.  Trouble peeing.  Pee that is cloudy.  Fluid coming from the vagina, if you are female.  Pain in the belly or lower back.  Other symptoms include:  Vomiting.  Not feeling hungry.  Feeling mixed up (confused). This may be the first symptom in older adults.  Being tired and grouchy (irritable).  A fever.  Watery poop (diarrhea).  How is this treated?  Taking antibiotic medicine.  Taking other medicines.  Drinking enough water.  In some cases, you may need to see a specialist.  Follow these instructions at home:    Medicines  Take over-the-counter and prescription medicines only as told by your doctor.  If you were prescribed an antibiotic medicine, take it as told by your doctor. Do not stop taking it even if you start to feel better.  General instructions  Make sure you:  Pee until your bladder is empty.  Do not hold pee for a long time.  Empty your bladder after sex.  Wipe from  front to back after peeing or pooping if you are a female. Use each tissue one time when you wipe.  Drink enough fluid to keep your pee pale yellow.  Keep all follow-up visits.  Contact a doctor if:  You do not get better after 1-2 days.  Your symptoms go away and then come back.  Get help right away if:  You have very bad back pain.  You have very bad pain in your lower belly.  You have a fever.  You have chills.  You feeling like you will vomit or you vomit.  Summary  A urinary tract infection (UTI) is an infection of any part of the urinary tract.  This condition is caused by germs in your genital area.  There are many risk factors for a UTI.  Treatment includes antibiotic medicines.  Drink enough fluid to keep your pee pale yellow.  This information is not intended to replace advice given to you by your health care provider. Make sure you discuss any questions you have with your health care provider.  Document Revised: 07/30/2021 Document Reviewed: 07/30/2021  [employer] Patient Education © 2023 [employer]   Pressure Injury    A pressure injury is damage to the skin and underlying tissue that results from pressure being applied to an area of the body. It often affects people who must spend a long time in a bed or chair because of a medical condition.  Pressure injuries usually occur:  Over bony parts of the body, such as the tailbone, shoulders, elbows, hips, heels, spine, ankles, and back of the head.  Under medical devices that make contact with the body, such as respiratory equipment, stockings, tubes, and splints.  Pressure injuries start as reddened areas on the skin and can lead to pain and an open wound.  What are the causes?  This condition is caused by frequent or constant pressure to an area of the body. Decreased blood flow to the skin can eventually cause the skin tissue to die and break down, causing a wound.  What increases the risk?  You are more likely to develop this condition if you:  Are in  the hospital or an extended care facility.  Are bedridden or in a wheelchair.  Have an injury or disease that keeps you from:  Moving normally.  Feeling pain or pressure.  Have a condition that:  Makes you sleepy or less alert.  Causes poor blood flow.  Need to wear a medical device.  Have poor control of your bladder or bowel functions (incontinence).  Have poor nutrition (malnutrition).  If you are at risk for pressure injuries, your health care provider may recommend certain types of mattresses, mattress covers, pillows, cushions, or boots to help prevent them. These may include products filled with air, foam, gel, or sand.  What are the signs or symptoms?  Symptoms of this condition depend on the severity of the injury. Symptoms may include:  Red or dark areas of the skin.  Pain, warmth, or a change of skin texture.  Blisters.  An open wound.  How is this diagnosed?  This condition is diagnosed with a medical history and physical exam. You may also have tests, such as:  Blood tests.  Imaging tests.  Blood flow tests.  Your pressure injury will be staged based on its severity. Staging is based on:  The depth of the tissue injury, including whether there is exposure of muscle, bone, or tendon.  The cause of the pressure injury.  How is this treated?  This condition may be treated by:  Relieving or redistributing pressure on your skin. This includes:  Frequently changing your position.  Avoiding positions that caused the wound or that can make the wound worse.  Using specific bed mattresses, chair cushions, or protective boots.  Moving medical devices from an area of pressure, or placing padding between the skin and the device.  Using foams, creams, or powders to prevent rubbing (friction) on the skin.  Keeping your skin clean and dry. This may include using a skin cleanser or skin barrier as told by your health care provider.  Cleaning your injury and removing any dead tissue from the wound (debridement).  Placing  a bandage (dressing) over your injury.  Using medicines for pain or to prevent or treat infection.  Surgery may be needed if other treatments are not working or if your injury is very deep.  Follow these instructions at home:  Wound care  Follow instructions from your health care provider about how to take care of your wound. Make sure you:  Wash your hands with soap and water before and after you change your bandage (dressing). If soap and water are not available, use hand .  Change your dressing as told by your health care provider.   Check your wound every day for signs of infection. Have a caregiver do this for you if you are not able. Check for:  Redness, swelling, or increased pain.  More fluid or blood.  Warmth.  Pus or a bad smell.  Skin care  Keep your skin clean and dry. Gently pat your skin dry.  Do not rub or massage your skin.  You or a caregiver should check your skin every day for any changes in color or any new blisters or sores (ulcers).  Medicines  Take over-the-counter and prescription medicines only as told by your health care provider.  If you were prescribed an antibiotic medicine, take or apply it as told by your health care provider. Do not stop using the antibiotic even if your condition improves.  Reducing and redistributing pressure  Do not lie or sit in one position for a long time. Move or change position every 1-2 hours, or as told by your health care provider.  Use pillows or cushions to reduce pressure. Ask your health care provider to recommend cushions or pads for you.  General instructions    Eat a healthy diet that includes lots of protein.  Drink enough fluid to keep your urine pale yellow.  Be as active as you can every day. Ask your health care provider to suggest safe exercises or activities.  Do not abuse drugs or alcohol.  Do not use any products that contain nicotine or tobacco, such as cigarettes, e-cigarettes, and chewing tobacco. If you need help quitting, ask  "your health care provider.  Keep all follow-up visits as told by your health care provider. This is important.  Contact a health care provider if:  You have:  A fever or chills.  Pain that is not helped by medicine.  Any changes in skin color.  New blisters or sores.  Pus or a bad smell coming from your wound.  Redness, swelling, or pain around your wound.  More fluid or blood coming from your wound.  Your wound does not improve after 1-2 weeks of treatment.  Summary  A pressure injury is damage to the skin and underlying tissue that results from pressure being applied to an area of the body.  Do not lie or sit in one position for a long time. Your health care provider may advise you to move or change position every 1-2 hours.  Follow instructions from your health care provider about how to take care of your wound.  Keep all follow-up visits as told by your health care provider. This is important.  This information is not intended to replace advice given to you by your health care provider. Make sure you discuss any questions you have with your health care provider.  Document Revised: 10/13/2022 Document Reviewed: 10/13/2022  [employer] Patient Education © 2023 [employer]  Prevent Falls in Your Home    \"Falling once doubles your chance of falling again\"        -Center for Disease Control and Prevention    Falls in the home can lead to serious injury (fractures, brain injuries), hospitalizations, increased medical costs, and could even be fatal.  The good news is, there are many precautions you can take to avoid falls in your home and help keep you safe:     If prescribed an assistive device (walker, crutches), use as instructed by the healthcare provider\"   Remove any tripping hazards from your home, including loose cords, throw rugs and clutter  Keep a nightlight on in dark (hallways, bathrooms, etc)   Get up slowly, to make sure you feel okay before getting up  Be aware of any side effects of your medications: " some medications may make you dizzy  Place a non-skid rubber mat in your shower or tub-consider a shower bench or chair if unsteady on your feet  Wear supportive shoes or non-skid socks when moving around  Start an exercise program once approved by your provider.  If you are feeling weak following a hospital stay, talk to your doctor about home health or outpatient therapy programs designed to help rebuild your strength and endurance  Depression / Suicide Risk    As you are discharged from this [facility] facility, it is important to learn how to keep safe from harming yourself.    Recognize the warning signs:  Abrupt changes in personality, positive or negative- including increase in energy   Giving away possessions  Change in eating patterns- significant weight changes-  positive or negative  Change in sleeping patterns- unable to sleep or sleeping all the time   Unwillingness or inability to communicate  Depression  Unusual sadness, discouragement and loneliness  Talk of wanting to die  Neglect of personal appearance   Rebelliousness- reckless behavior  Withdrawal from people/activities they love  Confusion- inability to concentrate     If you or a loved one observes any of these behaviors or has concerns about self-harm, here's what you can do:  Talk about it- your feelings and reasons for harming yourself  Remove any means that you might use to hurt yourself (examples: pills, rope, extension cords, firearm)  Get professional help from the community (Mental Health, Substance Abuse, psychological counseling)  Do not be alone:Call your Safe Contact- someone whom you trust who will be there for you.  Call your local CRISIS HOTLINE 850-6745 or 179-341-9616  Call your local Children's Mobile Crisis Response Team [place] (511) 591-7373 or www.[employer]  Call the toll free National Suicide Prevention Hotlines   National Suicide Prevention Lifeline 735-108-MMFA (8628)  [facility]  800-SUICIDE (432-2626)        Speech Therapy Discharge Instructions for Lorraine Leung    2/23/2024    Diet: Regular (7), Thin (0)  Swallow Strategies: alternate solids and liquids, extra time at meals, frequent breaks to aid in transport of food/liquid through esophagus  Other Diet Instructions: GI referral at d/c to further assess esophageal dysphagia.  Supervision: intermittent supervision at [facility], assistance with IADLs  Cognition / Communication: Lorraine, I am so glad you are feeling better and back on the road to recovery! Please keep up the great work at home.  Lorraine would benefit from assistance managing their medications.  It is recommended that you purchase a pill organizer to sort medications in on a weekly basis.  Implementing a system to help remind you to take your medications will also be helpful (Ex: setting alarms, having a friend/family member call as a reminder).       It is recommended that Lorraine has assistance when managing any financial tasks.      To increase your ability to pay attention and concentrate it is recommended you follow these strategies:     Monitor your fatigue: Monitoring our fatigue is probably one of the most important strategies we can use. Many people suffer from higher levels of fatigue than normal and must be aware that fatigue will have a major impact on attention capabilities. This means scheduling activities that require your attention at times when you feel at your best.  Make a schedule: Choose a time that’s quiet and unhurried -- maybe at night before you go to bed -- and plan out the next day, down to the task. Use a reminder eugenio, timer, or alarm to help you stick to that schedule. Alternate things you want to do with ones you don’t to help your mind stay engaged.  Allow plenty of time to complete tasks.  Be realistic about time: Your brain is wired differently than other people’s, and it may take you longer to get things done. That’s OK. Figure out a realistic time  frame for your daily tasks -- and don’t forget to build in time for breaks if you think you’ll need them.  Quiet your mind by quieting your space: When it’s time to buckle down and get something done, take away the distractions. Use noise-canceling headphones to drown out sounds. Put your phone on silent. Work in a room with a door you can close. If you can do your job from home, set up the space in a way that helps you focus.  Control clutter: Another way to quiet your brain is to clear your space of things you don’t need. It can prevent distractions, and it can help you stay organized because you’ll have fewer things to tidy up.  Get some organizational helpers like under-the-bed containers or over-the-door holders. Ask a friend to help if it seems like you’re swimming in a sea of debris and you don’t know where to start.    What is memory?   Memory is the ability to learn, store, and retrieve information. New or increasing problems with any or all of these 3 stages of memory often occur after a traumatic brain injury, stroke, brain tumor, multiple sclerosis, or other kind of injury or illness that affects your nervous system.   Some kinds of memory problems may also occur as part of normal aging, when many people have more trouble retrieving new information.     Types of Memory:    Long-term (remote): memory for old, well-learned information that has been “rehearsed” (used) over time, such as the name of a childhood pet, memories of past vacations, or where you went to high school. Long-term memory tends to be retained after injury or illness.   Short-term (recent): memory for new experiences that took place a few minutes, hours, or days ago, such as what you had for breakfast or what you did yesterday. Short-term memory tends to be the most severely affected after injury. People who have had brain injuries may have problems with their attention span, how much memory they can store, how quickly they can think,  and how efficiently they learn. These memory problems will make it hard to understand and save short-term memories so that they can be correctly rehearsed and stored in long-term memory.   Immediate (working): memory for information that is current, that you usually keep track of mentally, such as a phone number you look up, directions someone just gave you, or keeping track of numbers in your head when you add or subtract.   Prospective: the ability to remember to do something in the future, such as remembering to take a medicine, go to an appointment, or follow through on an assignment or project.       Strategies to Help Improve Your Memory   Your speech therapist can work with you to develop strategies to help you remember new information. There are 2 main types of strategies to help your memory: internal reminders and external reminders.     Internal Reminders     Rehearsal: retelling yourself information you just learned, or restating it out loud in your own words.   Repetition: saying the same information over and over, either silently or out loud.   Clarification: asking someone else to repeat or rephrase information.   Chunking: grouping items together to reduce the number of items to remember, such as grouping 7-digit phone numbers into 2 chunks, one with 3 numbers and the other with 4 numbers.   Rhyming: making a rhyme out of important information.   Acronyms or alphabet cueing: creating a letter for each word you want to remember, or vice versa. One example is using the sentence “Every Good Boy Does Fine” to remember that the lines of a treble staff in music are the notes E, G, B, D, and F.   Imagery (also called visualization): creating pictures of the information in your mind.   Association: linking old information or habits with the new, such as remembering to take your medicine every night at the same time that you brush your teeth.   Personal meaning: making the new information meaningful or  emotionally important to you in some way.     External Reminders     Using a paper or electronic calendar or day planner.   Setting timers or alarms to remind you to do something.   Writing down reminders such as to-do lists, shopping lists, and project outlines.   Recording new information with a voice recorder.   Using a medicine organizing tool.   Creating specific, permanent places for important items. One example is always putting your keys, wallet, and cell phone in the same place every time you get home.

## 2024-02-23 NOTE — DISCHARGE SUMMARY
Physical Medicine & Rehabilitation Discharge Summary    Admission Date: 2/9/2024    Discharge Date: 2/23/2024    Attending Provider: Carlin Powers MD    Admission Diagnosis:   Active Hospital Problems    Diagnosis     *Acute ischemic left MCA stroke (HCC)     Anxiety     Factor V Leiden (HCC)     COPD (chronic obstructive pulmonary disease) (HCC)     CAD (coronary artery disease)     AMS (altered mental status)     Hypokalemia     Hypertension        Discharge Diagnosis:  Active Hospital Problems    Diagnosis     *Acute ischemic left MCA stroke (HCC)     Anxiety     Factor V Leiden (HCC)     COPD (chronic obstructive pulmonary disease) (HCC)     CAD (coronary artery disease)     AMS (altered mental status)     Hypokalemia     Hypertension        HPI per Admission History & Physical:  77 y/o with factor V, CAD, vertigo, anxiety, hypoxia on 3l baseline found down on 1/6. MRI showed large Left MCA stroke. No TPA or thrombectomy was administered. Patient went to SNF and is now admitted to rehabilitaiton.     Patient was admitted to [facility] on 2/9/2024.     Hospital Course by Problem List:  CVA:  - Patient with L MCA stroke on 1/6. Etiology likely Unclear . Received NO intervention  - Continue ASA, Pavix and statin for secondary stroke prophylaxis.     Severe Acute Metabolic Encephalopathy   Verbally and physically agitated towards staff without provocation  -Likely due to UTI with polypharmacy and hearing loss contributing  --There is some concern for underlying mild cognitive impermanent.  -Behavior emerged 2/14.  -Stopped Meclizine, Flexeril, and decreased Paxil on 2/15  -Began low dose Zyprexa on 2/16 however patient refused all meds. IM Geodon given. Antipsychotics helpful.  -Continue to empirically treatment UTI with Omnicef.  -2/20 IM 10mg Geodon at 6am as patient would not participate. After pleasant, cooperative. Will trial out of posey bed. Patient claustrophobic.  -2/21- Out of posey  bed. Plesent  -2/22- Wean Zyprexa to 2.5mg BID to help with restlessness  -Plan to wean off zyprexa within one week of discharge  -Recommend staying off meclizine, flexeril and paxil as they increase liklihood of delerium  -Continue Thiamine for brain health     Tachycardia, restlessness, anxiety  Started 2/22- likely due to COPD medications  -Stop all COPD medications  -Stop Spiriva, tiotropium and Dulera  -patient was not on these medicaitons at home per patient and daughter  -EKG 2/22 shows sinus tachycardia  -Restart Metoprolol 50mg BID        Elevated lactic acid- reolved  - Lactic acid 2.8  - likely due to UTI     UTI  - UA + for UTI on 2/16  -multiple missed doses of bactrim due to refusal, combativeness  -Restart Abx. On 2/19. start Rocephin 1g daily IM for 5 days. Cultures and sensitivities back  -2/20 continue Rocephin  -2/21- transition to oral abx Omnicef 300mg BID. Through 2/24/24     Globes sensation with swallowing  -consider GI referall     Factor V leiden  -has not been on anticoagulation for 10 years  -bilateral  US to rule out blood clots negative on 2/11  -Continue ASA, Plavix. Follow up with Heme  -referral sent to hematology     Vertigo  -PT to test BPPV  -holding meclizine     CAD  -Plavix     COPD  -on 3l at home     HTN  Home meds Norvasc 10mg daily, Losartan 100mg daily, Lopressor 100mg BID  -2/14- decrease Losartan to 50mg daily due to hypotension and poor po intake  -2/15- refusing BP medication  -2/16- refusing all vitals and bp meds  -2/18 intermittmently elevated   -2/20- Hold all BP meds  -2/21- continue to hold bp meds  2/22- started 50mg BID of lopressor due to tachycardia.  -Consider restarting low dose of Losartan if needed  -Monitor BP 2 times daily     Lumbar back pain  Flexeril and gabapentin  -2/14- DC flexeril and gabapentin due to delerium  -2/15- restarted flexeril 10mg TID PRN as patient triggered by not getting her muscle relaxor  -Holding all pain medications  -continue  to hold all pain medications due to high risk of delerium  -Recommend not restarting flexeril or gabapentin. If back pain becomes a problem may trial low dose Flexeril; however, risk of delerium increases.      Insomnia 2/2 Delirium  -Started Trazodone 100mg QHS on 2/16.  -Continue PRN at discharge. May trial off at home.     Mood  Anxiety  -Paxil  -2/14-Wean off Paxil due to delerium. 20mg daily. Patient refusing all medications  -2/21- start Lexipro 10mg daily. DC Paxil due to increased risk of delerium  -PCP may want to increase lexipro in the outpatient setting.     Pain:  - Neuroceptic - continue tylenol     GI prophylaxis:  2/21- start pepcid for heartburn    Functional Status at Discharge  Eating:  Supervision (Setup)  Eating Description:  Verbal cueing  Grooming:  Standby Assist, Standing  Grooming Description:  Increased time, Initial preparation for task, Seated in wheelchair at sink, Set-up of equipment, Supervision for safety, Verbal cueing  Bathing:  Moderate Assist  Bathing Description:  Grab bar, Hand held shower, Tub bench, Assit with back, Assit wtih lower extremities, Increased time, Initial preparation for task, Set-up of equipment, Supervision for safety, Verbal cueing  Upper Body Dressing:  Minimal Assist  Upper Body Dressing Description:  Increased time, Initial preparation for task, Set-up of equipment, Supervision for safety, Assist with closures, Verbal cueing  Lower Body Dressing:  Contact Guard Assist (to don elastic waist pants while incorporating sitting and standing w/ FWW)  Lower Body Dressing Description:  Grab bar, Increased time, Initial preparation for task, Set-up of equipment, Supervision for safety, Verbal cueing, Assist with threading into pant leg     Walk:  Standby Assist  Distance Walked:  300  Number of Times Distance Was Traveled:  2  Assistive Device:  Front Wheel Walker  Gait Deviation:  Step To, Bradykinetic, Decreased Heel Strike, Decreased Toe Off  Wheelchair:   Minimal Assist (Jody for steering to the L)  Distance Propelled:  60   Wheelchair Description:  Limited by fatigue, Supervision for safety, Verbal cueing, Leg rest management, Extra time, Assistance with steering  Stairs Contact Guard Assist  Stairs Description Extra time, Hand rails, Oxygen, Supervision for safety, Verbal cueing  Discharge Location: Assisted Living  Patient Discharging with Assist of: Family;Paid Caregiver  Level of Supervision Required Upon Discharge: Intermittent Supervision  Recommended Equipment for Discharge: Front-Wheeled Walker  Recommeded Services Upon Discharge: Home Health Physical Therapy  Long Term Goals Met: 3  Long Term Goals Not Met: 1  Reason(s) for Goals Not Met: goals set for SPV for stairs, pt still requires CGA  Criteria for Termination of Services: Maximum Function Achieved for Inpatient Rehabilitation  Comprehension:  Moderate Assist  Comprehension Description:  Hearing aids/amplifiers, Glasses  Expression:  Independent  Expression Description:  Verbal cueing  Social Interaction:  Maximal Assist  Social Interaction Description:  Enclosure bed, Increased time, Low stimulation environment, Medication, Verbal cues  Problem Solving:  Maximal Assist  Problem Solving Description:  Verbal cueing, Therapy schedule, Bed/chair alarm, Enclosure bed, Increased time  Memory:  Maximal Assist  Memory Description:  Increased time, Enclosure bed, Bed/chair alarm, Verbal cueing, Therapy schedule  Progress since Admit: Pt has made fair progress since admission. Rehab stay limited by patient's ability to participate in structured therapy tasks when being treated for UTI and subsequent delirium for several days. pt has cleared and is able to particpate in structured therapy tasks at this time. Will require assistance with IADLs at [facility] at d/c, ongoing ST services recommended via home health.  Discharge Location : Assisted Living  Patient Discharging with Assist of: Family   Level of Supervision  Required: Intermittent Supervision  Recommended Services Upon Discharge: Home Health Speech Therapy  Long Term Goals Met: 1/2  Long Term Goals Not Met: 1/2  Reason(s) for Goals Not Met: Pt is MIN to MOD A for memory and problem solving at this time.  Criteria for Termination of Services: Maximum Function Achieved for Inpatient Rehabilitation    Carlin WAGNER M.D., personally performed a complete drug regimen review and no potential clinically significant medication issues were identified.   Discharge Medication:     Medication List        START taking these medications        Instructions   acetaminophen 325 MG Tabs  Commonly known as: Tylenol   Take 2 Tablets by mouth every 6 hours as needed for Mild Pain.  Dose: 650 mg     aspirin 81 MG Chew chewable tablet  Start taking on: February 24, 2024  Commonly known as: Asa   Chew 1 Tablet every day.  Dose: 81 mg     atorvastatin 40 MG Tabs  Commonly known as: Lipitor   Take 1 Tablet by mouth every evening.  Dose: 40 mg     cefdinir 300 MG Caps  Commonly known as: Omnicef   Take 1 Capsule by mouth every 12 hours.  Dose: 300 mg     clopidogrel 75 MG Tabs  Start taking on: February 24, 2024  Commonly known as: Plavix   Take 1 Tablet by mouth every day.  Dose: 75 mg     escitalopram 10 MG Tabs  Start taking on: February 24, 2024  Commonly known as: Lexapro   Take 1 Tablet by mouth every day.  Dose: 10 mg     famotidine 20 MG Tabs  Commonly known as: Pepcid   Take 1 Tablet by mouth 2 times a day.  Dose: 20 mg     metoprolol tartrate 50 MG Tabs  Commonly known as: Lopressor   Take 1 Tablet by mouth 2 times a day.  Dose: 50 mg     OLANZapine 2.5 MG Tabs  Start taking on: February 23, 2024  Commonly known as: ZyPREXA   Take 1 Tablet by mouth 2 times a day for 3 days, THEN 1 Tablet every day for 3 days.     thiamine 100 MG tablet  Start taking on: February 24, 2024  Commonly known as: Thiamine   Take 1 Tablet by mouth every day.  Dose: 100 mg     traZODone 50 MG  Tabs  Commonly known as: Desyrel   Take 1 Tablet by mouth at bedtime as needed for Sleep.  Dose: 50 mg              Discharge Diet:  Current Diet Order   Procedures    Diet Order Diet: Regular       Discharge Activity:  AS tolerated     Disposition:  Patient to discharge home with family support and community resources.    Equipment:  Front wheeled walker    Follow-up & Discharge Instructions:  Follow up with your primary care provider (PCP) within 7-10 days of discharge to review your medications and take over your care.     If you develop chest pain, fever, chills, change in neurologic function (weakness, sensation changes, vision changes), or other concerning sxs, seek immediate medical attention or call 911.      Future Appointments   Date Time Provider Department Center   2/23/2024 10:30 AM Tonie Taylor MS,OTR/L [facility] None       Condition on Discharge:  Good    More than 35 minutes was spent on discharging this patient, including face-to-face time, prescription management, and the dictation of this note.    ____________________________________     Carlin Powers MD  Physical Medicine & Rehabilitation   Brain Injury Medicine   ____________________________________    Date of Service: 2/23/2024

## 2024-02-23 NOTE — FLOWSHEET NOTE
02/22/24 1903   Events/Summary/Plan   Events/Summary/Plan SpO2 check   Vital Signs   Pulse 77   Respiration 18   Pulse Oximetry 96 %   $ Pulse Oximetry (Spot Check) Yes   Oxygen   O2 (LPM) 2   O2 Delivery Device Nasal Cannula

## 2024-02-23 NOTE — THERAPY
"Physical Therapy   Daily Treatment     Patient Name: Lorraine Leung  Age:  78 y.o., Sex:  female  Medical Record #: [id number]  Today's Date: 2/22/2024     Precautions  Precautions: Fall Risk  Comments: [place], pt wears headphones amplifier, O2=2L no wean    Subjective    \"I'm getting out of here tomorrow right?\"     Objective       02/22/24 1501   PT Charge Group   PT Gait Training (Units) 2   PT Therapeutic Activities (Units) 2   PT Total Time Spent   PT Individual Total Time Spent (Mins) 60   Gait Functional Level of Assist    Gait Level Of Assist Standby Assist   Assistive Device Front Wheel Walker   Distance (Feet) 300   # of Times Distance was Traveled 2   Deviation Step To;Bradykinetic;Decreased Heel Strike;Decreased Toe Off   Stairs Functional Level of Assist   Level of Assist with Stairs Contact Guard Assist   # of Stairs Climbed 4   Stairs Description Extra time;Hand rails;Oxygen;Supervision for safety;Verbal cueing   Transfer Functional Level of Assist   Bed, Chair, Wheelchair Transfer Supervised   Bed Chair Wheelchair Transfer Description Squat pivot transfer to wheelchair;Supervision for safety   Bed Mobility    Supine to Sit Independent   Sit to Supine Independent   Sit to Stand Supervised   Rolling Independent   Interdisciplinary Plan of Care Collaboration   IDT Collaboration with  Physician   Patient Position at End of Therapy Seated;Edge of Bed;Bed Alarm On;Call Light within Reach;Tray Table within Reach;Phone within Reach   Collaboration Comments DC disposition   Roll Left and Right   Assistance Needed Independent   CARE Score - Roll Left and Right 6   Sit to Lying   Assistance Needed Independent   CARE Score - Sit to Lying 6   Lying to Sitting on Side of Bed   Assistance Needed Independent   CARE Score - Lying to Sitting on Side of Bed 6   Sit to Stand   Assistance Needed Supervision   CARE Score - Sit to Stand 4   Chair/Bed-to-Chair Transfer   Assistance Needed Supervision   CARE Score - " Chair/Bed-to-Chair Transfer 4   Toilet Transfer   Assistance Needed Supervision   CARE Score - Toilet Transfer 4   Car Transfer   Assistance Needed Supervision   CARE Score - Car Transfer 4   Walk 10 Feet   Assistance Needed Supervision   CARE Score - Walk 10 Feet 4   Walk 50 Feet with Two Turns   Assistance Needed Supervision   CARE Score - Walk 50 Feet with Two Turns 4   Walk 150 Feet   Assistance Needed Supervision   CARE Score - Walk 150 Feet 4   Walking 10 Feet on Uneven Surfaces   Assistance Needed Supervision   CARE Score - Walking 10 Feet on Uneven Surfaces 4   1 Step (Curb)   Assistance Needed Incidental touching   CARE Score - 1 Step (Curb) 4   4 Steps   Assistance Needed Incidental touching   CARE Score - 4 Steps 4   12 Steps   Reason if not Attempted Activity not applicable   CARE Score - 12 Steps 9   Picking Up Object   Assistance Needed Incidental touching   CARE Score - Picking Up Object 4   Wheel 50 Feet with Two Turns   Reason if not Attempted Activity not applicable   CARE Score - Wheel 50 Feet with Two Turns 9   Wheel 150 Feet   Reason if not Attempted Activity not applicable   CARE Score - Wheel 150 Feet 9   P.T. Discharge Summary   Discharge Location Assisted Living   Patient Discharging with Assist of Family;Paid Caregiver   Level of Supervision Required Upon Discharge Intermittent Supervision   Recommended Equipment for Discharge Front-Wheeled Walker   Recommeded Services Upon Discharge Home Health Physical Therapy   Long Term Goals Met 3   Long Term Goals Not Met 1   Reason(s) for Goals Not Met goals set for SPV for stairs, pt still requires CGA   Criteria for Termination of Services Maximum Function Achieved for Inpatient Rehabilitation       Completed mobility discharge IRF-Kim, completed patient passport, discussed home safety and floor recovery/fall prevention. Reviewed HEP with handout provided. Reviewed relevant precautions to maximize safety during home and community mobility.      Patient reports understanding and agreement c/ discharge plan.       Assessment:         Lorraine Leung has participated well in their inpatient rehabilitation program with functional gains as above in flowsheet. They are now appropriate for discharge to  assisted living with family support initially followed by paid caregiver support.    Tentative discharge on 02/23/24.     Patient passport completed.         Physical Therapy Problems (Active)       There are no active problems.

## 2024-02-24 RX ORDER — CEFDINIR 300 MG/1
300 CAPSULE ORAL EVERY 12 HOURS
Qty: 4 CAPSULE | Refills: 0 | Status: ACTIVE | OUTPATIENT
Start: 2024-02-24

## 2024-02-24 NOTE — THERAPY
"Occupational Therapy   Discharge Summary     Patient Name: Lorraine Leung  Age:  78 y.o., Sex:  female  Medical Record #: [id number]  Today's Date: 2/23/2024     Precautions  Precautions: Fall Risk  Comments: [place], pt wears headphones amplifier, O2=2L no wean    Safety   ADL Safety : Requires Supervision for Safety, Requires Physical Assist for Safety, Requires Cueing for Safety  Bathroom Safety: Requires Supervision for Safety, Requires Physical Assist for Safety, Requires Cuing for Safety    Subjective    Pt seated in w/c upon arrival, agreeable to participate in OT although reassurance/encouragement required re: shower \"is not a test.\" Daughter Torri present for family training.      Objective     02/23/24 1031   OT Charge Group   OT Self Care / ADL (Units) 4   OT Total Time Spent   OT Individual Total Time Spent (Mins) 60   Precautions   Precautions Fall Risk   Comments [place], pt wears headphones amplifier, O2=2L no wean   Eating   Assistance Needed Independent   CARE Score - Eating 6   Oral Hygiene   Assistance Needed Independent   CARE Score - Oral Hygiene 6   Toileting Hygiene   Assistance Needed Supervision   CARE Score - Toileting Hygiene 4   Shower/Bathe Self   Assistance Needed Incidental touching   CARE Score - Shower/Bathe Self 4   Upper Body Dressing   Assistance Needed Set-up / clean-up   CARE Score - Upper Body Dressing 5   Lower Body Dressing   Assistance Needed Physical assistance   Physical Assistance Level 25% or less   CARE Score - Lower Body Dressing 3   Putting On/Taking Off Footwear   Assistance Needed Physical assistance   Physical Assistance Level 25% or less   CARE Score - Putting On/Taking Off Footwear 3   Toilet Transfer   Assistance Needed Supervision   CARE Score - Toilet Transfer 4   Cognitive Pattern Assessment   Cognitive Pattern Assessment Used BIMS   Brief Interview for Mental Status (BIMS)   Repetition of Three Words (First Attempt) 3   Temporal Orientation: Year Correct   Temporal " "Orientation: Month Accurate within 5 days   Temporal Orientation: Day Correct   Recall: \"Sock\" Yes, no cue required   Recall: \"Blue\" Yes, no cue required   Recall: \"Bed\" Yes, no cue required   BIMS Summary Score 15   Confusion Assessment Method (CAM)   Is there evidence of an acute change in mental status from the patient's baseline? Yes   Inattention Behavior present, fluctuates (comes and goes, changes in severity)   Disorganized thinking Behavior present, fluctuates (comes and goes, changes in severity)   Altered level of consciousness Behavior not present   Discharge Summary    Discharge Location  Assisted Living   Patient Discharging with Assist of Family   (daughter)   Level of Supervision Required 24 Hour Supervision  (24 hour supervision initially (daughter to provide))   Recommended Equipment for Discharge Manual Wheelchair;Front-Wheeled Walker;Grab Bars by Toilet;Grab Bars in Tub / Shower;Hand Held Shower Head;Shower Chair   Recommended Services Upon Discharge Home Health Occupational Therapy   Long Term Goals Met 1   Long Term Goals Not Met 1   Reason(s) for Goals Not Met safety, attention, cogntition   Criteria for Termination of Services Maximum Function Achieved for Inpatient Rehabilitation     Family training/education completed w/ daughter w/ emphasis on ADLs and overall functional independence. Reviewed safety considerations/adaptive techniques including jones dressing techniques, fall prevention strategies, addressing impulsivity.     Assessment    Family training completed w/ daughter. Torri receptive to all DC recommendations including fall alert system, initial 24/7 supervision > transition to [facility] staff providing supervision for showers and ADL tasks daily.   Strengths: Making steady progress towards goals, Motivated for self care and independence, Pleasant and cooperative, Supportive family, Willingly participates in therapeutic activities  Barriers: Fatigue, Hemiparesis, Generalized weakness, " Hearing impairment, Impaired activity tolerance, Impaired balance, Impaired insight/denial of deficits, Limited mobility    Plan    DC to detention today w/daughter to provide 24/7 supervision initially.     Occupational Therapy Goals (Active)       Problem: Dressing       Dates: Start:  02/10/24         Goal: STG-Within one week, patient will dress UB at Mod Indep       Dates: Start:  02/10/24            Goal: STG-Within one week, patient will dress LB at Sup via AE/DME PRN       Dates: Start:  02/10/24               Problem: Functional Transfers       Dates: Start:  02/10/24         Goal: STG-Within one week, patient will transfer to toilet at Sup via DME       Dates: Start:  02/10/24               Problem: IADL's       Dates: Start:  02/10/24         Goal: STG-Within one week, patient will prepare a simple meal at Min assist via AE/DME PRN.       Dates: Start:  02/10/24               Problem: Toileting       Dates: Start:  02/10/24         Goal: STG-Within one week, patient will complete toileting tasks at Sup via AE/DME PRN - including management of disposable briefs and hygiene.       Dates: Start:  02/10/24

## 2024-02-27 ENCOUNTER — TELEPHONE (OUTPATIENT)
Dept: CARDIOLOGY | Facility: MEDICAL CENTER | Age: 79
End: 2024-02-27
Payer: MEDICARE

## 2024-02-28 PROCEDURE — 93228 REMOTE 30 DAY ECG REV/REPORT: CPT | Performed by: INTERNAL MEDICINE

## 2025-01-30 NOTE — CARE PLAN
Problem: Dressing  Goal: STG-Within one week, patient will dress UB at Mod Indep  Outcome: Not Met  Goal: STG-Within one week, patient will dress LB at Sup via AE/DME PRN  Outcome: Not Met     Problem: Toileting  Goal: STG-Within one week, patient will complete toileting tasks at Sup via AE/DME PRN - including management of disposable briefs and hygiene.  Outcome: Not Met     Problem: Functional Transfers  Goal: STG-Within one week, patient will transfer to toilet at Sup via DME  Outcome: Not Met     Problem: IADL's  Goal: STG-Within one week, patient will prepare a simple meal at Min assist via AE/DME PRN.  Outcome: Not Met      weight-bearing as tolerated

## 2025-02-05 NOTE — PROGRESS NOTES
Bedside report given to Khushbu ESPINOSA RN regarding patient and to assume care.   [Nutrition/ Exercise/ Weight Management] : nutrition, exercise, weight management [Vitamins/Supplements] : vitamins/supplements [Breast Self Exam] : breast self exam [Contraception/ Emergency Contraception/ Safe Sexual Practices] : contraception, emergency contraception, safe sexual practices [STD (testing, results, tx)] : STD (testing, results, tx)